# Patient Record
Sex: MALE | Race: WHITE | NOT HISPANIC OR LATINO | Employment: FULL TIME | URBAN - METROPOLITAN AREA
[De-identification: names, ages, dates, MRNs, and addresses within clinical notes are randomized per-mention and may not be internally consistent; named-entity substitution may affect disease eponyms.]

---

## 2017-07-06 DIAGNOSIS — M75.41 IMPINGEMENT SYNDROME OF RIGHT SHOULDER: Primary | ICD-10-CM

## 2017-07-20 ENCOUNTER — HOSPITAL ENCOUNTER (OUTPATIENT)
Dept: RADIOLOGY | Facility: HOSPITAL | Age: 37
Discharge: HOME | End: 2017-07-20
Attending: ORTHOPAEDIC SURGERY
Payer: COMMERCIAL

## 2017-07-20 DIAGNOSIS — M75.41 IMPINGEMENT SYNDROME OF RIGHT SHOULDER: ICD-10-CM

## 2017-07-20 PROCEDURE — 73221 MRI JOINT UPR EXTREM W/O DYE: CPT | Mod: 26,RT,, | Performed by: RADIOLOGY

## 2017-07-20 PROCEDURE — 73221 MRI JOINT UPR EXTREM W/O DYE: CPT | Mod: TC,RT

## 2017-08-31 ENCOUNTER — OFFICE VISIT (OUTPATIENT)
Dept: OPTOMETRY | Facility: CLINIC | Age: 37
End: 2017-08-31
Payer: COMMERCIAL

## 2017-08-31 DIAGNOSIS — Z46.0 ENCOUNTER FOR FITTING OR ADJUSTMENT OF SPECTACLES OR CONTACT LENSES: Primary | ICD-10-CM

## 2017-08-31 DIAGNOSIS — Z01.00 EXAMINATION OF EYES AND VISION: Primary | ICD-10-CM

## 2017-08-31 DIAGNOSIS — H52.13 MYOPIA, BILATERAL: ICD-10-CM

## 2017-08-31 PROCEDURE — 99999 PR PBB SHADOW E&M-EST. PATIENT-LVL I: CPT | Mod: PBBFAC,,, | Performed by: OPTOMETRIST

## 2017-08-31 PROCEDURE — 92015 DETERMINE REFRACTIVE STATE: CPT | Mod: S$GLB,,, | Performed by: OPTOMETRIST

## 2017-08-31 PROCEDURE — 92004 COMPRE OPH EXAM NEW PT 1/>: CPT | Mod: S$GLB,,, | Performed by: OPTOMETRIST

## 2017-08-31 PROCEDURE — 99499 UNLISTED E&M SERVICE: CPT | Mod: S$GLB,,, | Performed by: OPTOMETRIST

## 2017-08-31 PROCEDURE — 92310 CONTACT LENS FITTING OU: CPT | Mod: ,,, | Performed by: OPTOMETRIST

## 2017-08-31 NOTE — PROGRESS NOTES
Subjective:       Patient ID: Ed Osei is a 36 y.o. male      Chief Complaint   Patient presents with    Concerns About Ocular Health    Contact Lens Follow Up     History of Present Illness  Dls: 5/20/13 Dr. Talbot 2 yrs [facility]     Pt states no changes in vision. Pt wears scls and single vision glasses.   Pt wants a new rx for cls only. Pt states no tearing no itching no burning   no pain no ha's no floaters. Pt does not want to be dilated today.     Eye meds:   None    Acuvue Oasys. Replaces monthly. Doesn't sleep in lenses.      Assessment/Plan:     1. Examination of eyes and vision  Pt declined dilation, states he can't drive after he is dilated. Discussed with pt that dilation affects near vision but pt still declined dilation today. Educated pt about importance of dilation, especially since he has never had one with our clinic. Advised pt that at his next annual eye exam we will need to dilate to check ocular health and if needed, pt should bring someone with him to provide transportation. Pt verbalized understanding.     2. Myopia, bilateral  Educated patient on refractive error and discussed lens options. Dispensed updated spectacle Rx. Educated about adaptation period to new specs.    Eyeglass Final Rx     Eyeglass Final Rx       Sphere Cylinder    Right -5.50 Sphere    Left -5.50 Sphere    Type:  SVL    Expiration Date:  9/1/2018              Pt wearing oasys and replacing monthly. Discussed with pt that oasys is a 2 week replacement lens. Will trial Ingris, pt reports good comfort and vision in office. Pt to try lenses for 1 week, if happy can proceed with ordering. If pt unhappy with lenses, can switch back to oasys if needed.     Contact lens Rx released to pt. Daily wear only advised, replacement monthly with education to risks of extended wear.  Discussed lens care, compliance and solutions. RTC yearly contact lens follow up.     Contact Lens Final Rx     Final Contact Lens  Rx       Brand Base Curve Diameter Sphere Cylinder    Right Acuvue Ingris 8.4 14.0 -5.25 Sphere    Left Acuvue Ingris 8.4 14.0 -5.25 Sphere    Expiration Date:  9/1/2018    Replacement:  Monthly    Wearing Schedule:  Daily wear                Return in about 1 year (around 8/31/2018) for Annual eye exam, Contact Lens Follow Up.

## 2017-10-30 ENCOUNTER — HOSPITAL ENCOUNTER (OUTPATIENT)
Dept: PREADMISSION TESTING | Facility: HOSPITAL | Age: 37
Discharge: HOME | End: 2017-10-30
Attending: ORTHOPAEDIC SURGERY
Payer: COMMERCIAL

## 2017-10-30 VITALS
WEIGHT: 158.44 LBS | HEIGHT: 66 IN | RESPIRATION RATE: 16 BRPM | SYSTOLIC BLOOD PRESSURE: 115 MMHG | OXYGEN SATURATION: 99 % | BODY MASS INDEX: 25.46 KG/M2 | DIASTOLIC BLOOD PRESSURE: 78 MMHG | HEART RATE: 70 BPM | TEMPERATURE: 97 F

## 2017-10-30 DIAGNOSIS — Z01.818 PREOP TESTING: Primary | ICD-10-CM

## 2017-10-30 LAB
BASOPHILS # BLD AUTO: 0.02 K/UL
BASOPHILS NFR BLD: 0.3 %
DIFFERENTIAL METHOD: ABNORMAL
EOSINOPHIL # BLD AUTO: 0.3 K/UL
EOSINOPHIL NFR BLD: 3.3 %
ERYTHROCYTE [DISTWIDTH] IN BLOOD BY AUTOMATED COUNT: 12.8 %
HCT VFR BLD AUTO: 43.4 %
HGB BLD-MCNC: 15.3 G/DL
LYMPHOCYTES # BLD AUTO: 2.5 K/UL
LYMPHOCYTES NFR BLD: 32.3 %
MCH RBC QN AUTO: 31.5 PG
MCHC RBC AUTO-ENTMCNC: 35.3 G/DL
MCV RBC AUTO: 89 FL
MONOCYTES # BLD AUTO: 0.7 K/UL
MONOCYTES NFR BLD: 9 %
NEUTROPHILS # BLD AUTO: 4.3 K/UL
NEUTROPHILS NFR BLD: 55.1 %
PLATELET # BLD AUTO: 201 K/UL
PMV BLD AUTO: 10.1 FL
RBC # BLD AUTO: 4.86 M/UL
WBC # BLD AUTO: 7.77 K/UL

## 2017-10-30 PROCEDURE — 85025 COMPLETE CBC W/AUTO DIFF WBC: CPT

## 2017-10-30 NOTE — DISCHARGE INSTRUCTIONS
Your surgery is scheduled for___MONDAY 11/13______________.    Call [phone] between 2 pm and 5 pm ___FRIDAY11/10_________ to find out your arrival time for the day of surgery.    Report to SAME DAY SURGERY UNIT at _______am on the 2nd floor of the hospital.  Use the front entrance of the hospital before 530AM      Important instructions:   Do not eat or drink after 12 midnight, including water.  It is okay to brush your teeth.  Do not have gum, candy or mints.                    Prep instructions:     SHOWER   ___________     Please shower the night before and the morning of your surgery.       Use Hibiclens soap to your surgery site if instructed by your pre op nurse.     Be sure to rinse off Hibiclens after it is on your skin for several minutes.  Do not use Hibiclens on your face or genitals.           You may wear deodorant only.      Do not wear powder, body lotion or cologne.     Do not wear any jewelry or have any metal on your body.          If you are going home on the same day of surgery, you must have arrangements for a ride home.  You will not be able to drive home if you were given anesthesia or sedation.               Stop taking Aspirin, Ibuprofen, Motrin and Aleve at least 7 days before your surgery. You may use Tylenol.     Stop taking fish oil and vitamin E for least 7 days before surgery.     Wear loose fitting clothes allowing for bandages.     Please leave money and valuables home.       You may bring your cell phone.     Call the doctor if fever or illness should occur before your surgery.    Call [phone] to contact us here at [facility] if needed.

## 2017-10-30 NOTE — PLAN OF CARE
Pre-operative instructions, medication directives and pain scales reviewed with Mr Osei   All questions the patient had  were answered. Re-assurance about surgical procedure and day of surgery routine given as needed. Mr Osei verbalized understanding of the pre-op instructions.

## 2017-11-12 ENCOUNTER — ANESTHESIA EVENT (OUTPATIENT)
Dept: SURGERY | Facility: HOSPITAL | Age: 37
End: 2017-11-12
Payer: COMMERCIAL

## 2017-11-13 ENCOUNTER — HOSPITAL ENCOUNTER (OUTPATIENT)
Facility: HOSPITAL | Age: 37
Discharge: HOME | End: 2017-11-13
Attending: ORTHOPAEDIC SURGERY | Admitting: ORTHOPAEDIC SURGERY
Payer: COMMERCIAL

## 2017-11-13 ENCOUNTER — ANESTHESIA (OUTPATIENT)
Dept: SURGERY | Facility: HOSPITAL | Age: 37
End: 2017-11-13
Payer: COMMERCIAL

## 2017-11-13 ENCOUNTER — SURGERY (OUTPATIENT)
Age: 37
End: 2017-11-13

## 2017-11-13 VITALS
HEIGHT: 66 IN | BODY MASS INDEX: 25.39 KG/M2 | HEART RATE: 69 BPM | TEMPERATURE: 98 F | SYSTOLIC BLOOD PRESSURE: 118 MMHG | OXYGEN SATURATION: 100 % | DIASTOLIC BLOOD PRESSURE: 70 MMHG | WEIGHT: 158 LBS | RESPIRATION RATE: 17 BRPM

## 2017-11-13 DIAGNOSIS — M24.111 LABRAL TEAR OF SHOULDER, DEGENERATIVE, RIGHT: Primary | ICD-10-CM

## 2017-11-13 PROCEDURE — 25000003 PHARM REV CODE 250: Performed by: ORTHOPAEDIC SURGERY

## 2017-11-13 PROCEDURE — D9220A PRA ANESTHESIA: Mod: CRNA,,, | Performed by: NURSE ANESTHETIST, CERTIFIED REGISTERED

## 2017-11-13 PROCEDURE — 25000003 PHARM REV CODE 250: Performed by: ANESTHESIOLOGY

## 2017-11-13 PROCEDURE — 76942 ECHO GUIDE FOR BIOPSY: CPT | Mod: 26,,, | Performed by: ANESTHESIOLOGY

## 2017-11-13 PROCEDURE — 36000710: Performed by: ORTHOPAEDIC SURGERY

## 2017-11-13 PROCEDURE — 63600175 PHARM REV CODE 636 W HCPCS: Performed by: ORTHOPAEDIC SURGERY

## 2017-11-13 PROCEDURE — 63600175 PHARM REV CODE 636 W HCPCS: Performed by: NURSE ANESTHETIST, CERTIFIED REGISTERED

## 2017-11-13 PROCEDURE — 63600175 PHARM REV CODE 636 W HCPCS: Performed by: ANESTHESIOLOGY

## 2017-11-13 PROCEDURE — C1713 ANCHOR/SCREW BN/BN,TIS/BN: HCPCS | Performed by: ORTHOPAEDIC SURGERY

## 2017-11-13 PROCEDURE — 71000033 HC RECOVERY, INTIAL HOUR: Performed by: ORTHOPAEDIC SURGERY

## 2017-11-13 PROCEDURE — 37000009 HC ANESTHESIA EA ADD 15 MINS: Performed by: ORTHOPAEDIC SURGERY

## 2017-11-13 PROCEDURE — 25000003 PHARM REV CODE 250: Performed by: NURSE ANESTHETIST, CERTIFIED REGISTERED

## 2017-11-13 PROCEDURE — 76942 ECHO GUIDE FOR BIOPSY: CPT | Performed by: ANESTHESIOLOGY

## 2017-11-13 PROCEDURE — D9220A PRA ANESTHESIA: Mod: ANES,,, | Performed by: ANESTHESIOLOGY

## 2017-11-13 PROCEDURE — 36000711: Performed by: ORTHOPAEDIC SURGERY

## 2017-11-13 PROCEDURE — 64415 NJX AA&/STRD BRCH PLXS IMG: CPT | Mod: 59,RT,, | Performed by: ANESTHESIOLOGY

## 2017-11-13 PROCEDURE — 71000015 HC POSTOP RECOV 1ST HR: Performed by: ORTHOPAEDIC SURGERY

## 2017-11-13 PROCEDURE — 27201423 OPTIME MED/SURG SUP & DEVICES STERILE SUPPLY: Performed by: ORTHOPAEDIC SURGERY

## 2017-11-13 PROCEDURE — 37000008 HC ANESTHESIA 1ST 15 MINUTES: Performed by: ORTHOPAEDIC SURGERY

## 2017-11-13 PROCEDURE — 27200750 HC INSULATED NEEDLE/ STIMUPLEX: Performed by: ANESTHESIOLOGY

## 2017-11-13 RX ORDER — MIDAZOLAM HYDROCHLORIDE 1 MG/ML
INJECTION, SOLUTION INTRAMUSCULAR; INTRAVENOUS
Status: DISCONTINUED | OUTPATIENT
Start: 2017-11-13 | End: 2017-11-13

## 2017-11-13 RX ORDER — OXYCODONE AND ACETAMINOPHEN 5; 325 MG/1; MG/1
1 TABLET ORAL
Status: DISCONTINUED | OUTPATIENT
Start: 2017-11-13 | End: 2017-11-13 | Stop reason: HOSPADM

## 2017-11-13 RX ORDER — SODIUM CHLORIDE 0.9 % (FLUSH) 0.9 %
3 SYRINGE (ML) INJECTION
Status: DISCONTINUED | OUTPATIENT
Start: 2017-11-13 | End: 2017-11-13 | Stop reason: HOSPADM

## 2017-11-13 RX ORDER — HYDROCODONE BITARTRATE AND ACETAMINOPHEN 7.5; 325 MG/1; MG/1
1 TABLET ORAL EVERY 4 HOURS PRN
Qty: 50 TABLET | Refills: 0 | Status: SHIPPED | OUTPATIENT
Start: 2017-11-13 | End: 2017-11-23

## 2017-11-13 RX ORDER — CEFAZOLIN SODIUM 2 G/50ML
2 SOLUTION INTRAVENOUS
Status: DISCONTINUED | OUTPATIENT
Start: 2017-11-13 | End: 2017-11-13 | Stop reason: HOSPADM

## 2017-11-13 RX ORDER — METOCLOPRAMIDE HYDROCHLORIDE 5 MG/ML
10 INJECTION INTRAMUSCULAR; INTRAVENOUS EVERY 10 MIN PRN
Status: DISCONTINUED | OUTPATIENT
Start: 2017-11-13 | End: 2017-11-13 | Stop reason: HOSPADM

## 2017-11-13 RX ORDER — SODIUM CHLORIDE, SODIUM LACTATE, POTASSIUM CHLORIDE, CALCIUM CHLORIDE 600; 310; 30; 20 MG/100ML; MG/100ML; MG/100ML; MG/100ML
INJECTION, SOLUTION INTRAVENOUS CONTINUOUS
Status: DISCONTINUED | OUTPATIENT
Start: 2017-11-13 | End: 2017-11-13 | Stop reason: HOSPADM

## 2017-11-13 RX ORDER — LIDOCAINE HCL/EPINEPHRINE/PF 2%-1:200K
VIAL (ML) INJECTION
Status: DISCONTINUED | OUTPATIENT
Start: 2017-11-13 | End: 2017-11-13 | Stop reason: HOSPADM

## 2017-11-13 RX ORDER — LIDOCAINE HCL/PF 100 MG/5ML
SYRINGE (ML) INTRAVENOUS
Status: DISCONTINUED | OUTPATIENT
Start: 2017-11-13 | End: 2017-11-13

## 2017-11-13 RX ORDER — PROPOFOL 10 MG/ML
VIAL (ML) INTRAVENOUS CONTINUOUS PRN
Status: DISCONTINUED | OUTPATIENT
Start: 2017-11-13 | End: 2017-11-13

## 2017-11-13 RX ORDER — MEPERIDINE HYDROCHLORIDE 50 MG/ML
12.5 INJECTION INTRAMUSCULAR; INTRAVENOUS; SUBCUTANEOUS ONCE AS NEEDED
Status: COMPLETED | OUTPATIENT
Start: 2017-11-13 | End: 2017-11-13

## 2017-11-13 RX ORDER — LIDOCAINE HYDROCHLORIDE 10 MG/ML
1 INJECTION, SOLUTION EPIDURAL; INFILTRATION; INTRACAUDAL; PERINEURAL ONCE
Status: DISCONTINUED | OUTPATIENT
Start: 2017-11-13 | End: 2017-11-13 | Stop reason: HOSPADM

## 2017-11-13 RX ORDER — GLYCOPYRROLATE 0.2 MG/ML
INJECTION INTRAMUSCULAR; INTRAVENOUS
Status: DISCONTINUED | OUTPATIENT
Start: 2017-11-13 | End: 2017-11-13

## 2017-11-13 RX ORDER — FENTANYL CITRATE 50 UG/ML
INJECTION, SOLUTION INTRAMUSCULAR; INTRAVENOUS
Status: DISCONTINUED | OUTPATIENT
Start: 2017-11-13 | End: 2017-11-13

## 2017-11-13 RX ORDER — ONDANSETRON 2 MG/ML
INJECTION INTRAMUSCULAR; INTRAVENOUS
Status: DISCONTINUED | OUTPATIENT
Start: 2017-11-13 | End: 2017-11-13

## 2017-11-13 RX ORDER — DEXAMETHASONE SODIUM PHOSPHATE 4 MG/ML
INJECTION, SOLUTION INTRA-ARTICULAR; INTRALESIONAL; INTRAMUSCULAR; INTRAVENOUS; SOFT TISSUE
Status: DISCONTINUED | OUTPATIENT
Start: 2017-11-13 | End: 2017-11-13

## 2017-11-13 RX ORDER — HYDROMORPHONE HYDROCHLORIDE 2 MG/ML
0.2 INJECTION, SOLUTION INTRAMUSCULAR; INTRAVENOUS; SUBCUTANEOUS EVERY 5 MIN PRN
Status: DISCONTINUED | OUTPATIENT
Start: 2017-11-13 | End: 2017-11-13 | Stop reason: HOSPADM

## 2017-11-13 RX ORDER — EPINEPHRINE 1 MG/ML
INJECTION, SOLUTION INTRACARDIAC; INTRAMUSCULAR; INTRAVENOUS; SUBCUTANEOUS
Status: DISCONTINUED | OUTPATIENT
Start: 2017-11-13 | End: 2017-11-13 | Stop reason: HOSPADM

## 2017-11-13 RX ORDER — PROPOFOL 10 MG/ML
VIAL (ML) INTRAVENOUS
Status: DISCONTINUED | OUTPATIENT
Start: 2017-11-13 | End: 2017-11-13

## 2017-11-13 RX ADMIN — ONDANSETRON 4 MG: 2 INJECTION, SOLUTION INTRAMUSCULAR; INTRAVENOUS at 02:11

## 2017-11-13 RX ADMIN — MEPERIDINE HYDROCHLORIDE 12.5 MG: 50 INJECTION INTRAMUSCULAR; INTRAVENOUS; SUBCUTANEOUS at 03:11

## 2017-11-13 RX ADMIN — FENTANYL CITRATE 50 MCG: 50 INJECTION INTRAMUSCULAR; INTRAVENOUS at 12:11

## 2017-11-13 RX ADMIN — LIDOCAINE HYDROCHLORIDE 75 MG: 20 INJECTION, SOLUTION INTRAVENOUS at 12:11

## 2017-11-13 RX ADMIN — EPINEPHRINE 1 ML: 1 INJECTION, SOLUTION INTRAMUSCULAR; SUBCUTANEOUS at 01:11

## 2017-11-13 RX ADMIN — MIDAZOLAM HYDROCHLORIDE 4 MG: 1 INJECTION, SOLUTION INTRAMUSCULAR; INTRAVENOUS at 11:11

## 2017-11-13 RX ADMIN — FENTANYL CITRATE 50 MCG: 50 INJECTION INTRAMUSCULAR; INTRAVENOUS at 11:11

## 2017-11-13 RX ADMIN — DEXAMETHASONE SODIUM PHOSPHATE 4 MG: 4 INJECTION, SOLUTION INTRAMUSCULAR; INTRAVENOUS at 02:11

## 2017-11-13 RX ADMIN — PROPOFOL 75 MCG/KG/MIN: 10 INJECTION, EMULSION INTRAVENOUS at 12:11

## 2017-11-13 RX ADMIN — LIDOCAINE HYDROCHLORIDE,EPINEPHRINE BITARTRATE 20 ML: 20; .005 INJECTION, SOLUTION EPIDURAL; INFILTRATION; INTRACAUDAL; PERINEURAL at 01:11

## 2017-11-13 RX ADMIN — GLYCOPYRROLATE 0.2 MG: 0.2 INJECTION, SOLUTION INTRAMUSCULAR; INTRAVENOUS at 12:11

## 2017-11-13 RX ADMIN — SODIUM CHLORIDE, SODIUM LACTATE, POTASSIUM CHLORIDE, AND CALCIUM CHLORIDE: .6; .31; .03; .02 INJECTION, SOLUTION INTRAVENOUS at 11:11

## 2017-11-13 RX ADMIN — SODIUM CHLORIDE, SODIUM LACTATE, POTASSIUM CHLORIDE, AND CALCIUM CHLORIDE: .6; .31; .03; .02 INJECTION, SOLUTION INTRAVENOUS at 10:11

## 2017-11-13 RX ADMIN — PROPOFOL 40 MG: 10 INJECTION, EMULSION INTRAVENOUS at 12:11

## 2017-11-13 RX ADMIN — CEFAZOLIN SODIUM 2 G: 2 SOLUTION INTRAVENOUS at 12:11

## 2017-11-13 NOTE — PLAN OF CARE
Problem: Patient Care Overview  Goal: Plan of Care Review  Outcome: Ongoing (interventions implemented as appropriate)   11/13/17 1551   Coping/Psychosocial   Plan Of Care Reviewed With patient     Recovery care complete. No acute events during recovery phase. AA/O. Mesha score 10/10. No N/V. Afebrile. Bear hugger applied for post-op hypothermia with full relief obtained; final temp= 96.8 axillary. Adequate spo2s maintained via RA; no SOB noted. All other VSS. NSR per monitor. Acceptable level of pain maintained (4/10); pt denies any c/o incisional pain; block noted. IVF infusing. DSG to R shoulder c/d/i with no redness nor swelling noted. Polar ice machine noted to R shoulder. Report given to AUGUSTINA Blair RN in [facility]. Pt transported to [facility] via stretcher. Family notified.      Problem: Surgery Nonspecified (Adult)  Goal: Signs and Symptoms of Listed Potential Problems Will be Absent, Minimized or Managed (Surgery Nonspecified)  Signs and symptoms of listed potential problems will be absent, minimized or managed by discharge/transition of care (reference Surgery Nonspecified (Adult) CPG).   Outcome: Ongoing (interventions implemented as appropriate)   11/13/17 1551   Surgery Nonspecified   Problems Assessed (Surgery) all   Problems Present (Surgery) none     Goal: Anesthesia/Sedation Recovery  Outcome: Outcome(s) achieved Date Met: 11/13/17 11/13/17 1551   Goal/Outcome Evaluation   Anesthesia/Sedation Recovery criteria met for transfer

## 2017-11-13 NOTE — ANESTHESIA PROCEDURE NOTES
Right supraclavicular block    Patient location during procedure: pre-op   Block not for primary anesthetic.  Reason for block: at surgeon's request and post-op pain management   Post-op Pain Location: Right shoulder pain  Start time: 11/13/2017 11:57 AM  Timeout: 11/13/2017 11:55 AM   End time: 11/13/2017 11:59 AM  Surgery related to: Right arthroscopy, labral repair  Staffing  Anesthesiologist: NILAM KEATING  Performed: anesthesiologist   Preanesthetic Checklist  Completed: patient identified, site marked, surgical consent, pre-op evaluation, timeout performed, IV checked, risks and benefits discussed and monitors and equipment checked  Peripheral Block  Patient position: sitting  Prep: ChloraPrep  Patient monitoring: heart rate, cardiac monitor, continuous pulse ox and frequent blood pressure checks  Block type: supraclavicular  Laterality: right  Injection technique: single shot  Needle  Needle type: Stimuplex   Needle gauge: 22 G  Needle length: 2 in  Needle localization: anatomical landmarks and ultrasound guidance   -ultrasound image captured on disc.  Assessment  Injection assessment: negative aspiration, negative parasthesia and local visualized surrounding nerve  Heart rate change: no  Slow fractionated injection: yes  Medications:  Bolus administered: 30 mL of 0.5 (mepi 1.5%/ropi 0.5%) bupivacaine  Additional Notes  Bupivacaine 0.5% + PF Decadron 4mg/30ml, 30ml

## 2017-11-13 NOTE — DISCHARGE INSTRUCTIONS
***  ACTIVITY LEVEL: If you have received sedation or an anesthetic, you may feel sleepy for several hours. Rest until you are more awake. Gradually resume your normal activities.     Lifting restrictions    Minimal use of right arm. No lifting above shoulder level                 DIET: You may resume your home diet. If nausea is present, increase your diet gradually with fluids and bland foods.    Medications: Pain medication should be taken only if needed and as directed. If antibiotics are prescribed, the medication should be taken until completed. You will be given an updated list of you medications.    ? No driving, alcoholic beverages or signing legal documents for next 24 hours or while taking pain medication.      CALL THE DOCTOR:    For any obvious bleeding (some dried blood over the incision is normal).      Redness, swelling, foul smell around incision or fever over 101.   Shortness of breath, Coughing up Bloody Sputum or Pains or Swelling in your Calves.   Persistent pain or nausea not relieved by medication.   Impaired circulation such as tingling, change in color, numbness or tingling, coldness.    If any unusual problems or difficulties occur contact your doctor. If you cannot contact your doctor but feel your signs and symptoms warrant a physicians attention return to the emergency room.      Remove dressing in 48 hours. Shower with soap and water and rinse then pat dry. Keep clean and dry. Do not submerge under water, no hot tubs, no baths, no swimming  Until released by your doctor,.

## 2017-11-13 NOTE — ANESTHESIA PREPROCEDURE EVALUATION
11/13/2017  Ed Osei is a 37 y.o., male.    Anesthesia Evaluation    I have reviewed the Patient Summary Reports.     I have reviewed the Medications.     Review of Systems  Anesthesia Hx:  Hx of Anesthetic complications +PONV   Social:  Non-Smoker, Alcohol Use    Cardiovascular:   Exercise tolerance: good        Physical Exam  General:  Well nourished    Airway/Jaw/Neck:  Airway Findings: Mouth Opening: Normal Tongue: Normal  General Airway Assessment: Adult  Mallampati: II  TM Distance: Normal, at least 6 cm  Jaw/Neck Findings:  Neck ROM: Normal ROM      Dental:  Dental Findings: In tact   Chest/Lungs:  Chest/Lungs Findings: Clear to auscultation, Normal Respiratory Rate     Heart/Vascular:  Heart Findings: Rate: Normal        Mental Status:  Mental Status Findings:  Cooperative, Alert and Oriented         Anesthesia Plan  Type of Anesthesia, risks & benefits discussed:  Anesthesia Type:  general  Patient's Preference:   Intra-op Monitoring Plan: standard ASA monitors  Intra-op Monitoring Plan Comments:   Post Op Pain Control Plan: multimodal analgesia, IV/PO Opioids PRN and per primary service following discharge from PACU  Post Op Pain Control Plan Comments:   Induction:   IV  Beta Blocker:  Patient is not currently on a Beta-Blocker (No further documentation required).       Informed Consent: Patient understands risks and agrees with Anesthesia plan.  Questions answered. Anesthesia consent signed with patient.  ASA Score: 2     Day of Surgery Review of History & Physical:    H&P update referred to the surgeon.         Ready For Surgery From Anesthesia Perspective.

## 2017-11-13 NOTE — TRANSFER OF CARE
"Anesthesia Transfer of Care Note    Patient: Ed Osei    Procedure(s) Performed: Procedure(s) (LRB):  ARTHROSCOPY-SHOULDER (Right)  REPAIR-LABRAL (Right)    Patient location: PACU    Anesthesia Type: general and regional    Transport from OR: Transported from OR on room air with adequate spontaneous ventilation    Post pain: adequate analgesia    Post assessment: no apparent anesthetic complications and tolerated procedure well    Post vital signs: stable    Level of consciousness: awake, alert and oriented    Nausea/Vomiting: no nausea/vomiting    Complications: none    Transfer of care protocol was followed      Last vitals:   Visit Vitals  /70 (BP Location: Left arm, Patient Position: Lying)   Pulse 88   Temp (!) 35 °C (95 °F) (Oral)   Resp 10   Ht 5' 6" (1.676 m)   Wt 71.7 kg (158 lb)   SpO2 95%   BMI 25.50 kg/m²     "

## 2017-11-14 NOTE — ANESTHESIA POSTPROCEDURE EVALUATION
"Anesthesia Post Evaluation    Patient: Ed Osei    Procedure(s) Performed: Procedure(s) (LRB):  ARTHROSCOPY-SHOULDER (Right)  REPAIR-LABRAL (Right)    Final Anesthesia Type: general  Patient location during evaluation: PACU  Patient participation: Yes- Able to Participate  Level of consciousness: awake and alert and oriented  Post-procedure vital signs: reviewed and stable  Pain management: adequate  Airway patency: patent  PONV status at discharge: No PONV  Anesthetic complications: no      Cardiovascular status: blood pressure returned to baseline and hemodynamically stable  Respiratory status: unassisted, spontaneous ventilation and room air  Hydration status: euvolemic  Follow-up not needed.        Visit Vitals  /70   Pulse 69   Temp 36.9 °C (98.4 °F) (Oral)   Resp 17   Ht 5' 6" (1.676 m)   Wt 71.7 kg (158 lb)   SpO2 100%   BMI 25.50 kg/m²       Pain/Mesha Score: Pain Assessment Performed: Yes (11/13/2017  4:55 PM)  Presence of Pain: denies (11/13/2017  4:55 PM)  Pain Rating Prior to Med Admin: 0 (11/13/2017  3:30 PM)  Mesha Score: 9 (11/13/2017  4:55 PM)  Modified Mesha Score: 19 (11/13/2017  4:55 PM)      "

## 2017-11-14 NOTE — BRIEF OP NOTE
[facility]  Brief Operative Note     SUMMARY     Surgery Date: 11/13/2017     Surgeon(s) and Role:     * Rafy Whitfield MD - Primary    Assisting Surgeon: Dot    Pre-op Diagnosis:  Labral tear of shoulder, degenerative, right [M24.111]    Post-op Diagnosis:  Post-Op Diagnosis Codes:     * Labral tear of shoulder, degenerative, right [M24.111]    Procedure(s) (LRB):  ARTHROSCOPY-SHOULDER (Right)  REPAIR-LABRAL (Right)  SLAP repair, anterior labral repair, posterior labral repair    Anesthesia: General    Description of the findings of the procedure: circumferential labral tear, cartilage injury on the humerus and glenoid    Findings/Key Components: same    Estimated Blood Loss: 5ml         Specimens:   Specimen (12h ago through future)    None      Linvatec anchor x 8    Discharge Note    SUMMARY     Admit Date: 11/13/2017    Discharge Date and Time: 11/13/17    Hospital Course (synopsis of major diagnoses, care, treatment, and services provided during the course of the hospital stay): Aamir benedict    Final Diagnosis: Post-Op Diagnosis Codes:     * Labral tear of shoulder, degenerative, right [M24.111]    Disposition: Home or Self Care    Follow Up/Patient Instructions:     Medications:  Reconciled Home Medications:   Discharge Medication List as of 11/13/2017  4:31 PM      START taking these medications    Details   hydrocodone-acetaminophen 7.5-325mg (NORCO) 7.5-325 mg per tablet Take 1 tablet by mouth every 4 (four) hours as needed for Pain., Starting Mon 11/13/2017, Until Thu 11/23/2017, Print             Discharge Procedure Orders  Diet general     Shower on day dressing removed (No bath)     Ice to affected area     Lifting restrictions   Order Comments: Minimal use of right arm. No lifting above shoulder level     Call MD for:  temperature >100.4     Call MD for:  persistent nausea and vomiting or diarrhea     Call MD for:  severe uncontrolled pain     Call MD for:  redness,  tenderness, or signs of infection (pain, swelling, redness, odor or green/yellow discharge around incision site)     Remove dressing in 48 hours       Follow-up Information     Rafy Whitfield MD In 2 weeks.    Specialty:  Orthopedic Surgery  Contact information:  [address]  [address]  [place] [address]  [phone]

## 2017-11-14 NOTE — OP NOTE
DATE OF PROCEDURE:  11/13/2017    PREOPERATIVE DIAGNOSIS:  Right shoulder posterior superior labral tear.    POSTOPERATIVE DIAGNOSIS:  Circumferential labral tear with ostechondral defect   of the humerus and paralabral cyst of the glenoid.    PROCEDURE:  Anterior labral repair, posterior labral repair and SLAP repair,   right shoulder.    BLOOD LOSS:  Minimal.    IMPLANTS:  Linvatec anchors x8.    COMPLICATIONS:  None.    PROCEDURE IN DETAIL:  After proper consents were obtained, the patient was taken   to the Operating Room and administered general anesthesia, he was placed in the   left lateral decubitus position and the right upper extremity was prepped and   draped in normal sterile fashion.  Bony landmarks were marked, and 20 mL of   lidocaine with epinephrine were infiltrated about the expected portals.    Incision was made and diagnostic arthroscopy was performed through the posterior   portal.  There was some mild erythema about the biceps labrum was noted to be   frayed and partially detached circumferentially with some redundancy of the   labral tissue osteochondral damage was noted on the humerus near the anterior   part of the articulation, small cartilage defect was noted anteriorly on the   glenoid as well.  Anterior portal was established and the friable labral tissue   was debrided.  It was noted to be unstable circumferentially loose flaps of the   osteochondral damage on the humerus were removed.  Probe was introduced and it   was sized at about 1 x 1 cm surrounding cartilage was stable with no other   defects noted.  Posterior labral repair was then performed first accessory   posterior portal was established and the scope was transferred to the anterior   superior portal.  Glenoid was prepared with the elevator and then with a rasp.    Soft tissues were removed with the shaver and repair was then performed.  Drill   guide was inserted.  Drill bit was passed and anchor was deployed.  Suture was    shuttled with the Spectrum suture passer and arthroscopic knot was tied.  This   was repeated for 3 more anchors extending from about the 6:30 position to 8:30,   9:30 and 10:30 position.  The labral tissue was noted to be reapproximated very   well to its insertion on the glenoid.  The scope was then transferred back to   the posterior portal and anterior repair was performed.  Accessory portal was   established and anterior face of the glenoid was prepared with the shaver   elevator rasp and then again with the shaver.  Another portal was established   drill guide was inserted at about the 5:30 position.  Drill bit was passed and   anchor was deployed.  Sutures were shuttled with the Spectrum suture passer and   carried out through a separate portal.  Arthroscopic knot was then tied.  This   was repeated for 2 more anchors anteriorly at about the 3:30 and 1:30 position.    8th anchor was placed at about straight at 12 o'clock through the anterior   superior portal, guide was inserted and drill bit was passed.  Sutures were   shuttled and an arthroscopic knot was tied.  The probe was introduced and all   labral tissue was noted to be very stable.  All arthroscopic instrumentation was   then removed from the joint.  Portals were closed with 4-0 nylon.  Sterile   dressings were applied.  The patient was aroused in Operating Room, transferred   to Recovery in stable condition.      NEHA/IN  dd: 11/13/2017 15:06:55 (CST)  td: 11/13/2017 18:47:24 (CST)  Doc ID   #[id number]  Job ID #[id number]    CC:

## 2018-01-10 ENCOUNTER — TELEPHONE (OUTPATIENT)
Dept: OPTOMETRY | Facility: CLINIC | Age: 38
End: 2018-01-10

## 2018-01-10 NOTE — TELEPHONE ENCOUNTER
Pt requested to switch back to acuvue oasys from acuvue quan. Rx updated in chart. Advised pt that oasys are 2 week replacement.    Contact Lens Final Rx     Final Contact Lens Rx       Brand Base Curve Diameter Sphere Cylinder    Right Acuvue Oasys 8.4 14.0 -5.25 Sphere    Left Acuvue Oasys 8.4 14.0 -5.25 Sphere    Expiration Date:  09/01/2018    Replacement:  Every 2 weeks    Wearing Schedule:  Daily wear

## 2019-03-23 ENCOUNTER — OFFICE VISIT (OUTPATIENT)
Dept: URGENT CARE | Facility: CLINIC | Age: 39
End: 2019-03-23
Payer: COMMERCIAL

## 2019-03-23 VITALS
OXYGEN SATURATION: 97 % | TEMPERATURE: 100 F | BODY MASS INDEX: 25.39 KG/M2 | HEIGHT: 66 IN | DIASTOLIC BLOOD PRESSURE: 77 MMHG | RESPIRATION RATE: 18 BRPM | WEIGHT: 158 LBS | SYSTOLIC BLOOD PRESSURE: 125 MMHG | HEART RATE: 75 BPM

## 2019-03-23 DIAGNOSIS — J06.9 VIRAL URI: Primary | ICD-10-CM

## 2019-03-23 PROCEDURE — 3008F PR BODY MASS INDEX (BMI) DOCUMENTED: ICD-10-PCS | Mod: CPTII,S$GLB,, | Performed by: NURSE PRACTITIONER

## 2019-03-23 PROCEDURE — 99203 PR OFFICE/OUTPT VISIT, NEW, LEVL III, 30-44 MIN: ICD-10-PCS | Mod: S$GLB,,, | Performed by: NURSE PRACTITIONER

## 2019-03-23 PROCEDURE — 99203 OFFICE O/P NEW LOW 30 MIN: CPT | Mod: S$GLB,,, | Performed by: NURSE PRACTITIONER

## 2019-03-23 PROCEDURE — 3008F BODY MASS INDEX DOCD: CPT | Mod: CPTII,S$GLB,, | Performed by: NURSE PRACTITIONER

## 2019-03-23 NOTE — PROGRESS NOTES
"Subjective:       Patient ID: Ed Osei is a 38 y.o. male.    Vitals:  height is 5' 6" (1.676 m) and weight is 71.7 kg (158 lb). His temperature is 99.9 °F (37.7 °C). His blood pressure is 125/77 and his pulse is 75. His respiration is 18 and oxygen saturation is 97%.     Chief Complaint: Generalized Body Aches    Sore Throat    This is a new problem. The current episode started yesterday. Associated symptoms include coughing. Pertinent negatives include no congestion, ear pain, shortness of breath, stridor or vomiting. He has tried NSAIDs for the symptoms. The treatment provided no relief.       Constitution: Positive for chills and fever. Negative for sweating and fatigue.   HENT: Positive for sore throat. Negative for ear pain, congestion, sinus pain, sinus pressure and voice change.    Neck: Negative for painful lymph nodes.   Eyes: Negative for eye redness.   Respiratory: Positive for cough. Negative for chest tightness, sputum production, bloody sputum, COPD, shortness of breath, stridor, wheezing and asthma.    Gastrointestinal: Negative for nausea and vomiting.   Musculoskeletal: Positive for muscle ache.   Skin: Negative for rash.   Allergic/Immunologic: Negative for seasonal allergies and asthma.   Hematologic/Lymphatic: Negative for swollen lymph nodes.       Objective:      Physical Exam   Constitutional: He is oriented to person, place, and time. Vital signs are normal. He appears well-developed and well-nourished. He is cooperative.  Non-toxic appearance. He does not have a sickly appearance. He does not appear ill. No distress.   HENT:   Head: Normocephalic and atraumatic.   Right Ear: Hearing, tympanic membrane, external ear and ear canal normal.   Left Ear: Hearing, tympanic membrane, external ear and ear canal normal.   Nose: Mucosal edema and rhinorrhea present. Right sinus exhibits no maxillary sinus tenderness. Left sinus exhibits no maxillary sinus tenderness.   Mouth/Throat: Uvula is " midline and mucous membranes are normal. No oropharyngeal exudate, posterior oropharyngeal edema, posterior oropharyngeal erythema or tonsillar abscesses. No tonsillar exudate.   Eyes: Conjunctivae, EOM and lids are normal. Pupils are equal, round, and reactive to light.   Neck: Trachea normal, normal range of motion and full passive range of motion without pain. Neck supple. No JVD present.   Cardiovascular: Normal rate, regular rhythm, normal heart sounds and intact distal pulses. Exam reveals no decreased pulses.   Pulses:       Carotid pulses are 2+ on the right side, and 2+ on the left side.       Radial pulses are 2+ on the right side, and 2+ on the left side.   Pulmonary/Chest: Effort normal and breath sounds normal.   Musculoskeletal: Normal range of motion.   Lymphadenopathy:        Head (right side): No submental, no submandibular, no tonsillar, no preauricular and no posterior auricular adenopathy present.        Head (left side): No submental, no submandibular, no tonsillar, no preauricular and no posterior auricular adenopathy present.     He has no cervical adenopathy.   Neurological: He is alert and oriented to person, place, and time. He has normal strength. He displays a negative Romberg sign. Gait normal.   Skin: Skin is warm. Capillary refill takes less than 2 seconds. No rash noted.   Psychiatric: He has a normal mood and affect. His speech is normal and behavior is normal. Judgment and thought content normal. Cognition and memory are normal.   Nursing note and vitals reviewed.      Assessment:       1. Viral URI      Flu Negative  Strep Negative  Plan:         Viral URI  -     POCT Influenza A/B  -     POCT rapid strep A      Patient Instructions   You have been diagnosed with a viral syndrome. Viral syndromes are self-limited and usually resolve within 10 days from onset of symptoms. Antibiotics are not effective against viral infections. It is important that you get lots of rest and drink  plenty of fluids. Treatment is through symptomatic relief.    Below are suggestions for symptomatic relief:   -Tylenol every 4 hours OR ibuprofen every 6 hours as needed for pain/fever.   -Salt water gargles to soothe throat pain.   -Chloroseptic spray also helps to numb throat pain.   -Nasal saline spray reduces inflammation and dryness.   -Warm face compresses to help with facial sinus pain/pressure.   -Vicks vapor rub at night.   -Flonase OTC or Nasacort OTC for nasal congestion.   -Simple foods like chicken noodle soup.   -Delsym helps with coughing at night   -Zyrtec/Claritin during the day & Benadryl at night may help with allergies.     If you DO NOT have Hypertension or any history of palpitations, it is ok to take over the counter Sudafed or Mucinex D or Allegra-D or Claritin-D or Zyrtec-D.  If you do take one of the above, it is ok to combine that with plain over the counter Mucinex or Allegra or Claritin or Zyrtec. If, for example, you are taking Zyrtec -D, you can combine that with Mucinex, but not Mucinex-D.  If you are taking Mucinex-D, you can combine that with plain Allegra or Claritin or Zyrtec.   If you DO have Hypertension or palpitations, it is safe to take Coricidin HBP for relief of sinus symptoms.    Please return to clinic if symptoms have not subsided 10-14 days from onset, as your viral infection may have developed into a bacterial infection. It is at that time antibiotics would be indicated.       If you were prescribed a narcotic or controlled medication, do not drive or operate heavy equipment or machinery while taking these medications.  You must understand that you've received an Urgent Care treatment only and that you may be released before all your medical problems are known or treated. You, the patient, will arrange for follow up care as instructed.  Follow up with your PCP or specialty clinic as directed within 2-5 days if not improved or as needed.  You can call [phone] to  schedule an appointment with the appropriate provider.  If your condition worsens we recommend that you receive another evaluation at the emergency room immediately or contact your primary medical clinics after hours call service to discuss your concerns.  Please return here or go to the Emergency Department for any concerns or worsening of condition.    Viral Upper Respiratory Illness (Adult)  You have a viral upper respiratory illness (URI), which is another term for the common cold. This illness is contagious during the first few days. It is spread through the air by coughing and sneezing. It may also be spread by direct contact (touching the sick person and then touching your own eyes, nose, or mouth). Frequent handwashing will decrease risk of spread. Most viral illnesses go away within 7 to 10 days with rest and simple home remedies. Sometimes the illness may last for several weeks. Antibiotics will not kill a virus, and they are generally not prescribed for this condition.    Home care  · If symptoms are severe, rest at home for the first 2 to 3 days. When you resume activity, don't let yourself get too tired.  · Avoid being exposed to cigarette smoke (yours or others).  · You may use acetaminophen or ibuprofen to control pain and fever, unless another medicine was prescribed. (Note: If you have chronic liver or kidney disease, have ever had a stomach ulcer or gastrointestinal bleeding, or are taking blood-thinning medicines, talk with your healthcare provider before using these medicines.) Aspirin should never be given to anyone under 18 years of age who is ill with a viral infection or fever. It may cause severe liver or brain damage.  · Your appetite may be poor, so a light diet is fine. Avoid dehydration by drinking 6 to 8 glasses of fluids per day (water, soft drinks, juices, tea, or soup). Extra fluids will help loosen secretions in the nose and lungs.  · Over-the-counter cold medicines will not  shorten the length of time youre sick, but they may be helpful for the following symptoms: cough, sore throat, and nasal and sinus congestion. (Note: Do not use decongestants if you have high blood pressure.)  Follow-up care  Follow up with your healthcare provider, or as advised.  When to seek medical advice  Call your healthcare provider right away if any of these occur:  · Cough with lots of colored sputum (mucus)  · Severe headache; face, neck, or ear pain  · Difficulty swallowing due to throat pain  · Fever of 100.4°F (38°C)  Call 911, or get immediate medical care  Call emergency services right away if any of these occur:  · Chest pain, shortness of breath, wheezing, or difficulty breathing  · Coughing up blood  · Inability to swallow due to throat pain  Date Last Reviewed: 9/13/2015  © 9119-6845 [employer]. [address] [place] [address]. All rights reserved. This information is not intended as a substitute for professional medical care. Always follow your healthcare professional's instructions.

## 2019-03-23 NOTE — PATIENT INSTRUCTIONS
You have been diagnosed with a viral syndrome. Viral syndromes are self-limited and usually resolve within 10 days from onset of symptoms. Antibiotics are not effective against viral infections. It is important that you get lots of rest and drink plenty of fluids. Treatment is through symptomatic relief.    Below are suggestions for symptomatic relief:   -Tylenol every 4 hours OR ibuprofen every 6 hours as needed for pain/fever.   -Salt water gargles to soothe throat pain.   -Chloroseptic spray also helps to numb throat pain.   -Nasal saline spray reduces inflammation and dryness.   -Warm face compresses to help with facial sinus pain/pressure.   -Vicks vapor rub at night.   -Flonase OTC or Nasacort OTC for nasal congestion.   -Simple foods like chicken noodle soup.   -Delsym helps with coughing at night   -Zyrtec/Claritin during the day & Benadryl at night may help with allergies.     If you DO NOT have Hypertension or any history of palpitations, it is ok to take over the counter Sudafed or Mucinex D or Allegra-D or Claritin-D or Zyrtec-D.  If you do take one of the above, it is ok to combine that with plain over the counter Mucinex or Allegra or Claritin or Zyrtec. If, for example, you are taking Zyrtec -D, you can combine that with Mucinex, but not Mucinex-D.  If you are taking Mucinex-D, you can combine that with plain Allegra or Claritin or Zyrtec.   If you DO have Hypertension or palpitations, it is safe to take Coricidin HBP for relief of sinus symptoms.    Please return to clinic if symptoms have not subsided 10-14 days from onset, as your viral infection may have developed into a bacterial infection. It is at that time antibiotics would be indicated.       If you were prescribed a narcotic or controlled medication, do not drive or operate heavy equipment or machinery while taking these medications.  You must understand that you've received an Urgent Care treatment only and that you may be released before  all your medical problems are known or treated. You, the patient, will arrange for follow up care as instructed.  Follow up with your PCP or specialty clinic as directed within 2-5 days if not improved or as needed.  You can call [phone] to schedule an appointment with the appropriate provider.  If your condition worsens we recommend that you receive another evaluation at the emergency room immediately or contact your primary medical clinics after hours call service to discuss your concerns.  Please return here or go to the Emergency Department for any concerns or worsening of condition.    Viral Upper Respiratory Illness (Adult)  You have a viral upper respiratory illness (URI), which is another term for the common cold. This illness is contagious during the first few days. It is spread through the air by coughing and sneezing. It may also be spread by direct contact (touching the sick person and then touching your own eyes, nose, or mouth). Frequent handwashing will decrease risk of spread. Most viral illnesses go away within 7 to 10 days with rest and simple home remedies. Sometimes the illness may last for several weeks. Antibiotics will not kill a virus, and they are generally not prescribed for this condition.    Home care  · If symptoms are severe, rest at home for the first 2 to 3 days. When you resume activity, don't let yourself get too tired.  · Avoid being exposed to cigarette smoke (yours or others).  · You may use acetaminophen or ibuprofen to control pain and fever, unless another medicine was prescribed. (Note: If you have chronic liver or kidney disease, have ever had a stomach ulcer or gastrointestinal bleeding, or are taking blood-thinning medicines, talk with your healthcare provider before using these medicines.) Aspirin should never be given to anyone under 18 years of age who is ill with a viral infection or fever. It may cause severe liver or brain damage.  · Your appetite may be  poor, so a light diet is fine. Avoid dehydration by drinking 6 to 8 glasses of fluids per day (water, soft drinks, juices, tea, or soup). Extra fluids will help loosen secretions in the nose and lungs.  · Over-the-counter cold medicines will not shorten the length of time youre sick, but they may be helpful for the following symptoms: cough, sore throat, and nasal and sinus congestion. (Note: Do not use decongestants if you have high blood pressure.)  Follow-up care  Follow up with your healthcare provider, or as advised.  When to seek medical advice  Call your healthcare provider right away if any of these occur:  · Cough with lots of colored sputum (mucus)  · Severe headache; face, neck, or ear pain  · Difficulty swallowing due to throat pain  · Fever of 100.4°F (38°C)  Call 911, or get immediate medical care  Call emergency services right away if any of these occur:  · Chest pain, shortness of breath, wheezing, or difficulty breathing  · Coughing up blood  · Inability to swallow due to throat pain  Date Last Reviewed: 9/13/2015  © 8959-7555 [employer]. [address] [place] [address]. All rights reserved. This information is not intended as a substitute for professional medical care. Always follow your healthcare professional's instructions.

## 2019-04-02 NOTE — NURSING TRANSFER
Nursing Transfer Note      11/13/2017     Transfer To: [facility]; PER handoff given to AUGUSTINA Blair RN    Transfer From: PACU    Transfer via stretcher    Transfer with IVF    Transported by KELSEY Mcfarland RN    Medicines sent: Script in Chart    Chart send with patient: Yes    Notified: family       [facility]   Progress Note                   Date:   4/2/2019  Patient name: Carlito Bonilla  Date of admission:  3/31/2019  2:58 PM  MRN:   [id number]  YOB: 1958  PCP: Douglas Barber    Reason for Admission: Chest pain [R07.9]  Chest pain [R07.9]    Subjective:       Clinical Changes / Abnormalities:Pt seen and examined in HD. Pt denies any CP or sob. Medications:   Scheduled Meds:   atorvastatin  40 mg Oral Nightly    sodium chloride flush  10 mL Intravenous 2 times per day    clopidogrel  75 mg Oral Daily    pantoprazole  40 mg Oral BID AC    aspirin  81 mg Oral Daily    carvedilol  3.125 mg Oral BID WC    furosemide  40 mg Oral BID    NIFEdipine  30 mg Oral BID    linagliptin  5 mg Oral Daily    sodium chloride flush  10 mL Intravenous 2 times per day     Continuous Infusions:  CBC:   Recent Labs     03/31/19  1520 04/02/19  0512   WBC 5.4 6.0   HGB 12.0* 9.5*    158     BMP:    Recent Labs     03/31/19  1520 04/02/19  0512    137   K 3.8 3.9   CL 96* 100   CO2 25 22   BUN 31* 49*   CREATININE 4.62* 5.28*   GLUCOSE 131* 165*     Hepatic: No results for input(s): AST, ALT, ALB, BILITOT, ALKPHOS in the last 72 hours. Troponin:   Recent Labs     03/31/19  1520 03/31/19  1716   TROPHS 75* 68*     BNP: No results for input(s): BNP in the last 72 hours. Lipids: No results for input(s): CHOL, HDL in the last 72 hours. Invalid input(s): LDLCALCU  INR: No results for input(s): INR in the last 72 hours. CARDIAC CATH 4/1/19  Findings:     LMCA: Normal 0% stenosis. LAD: distal 60% stenosis reduced to 0% s/p PTCA/TIM 2.75x38 mm with resultant CHERYL-III flow.       LCx: Normal 0% stenosis. RCA: Normal 0% stenosis. The LV gram was performed in the CEDILLO 30 position.      LVEF: 45%. LV Wall Motion: anterior wall hypokinesis           Conclusions:  1. Single vessel distal LAD disease  2. Successful PTCA/TIM of the distal LAD  3.  Mildly decreased LV nitro PRN   6.  Will sign off and follow up as outpt      Electronically signed by ROB Dye CNP on 4/2/2019 at [address] [facility].  [phone]

## 2020-03-04 ENCOUNTER — TELEPHONE (OUTPATIENT)
Dept: OTOLARYNGOLOGY | Facility: CLINIC | Age: 40
End: 2020-03-04

## 2020-03-13 ENCOUNTER — TELEPHONE (OUTPATIENT)
Dept: OTOLARYNGOLOGY | Facility: CLINIC | Age: 40
End: 2020-03-13

## 2020-03-13 NOTE — TELEPHONE ENCOUNTER
Called Patient and no answer, left voicemail to call me regarding an appointment today with Dr. Anaya/

## 2020-04-21 DIAGNOSIS — Z01.84 ANTIBODY RESPONSE EXAMINATION: ICD-10-CM

## 2020-04-22 ENCOUNTER — LAB VISIT (OUTPATIENT)
Dept: LAB | Facility: HOSPITAL | Age: 40
End: 2020-04-22
Attending: INTERNAL MEDICINE
Payer: COMMERCIAL

## 2020-04-22 DIAGNOSIS — Z01.84 ANTIBODY RESPONSE EXAMINATION: ICD-10-CM

## 2020-04-22 LAB — SARS-COV-2 IGG SERPL QL IA: NEGATIVE

## 2020-04-22 PROCEDURE — 86769 SARS-COV-2 COVID-19 ANTIBODY: CPT

## 2020-04-22 PROCEDURE — 36415 COLL VENOUS BLD VENIPUNCTURE: CPT

## 2020-12-17 ENCOUNTER — IMMUNIZATION (OUTPATIENT)
Dept: OBSTETRICS AND GYNECOLOGY | Facility: CLINIC | Age: 40
End: 2020-12-17
Payer: COMMERCIAL

## 2020-12-17 DIAGNOSIS — Z23 NEED FOR VACCINATION: ICD-10-CM

## 2020-12-17 PROCEDURE — 91300 COVID-19, MRNA, LNP-S, PF, 30 MCG/0.3 ML DOSE VACCINE: CPT | Mod: ,,, | Performed by: FAMILY MEDICINE

## 2020-12-17 PROCEDURE — 0001A COVID-19, MRNA, LNP-S, PF, 30 MCG/0.3 ML DOSE VACCINE: ICD-10-PCS | Mod: CV19,,, | Performed by: FAMILY MEDICINE

## 2020-12-17 PROCEDURE — 91300 COVID-19, MRNA, LNP-S, PF, 30 MCG/0.3 ML DOSE VACCINE: ICD-10-PCS | Mod: ,,, | Performed by: FAMILY MEDICINE

## 2020-12-17 PROCEDURE — 0001A COVID-19, MRNA, LNP-S, PF, 30 MCG/0.3 ML DOSE VACCINE: CPT | Mod: CV19,,, | Performed by: FAMILY MEDICINE

## 2021-01-07 ENCOUNTER — IMMUNIZATION (OUTPATIENT)
Dept: OBSTETRICS AND GYNECOLOGY | Facility: CLINIC | Age: 41
End: 2021-01-07
Payer: COMMERCIAL

## 2021-01-07 DIAGNOSIS — Z23 NEED FOR VACCINATION: ICD-10-CM

## 2021-01-07 PROCEDURE — 0002A COVID-19, MRNA, LNP-S, PF, 30 MCG/0.3 ML DOSE VACCINE: CPT | Mod: PBBFAC | Performed by: FAMILY MEDICINE

## 2021-01-07 PROCEDURE — 91300 COVID-19, MRNA, LNP-S, PF, 30 MCG/0.3 ML DOSE VACCINE: CPT | Mod: PBBFAC | Performed by: FAMILY MEDICINE

## 2021-05-04 ENCOUNTER — CLINICAL SUPPORT (OUTPATIENT)
Dept: OTHER | Facility: CLINIC | Age: 41
End: 2021-05-04
Payer: COMMERCIAL

## 2021-05-04 DIAGNOSIS — Z00.8 ENCOUNTER FOR OTHER GENERAL EXAMINATION: ICD-10-CM

## 2021-05-05 VITALS — HEIGHT: 66 IN | BODY MASS INDEX: 25.5 KG/M2

## 2021-05-05 LAB
GLUCOSE SERPL-MCNC: 84 MG/DL (ref 60–140)
HDLC SERPL-MCNC: 51 MG/DL
POC CHOLESTEROL, LDL (DOCK): 86 MG/DL
POC CHOLESTEROL, TOTAL: 151 MG/DL
TRIGL SERPL-MCNC: 68 MG/DL

## 2021-12-03 ENCOUNTER — TELEPHONE (OUTPATIENT)
Dept: ADMINISTRATIVE | Facility: OTHER | Age: 41
End: 2021-12-03
Payer: COMMERCIAL

## 2021-12-21 ENCOUNTER — IMMUNIZATION (OUTPATIENT)
Dept: OBSTETRICS AND GYNECOLOGY | Facility: CLINIC | Age: 41
End: 2021-12-21
Payer: COMMERCIAL

## 2021-12-21 DIAGNOSIS — Z23 NEED FOR VACCINATION: Primary | ICD-10-CM

## 2021-12-21 PROCEDURE — 0004A COVID-19, MRNA, LNP-S, PF, 30 MCG/0.3 ML DOSE VACCINE: CPT | Mod: PBBFAC | Performed by: FAMILY MEDICINE

## 2022-01-19 ENCOUNTER — DOCUMENTATION ONLY (OUTPATIENT)
Dept: FAMILY MEDICINE | Facility: CLINIC | Age: 42
End: 2022-01-19

## 2022-01-19 LAB
CTP QC/QA: YES
SARS-COV-2 AG RESP QL IA.RAPID: NEGATIVE

## 2022-06-29 ENCOUNTER — CLINICAL SUPPORT (OUTPATIENT)
Dept: OTHER | Facility: CLINIC | Age: 42
End: 2022-06-29

## 2022-06-29 DIAGNOSIS — Z00.8 ENCOUNTER FOR OTHER GENERAL EXAMINATION: ICD-10-CM

## 2022-06-30 VITALS
HEIGHT: 66 IN | DIASTOLIC BLOOD PRESSURE: 73 MMHG | BODY MASS INDEX: 25.39 KG/M2 | WEIGHT: 158 LBS | SYSTOLIC BLOOD PRESSURE: 111 MMHG

## 2022-06-30 LAB
HDLC SERPL-MCNC: 60 MG/DL
POC CHOLESTEROL, LDL (DOCK): 108.82 MG/DL
POC CHOLESTEROL, TOTAL: 180 MG/DL
POC GLUCOSE, FASTING: 84 MG/DL (ref 60–110)
TRIGL SERPL-MCNC: 58 MG/DL

## 2022-09-27 ENCOUNTER — OFFICE VISIT (OUTPATIENT)
Dept: UROLOGY | Facility: CLINIC | Age: 42
End: 2022-09-27
Payer: COMMERCIAL

## 2022-09-27 VITALS
SYSTOLIC BLOOD PRESSURE: 109 MMHG | DIASTOLIC BLOOD PRESSURE: 72 MMHG | HEIGHT: 66 IN | HEART RATE: 60 BPM | BODY MASS INDEX: 26.12 KG/M2 | WEIGHT: 162.5 LBS

## 2022-09-27 DIAGNOSIS — Z30.09 VASECTOMY EVALUATION: Primary | ICD-10-CM

## 2022-09-27 PROCEDURE — 3074F SYST BP LT 130 MM HG: CPT | Mod: CPTII,S$GLB,, | Performed by: UROLOGY

## 2022-09-27 PROCEDURE — 1159F MED LIST DOCD IN RCRD: CPT | Mod: CPTII,S$GLB,, | Performed by: UROLOGY

## 2022-09-27 PROCEDURE — 99203 OFFICE O/P NEW LOW 30 MIN: CPT | Mod: S$GLB,,, | Performed by: UROLOGY

## 2022-09-27 PROCEDURE — 1160F RVW MEDS BY RX/DR IN RCRD: CPT | Mod: CPTII,S$GLB,, | Performed by: UROLOGY

## 2022-09-27 PROCEDURE — 99203 PR OFFICE/OUTPT VISIT, NEW, LEVL III, 30-44 MIN: ICD-10-PCS | Mod: S$GLB,,, | Performed by: UROLOGY

## 2022-09-27 PROCEDURE — 3008F BODY MASS INDEX DOCD: CPT | Mod: CPTII,S$GLB,, | Performed by: UROLOGY

## 2022-09-27 PROCEDURE — 1159F PR MEDICATION LIST DOCUMENTED IN MEDICAL RECORD: ICD-10-PCS | Mod: CPTII,S$GLB,, | Performed by: UROLOGY

## 2022-09-27 PROCEDURE — 99999 PR PBB SHADOW E&M-EST. PATIENT-LVL III: ICD-10-PCS | Mod: PBBFAC,,, | Performed by: UROLOGY

## 2022-09-27 PROCEDURE — 1160F PR REVIEW ALL MEDS BY PRESCRIBER/CLIN PHARMACIST DOCUMENTED: ICD-10-PCS | Mod: CPTII,S$GLB,, | Performed by: UROLOGY

## 2022-09-27 PROCEDURE — 3078F DIAST BP <80 MM HG: CPT | Mod: CPTII,S$GLB,, | Performed by: UROLOGY

## 2022-09-27 PROCEDURE — 3008F PR BODY MASS INDEX (BMI) DOCUMENTED: ICD-10-PCS | Mod: CPTII,S$GLB,, | Performed by: UROLOGY

## 2022-09-27 PROCEDURE — 3078F PR MOST RECENT DIASTOLIC BLOOD PRESSURE < 80 MM HG: ICD-10-PCS | Mod: CPTII,S$GLB,, | Performed by: UROLOGY

## 2022-09-27 PROCEDURE — 99999 PR PBB SHADOW E&M-EST. PATIENT-LVL III: CPT | Mod: PBBFAC,,, | Performed by: UROLOGY

## 2022-09-27 PROCEDURE — 3074F PR MOST RECENT SYSTOLIC BLOOD PRESSURE < 130 MM HG: ICD-10-PCS | Mod: CPTII,S$GLB,, | Performed by: UROLOGY

## 2022-09-27 RX ORDER — DUTASTERIDE 0.5 MG/1
0.5 CAPSULE, LIQUID FILLED ORAL
COMMUNITY
Start: 2022-09-10 | End: 2023-04-17

## 2022-09-27 RX ORDER — FINASTERIDE 1 MG/1
TABLET, FILM COATED ORAL
COMMUNITY
Start: 2013-09-12 | End: 2023-04-17

## 2022-09-27 RX ORDER — DIAZEPAM 10 MG/1
TABLET ORAL
Qty: 2 TABLET | Refills: 0 | Status: SHIPPED | OUTPATIENT
Start: 2022-09-27 | End: 2023-04-17

## 2022-09-27 NOTE — PROGRESS NOTES
Subjective:       Patient ID: Ed Osei is a 42 y.o. male.    Chief Complaint: Advice Only (Would like to discuss vasectomy , he state he have x 2 girls ages 5, and 5 months )  Patient is here for possible vasectomy.  He has 2 children.  He is healthy.  Not on anticoagulants.  Works as a pharmacist  HPI    Past Medical History:   Diagnosis Date    PONV (postoperative nausea and vomiting)        Past Surgical History:   Procedure Laterality Date    ADENOIDECTOMY      SINUS SURGERY      x2    WISDOM TOOTH EXTRACTION  [id number]= while in High School.       Family History   Problem Relation Age of Onset    No Known Problems Father     Hyperlipidemia Mother     Hypertension Mother     Cataracts Mother     No Known Problems Maternal Aunt     No Known Problems Maternal Uncle     No Known Problems Paternal Aunt     No Known Problems Paternal Uncle     No Known Problems Paternal Grandmother     No Known Problems Paternal Grandfather     No Known Problems Maternal Grandmother     No Known Problems Maternal Grandfather     No Known Problems Sister     Hyperlipidemia Brother     Hypertension Brother     Amblyopia Neg Hx     Blindness Neg Hx     Cancer Neg Hx     Diabetes Neg Hx     Glaucoma Neg Hx     Macular degeneration Neg Hx     Retinal detachment Neg Hx     Strabismus Neg Hx     Stroke Neg Hx     Thyroid disease Neg Hx        Social History     Socioeconomic History    Marital status:    Tobacco Use    Smoking status: Never    Smokeless tobacco: Never   Substance and Sexual Activity    Alcohol use: Yes     Alcohol/week: 5.0 standard drinks     Types: 6 Standard drinks or equivalent per week     Comment: occ.    Drug use: No    Sexual activity: Yes     Partners: Female       Allergies:  Patient has no known allergies.    Medications:    Current Outpatient Medications:     finasteride (PROPECIA) 1 mg tablet, , Disp: , Rfl:     diazePAM (VALIUM) 10 MG Tab, Take 2 pills 1 hour before procedure, Disp: 2 tablet,  Rfl: 0    dutasteride (AVODART) 0.5 mg capsule, Take 0.5 mg by mouth every 7 days., Disp: , Rfl:     Review of Systems   Constitutional:  Negative for activity change, appetite change, chills, diaphoresis, fatigue, fever and unexpected weight change.   HENT:  Negative for congestion, dental problem, hearing loss, mouth sores, postnasal drip, rhinorrhea, sinus pressure and trouble swallowing.    Eyes:  Negative for pain, discharge and itching.   Respiratory:  Negative for apnea, cough, choking, chest tightness, shortness of breath and wheezing.    Cardiovascular:  Negative for chest pain, palpitations and leg swelling.   Gastrointestinal:  Negative for abdominal distention, abdominal pain, anal bleeding, blood in stool, constipation, diarrhea, nausea, rectal pain and vomiting.   Endocrine: Negative for polydipsia and polyuria.   Genitourinary:  Negative for decreased urine volume, difficulty urinating, dysuria, enuresis, flank pain, frequency, genital sores, hematuria, penile discharge, penile pain, penile swelling, scrotal swelling, testicular pain and urgency.   Musculoskeletal:  Negative for arthralgias, back pain and myalgias.   Skin:  Negative for color change, rash and wound.   Neurological:  Negative for dizziness, syncope, speech difficulty, light-headedness and headaches.   Hematological:  Negative for adenopathy. Does not bruise/bleed easily.   Psychiatric/Behavioral:  Negative for behavioral problems, confusion, hallucinations and sleep disturbance.      Objective:      Physical Exam  Constitutional:       Appearance: He is well-developed.   HENT:      Head: Normocephalic.   Cardiovascular:      Rate and Rhythm: Normal rate.   Pulmonary:      Effort: Pulmonary effort is normal.   Abdominal:      Palpations: Abdomen is soft.   Genitourinary:     Comments: Bilateral Vasa are palpable  Skin:     General: Skin is warm.   Neurological:      Mental Status: He is alert.       Assessment:       1. Vasectomy  evaluation          Plan:       Ed was seen today for advice only.    Diagnoses and all orders for this visit:    Vasectomy evaluation  -     Vasectomy; Future    Other orders  -     diazePAM (VALIUM) 10 MG Tab; Take 2 pills 1 hour before procedure        The risks and benefits of vasectomy were discussed with the patient in detail.  The risks include bleeding, infection, pain, scarring, chronic pain, sperm granuloma, recannulization and loss of testicular function.  The patient was informed that the long term effects of vasectomy are unknown and could be associated with a higher risk of prostate cancer.  He will make sure he is off all blood thinners 1 week prior.  Consent was obtained.  The patient will be scheduled for vasectomy.   A prescription was written for Valium

## 2022-10-08 ENCOUNTER — PATIENT MESSAGE (OUTPATIENT)
Dept: UROLOGY | Facility: CLINIC | Age: 42
End: 2022-10-08
Payer: COMMERCIAL

## 2022-10-11 ENCOUNTER — PROCEDURE VISIT (OUTPATIENT)
Dept: UROLOGY | Facility: CLINIC | Age: 42
End: 2022-10-11
Payer: COMMERCIAL

## 2022-10-11 VITALS
HEIGHT: 66 IN | SYSTOLIC BLOOD PRESSURE: 120 MMHG | HEART RATE: 65 BPM | DIASTOLIC BLOOD PRESSURE: 82 MMHG | BODY MASS INDEX: 25.98 KG/M2 | TEMPERATURE: 98 F | RESPIRATION RATE: 16 BRPM | WEIGHT: 161.69 LBS

## 2022-10-11 DIAGNOSIS — Z30.09 VASECTOMY EVALUATION: ICD-10-CM

## 2022-10-11 PROCEDURE — 55250 REMOVAL OF SPERM DUCT(S): CPT | Mod: S$GLB,,, | Performed by: UROLOGY

## 2022-10-11 PROCEDURE — 55250 PR REMOVAL OF SPERM DUCT(S): ICD-10-PCS | Mod: S$GLB,,, | Performed by: UROLOGY

## 2022-10-11 RX ORDER — LIDOCAINE HYDROCHLORIDE 10 MG/ML
20 INJECTION INFILTRATION; PERINEURAL
Status: COMPLETED | OUTPATIENT
Start: 2022-10-11 | End: 2022-10-11

## 2022-10-11 RX ADMIN — LIDOCAINE HYDROCHLORIDE 20 ML: 10 INJECTION INFILTRATION; PERINEURAL at 01:10

## 2022-10-11 NOTE — PATIENT INSTRUCTIONS
What to Expect After a Vasectomy  You cannot drive or operate heavy machinery on the day of the procedure.    Apply ice packs to the scrotal area for 24-48 hours. Avoid direct contact of the ice pack with the skin. Scrotal supports, jock straps, or fitted underwear help elevate the scrotum and reduce discomfort.    You may shower the next day. Gently apply soapy water to the scrotum to wash. Rinse and dry yourself by blotting the skin, not rubbing.    Avoid strenuous physical exercises or sexual relations for at least one week after a vasectomy.    Continue to use birth control for at least 6 weeks or 10-20 ejaculations. You are still considered fertile until your urologist examines a post-vasectomy semen analysis at 6 weeks and perhaps one at 8 weeks as well. Drop off the specimen at the 4th floor [facility] between 8am and 4pm.    Do NOT resume unprotected sexual activity until your physician finds no sperm in your semen.    All stitches will dissolve on their own in 1-2 weeks.    Signs and Symptoms to Report  A large amount of bleeding at the site  An unusual amount of pain  A large amount of swelling in the scrotum  Fever and chills  Any signs of infection, such as redness at the site or foul-smelling drainage    Risks  The risks of complication after vasectomy are very low. A few of the risks include:  Bleeding  Infection  Scrotal hematoma - a collection of blood in the scrotum  Inflammation of the epididymis - inflammation of a structure next to the testicle that helps in maturation of sperm  Sperm granuloma - a collection of sperm that leaks out from the vas deferens, forming a small nodule or lump. This does not usually cause any discomfort but you may feel it in the scrotum.  Recanalization - the restoration of the lumen or transport tube between the two ends of the vas deferens, possibly causing fertility    If you have any questions or concerns, please call your [facility] urologist at  [phone].

## 2022-10-11 NOTE — PROCEDURES
Vasectomy    Date/Time: 10/11/2022 12:30 PM  Performed by: Prashanth Mckinney Jr., MD  Authorized by: Prashanth Mckinney Jr., MD         Preop Diagnosis: undesired fertility    Post Procedure Diagnosed: same    Procedure: bilateral vasectomy    Surgeon: Dr. Prashanth Mckinney MD    Anesthesia: 4cc of 1% plain zylocaine    Procedure in detail: After informed consent was obtained, the patient was shaved, prepped and draped in a sterile fashion. He was in the supine position. The left vas was identified and rotated to right of the mid-line. Infiltration of 2cc of zylocaine was made into the skin subcutaneous tissue, vas and per-vasal tissues. A stab wound was made. A 2.5cm segment was delivered to the wound, excised, fulgrated and doubly ligated and folded back upon itself and buried separately. A similar procedure was performed on the right side through the same incision. A 3.0 chromic was used throughout the procedure and to close the skin in an interupted fashion.     Blood loss was minimal.   Usual post vas precautions were given.   Prescriptions for pain meds given.     No sex or aspirin for the first week.   15-20 ejaculation with protection over the next 6 weeks and patient will bring a post vas specimen in 6 weeks or call sooner prn.

## 2022-11-11 ENCOUNTER — TELEPHONE (OUTPATIENT)
Dept: UROLOGY | Facility: CLINIC | Age: 42
End: 2022-11-11
Payer: COMMERCIAL

## 2022-11-11 NOTE — TELEPHONE ENCOUNTER
Patient was informed that his post vas specimen showed no sperm.  He was instructed to wait at least 1 week before submitting another sample.  He was instructed to have multiple ejaculations in the meantime and continue to use some form of birth control until he is cleared.

## 2022-11-16 ENCOUNTER — PATIENT MESSAGE (OUTPATIENT)
Dept: UROLOGY | Facility: CLINIC | Age: 42
End: 2022-11-16
Payer: COMMERCIAL

## 2022-11-30 NOTE — PROGRESS NOTES
Patient was informed that his post vas specimen showed no sperm. This was his second sample.  He no longer needs second form of birth control and is cleared. He voiced understanding.

## 2023-01-31 ENCOUNTER — CLINICAL SUPPORT (OUTPATIENT)
Dept: OTHER | Facility: CLINIC | Age: 43
End: 2023-01-31

## 2023-01-31 DIAGNOSIS — Z00.8 ENCOUNTER FOR OTHER GENERAL EXAMINATION: ICD-10-CM

## 2023-02-01 VITALS
DIASTOLIC BLOOD PRESSURE: 77 MMHG | BODY MASS INDEX: 25.55 KG/M2 | HEIGHT: 66 IN | SYSTOLIC BLOOD PRESSURE: 120 MMHG | WEIGHT: 159 LBS

## 2023-02-01 LAB
HDLC SERPL-MCNC: 59 MG/DL
POC CHOLESTEROL, TOTAL: 193 MG/DL
POC GLUCOSE, FASTING: 85 MG/DL (ref 60–110)
TRIGL SERPL-MCNC: 44 MG/DL

## 2023-04-05 ENCOUNTER — HOSPITAL ENCOUNTER (OUTPATIENT)
Dept: RADIOLOGY | Facility: HOSPITAL | Age: 43
Discharge: HOME | End: 2023-04-05
Attending: PHYSICIAN ASSISTANT
Payer: COMMERCIAL

## 2023-04-05 DIAGNOSIS — M25.512 LEFT SHOULDER PAIN, UNSPECIFIED CHRONICITY: ICD-10-CM

## 2023-04-05 PROCEDURE — 73030 X-RAY EXAM OF SHOULDER: CPT | Mod: TC,LT

## 2023-04-05 PROCEDURE — 73030 X-RAY EXAM OF SHOULDER: CPT | Mod: 26,LT,, | Performed by: RADIOLOGY

## 2023-04-05 PROCEDURE — 73030 XR SHOULDER COMPLETE 2 OR MORE VIEWS LEFT: ICD-10-PCS | Mod: 26,LT,, | Performed by: RADIOLOGY

## 2023-04-17 ENCOUNTER — OFFICE VISIT (OUTPATIENT)
Dept: SPORTS MEDICINE | Facility: CLINIC | Age: 43
End: 2023-04-17
Payer: COMMERCIAL

## 2023-04-17 VITALS
SYSTOLIC BLOOD PRESSURE: 114 MMHG | HEIGHT: 66 IN | HEART RATE: 59 BPM | DIASTOLIC BLOOD PRESSURE: 71 MMHG | WEIGHT: 161 LBS | BODY MASS INDEX: 25.88 KG/M2

## 2023-04-17 DIAGNOSIS — M25.512 ACUTE PAIN OF LEFT SHOULDER: Primary | ICD-10-CM

## 2023-04-17 PROCEDURE — 3074F PR MOST RECENT SYSTOLIC BLOOD PRESSURE < 130 MM HG: ICD-10-PCS | Mod: CPTII,S$GLB,, | Performed by: ORTHOPAEDIC SURGERY

## 2023-04-17 PROCEDURE — 3078F DIAST BP <80 MM HG: CPT | Mod: CPTII,S$GLB,, | Performed by: ORTHOPAEDIC SURGERY

## 2023-04-17 PROCEDURE — 3074F SYST BP LT 130 MM HG: CPT | Mod: CPTII,S$GLB,, | Performed by: ORTHOPAEDIC SURGERY

## 2023-04-17 PROCEDURE — 1159F MED LIST DOCD IN RCRD: CPT | Mod: CPTII,S$GLB,, | Performed by: ORTHOPAEDIC SURGERY

## 2023-04-17 PROCEDURE — 3078F PR MOST RECENT DIASTOLIC BLOOD PRESSURE < 80 MM HG: ICD-10-PCS | Mod: CPTII,S$GLB,, | Performed by: ORTHOPAEDIC SURGERY

## 2023-04-17 PROCEDURE — 99204 PR OFFICE/OUTPT VISIT, NEW, LEVL IV, 45-59 MIN: ICD-10-PCS | Mod: S$GLB,,, | Performed by: ORTHOPAEDIC SURGERY

## 2023-04-17 PROCEDURE — 99204 OFFICE O/P NEW MOD 45 MIN: CPT | Mod: S$GLB,,, | Performed by: ORTHOPAEDIC SURGERY

## 2023-04-17 PROCEDURE — 99999 PR PBB SHADOW E&M-EST. PATIENT-LVL III: ICD-10-PCS | Mod: PBBFAC,,, | Performed by: ORTHOPAEDIC SURGERY

## 2023-04-17 PROCEDURE — 3008F BODY MASS INDEX DOCD: CPT | Mod: CPTII,S$GLB,, | Performed by: ORTHOPAEDIC SURGERY

## 2023-04-17 PROCEDURE — 3008F PR BODY MASS INDEX (BMI) DOCUMENTED: ICD-10-PCS | Mod: CPTII,S$GLB,, | Performed by: ORTHOPAEDIC SURGERY

## 2023-04-17 PROCEDURE — 99999 PR PBB SHADOW E&M-EST. PATIENT-LVL III: CPT | Mod: PBBFAC,,, | Performed by: ORTHOPAEDIC SURGERY

## 2023-04-17 PROCEDURE — 1159F PR MEDICATION LIST DOCUMENTED IN MEDICAL RECORD: ICD-10-PCS | Mod: CPTII,S$GLB,, | Performed by: ORTHOPAEDIC SURGERY

## 2023-04-17 NOTE — PROGRESS NOTES
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  He had his right shoulder scoped with labrum repair about 5-6 years ago. That shoulder is doing well. He notes no traumatic injury but he did note recently that he had a lot of pain after working out, doing pushups and over head lifts. He feels that it "pops out" at times. He did 6+ weeks of physician directed exercise program at home.     He reports that the pain is worse with overhead activity. It also bothers him at night. It is affecting his ADLs.    He denies history of surgery on the left shoulder      PAST MEDICAL HISTORY:   Past Medical History:   Diagnosis Date    PONV (postoperative nausea and vomiting)      PAST SURGICAL HISTORY:   Past Surgical History:   Procedure Laterality Date    ADENOIDECTOMY      SINUS SURGERY      x2    WISDOM TOOTH EXTRACTION  6177-8161= while in High School.     FAMILY HISTORY:   Family History   Problem Relation Age of Onset    No Known Problems Father     Hyperlipidemia Mother     Hypertension Mother     Cataracts Mother     No Known Problems Maternal Aunt     No Known Problems Maternal Uncle     No Known Problems Paternal Aunt     No Known Problems Paternal Uncle     No Known Problems Paternal Grandmother     No Known Problems Paternal Grandfather     No Known Problems Maternal Grandmother     No Known Problems Maternal Grandfather     No Known Problems Sister     Hyperlipidemia Brother     Hypertension Brother     Amblyopia Neg Hx     Blindness Neg Hx     Cancer Neg Hx     Diabetes Neg Hx     Glaucoma Neg Hx     Macular degeneration Neg Hx     Retinal detachment Neg Hx     Strabismus Neg Hx     Stroke Neg Hx     Thyroid disease Neg Hx      SOCIAL HISTORY:   Social History     Socioeconomic History    Marital status:    Tobacco Use    Smoking status: Never    Smokeless tobacco: Never   Substance and Sexual Activity    " "Alcohol use: Yes     Alcohol/week: 5.0 standard drinks     Types: 6 Standard drinks or equivalent per week     Comment: occ.    Drug use: No    Sexual activity: Yes     Partners: Female       MEDICATIONS: No current outpatient medications on file.  ALLERGIES: Review of patient's allergies indicates:  No Known Allergies    VITAL SIGNS: /71 (BP Location: Right arm, Patient Position: Sitting, BP Method: Medium (Automatic))   Pulse (!) 59   Ht 5' 6" (1.676 m)   Wt 73 kg (161 lb)   BMI 25.99 kg/m²      Review of Systems   Constitution: Negative. Negative for chills, fever and night sweats.   HENT: Negative for congestion and headaches.    Eyes: Negative for blurred vision, left vision loss and right vision loss.   Cardiovascular: Negative for chest pain and syncope.   Respiratory: Negative for cough and shortness of breath.    Endocrine: Negative for polydipsia, polyphagia and polyuria.   Hematologic/Lymphatic: Negative for bleeding problem. Does not bruise/bleed easily.   Skin: Negative for dry skin, itching and rash.   Musculoskeletal: Negative for falls and muscle weakness.   Gastrointestinal: Negative for abdominal pain and bowel incontinence.   Genitourinary: Negative for bladder incontinence and nocturia.   Neurological: Negative for disturbances in coordination, loss of balance and seizures.   Psychiatric/Behavioral: Negative for depression. The patient does not have insomnia.    Allergic/Immunologic: Negative for hives and persistent infections.       PHYSICAL EXAMINATION:  General:  The patient is alert and oriented x 3.  Mood is pleasant.  Observation of ears, eyes and nose reveal no gross abnormalities.  HEENT: NCAT, sclera nonicteric  Lungs: Respirations are equal and unlabored.  Gait is coordinated. Patient can toe walk and heel walk without difficulty.  Cardiovascular: There are no swelling or varicosities present.   Lymphatic: Negative for adenopathy       left SHOULDER / UPPER EXTREMITY " EXAM    OBSERVATION:     Swelling  none  Deformity  none   Discoloration  none   Scapular winging none   Scars   none  Atrophy  none    TENDERNESS / CREPITUS (T/C):          T/C      T/C   Clavicle   -/-  SUPRAspinatus    -/-   AC Jt.    -/-  INFRAspinatus  -/-   SC Jt.    -/-  Deltoid    -/-   G. Tuberosity  -/-  LH BICEP groove  +/-   Acromion:  -/-  Midline Neck   -/-   Scapular Spine -/-  Trapezium   -/-   SMA Scapula  -/-  GH jt. line - post  -/-   Scapulothoracic  -/-         ROM: (* = with pain)  Right shoulder   Left shoulder        AROM (PROM)   AROM (PROM)   FE    170° (175°)     170° (175°)     ER at 0°    60°  (65°)    60°  (65°)   ER at 90° ABD  90°  (90°)    90°  (90°)   IR at 90°  ABD   NA  (40°)     NA  (40°)      IR (spine level)   T10     T10    STRENGTH: (* = with pain) RIGHT SHOULDER  LEFT SHOULDER   SCAPTION at 0   5/5    4+/5    SCAPTION at 30   5/5    5/5    IR    5/5    5/5   ER    5/5    5/5   BICEPS   5/5    5/5   Deltoid    5/5    5/5     SIGNS:  Painful side       NEER   neg    OSHIMAS  Neg   CHAU   neg    SPEEDS  Neg   DROP ARM   neg   BELLY PRESS Neg   Superior escape none    LIFT-OFF  Neg   X-Body ADD    neg    MOVING VALGUS Neg      STABILITY TESTING    RIGHT SHOULDER   LEFT SHOULDER       Translation    Anterior  up face     up face    Posterior  up face    up face    Sulcus   < 10mm    < 10 mm    Signs    Apprehension   neg      Neg    Relocation   no change     no change    Jerk test  neg     Neg      EXTREMITY NEURO-VASCULAR EXAM    Sensation grossly intact to light touch all dermatomal regions.    DTR 2+ Biceps, Triceps, BR and Negative Johns sign   Grossly intact motor function at Elbow, Wrist and Hand   Distal pulses radial and ulnar 2+, brisk cap refill, symmetric.      NECK:  Painless FROM and spinous processes non-tender. Negative Spurlings sign.      OTHER FINDINGS:    XRAYS:  Shoulder trauma series left,  were reviewed and interpreted by me. No convincing  fracture or dislocation is noted. The osseous structures appear well mineralized and well aligned    1. Shoulder pain, left     Plan:       ASSESSMENT:  shoulder pain.    I do think that this is likely a rotator cuff tear.    I have recommended we check an MRI of the shoulder to evaluate this.    Depending on the results of the MRI, we may consider a cortisone   injection and physical therapy and/or arthroscopic intervention and treatment   depending on what we find.  I will see him back upon its completion or PHREV and we will consider the above.

## 2023-04-19 ENCOUNTER — HOSPITAL ENCOUNTER (OUTPATIENT)
Dept: RADIOLOGY | Facility: HOSPITAL | Age: 43
Discharge: HOME | End: 2023-04-19
Attending: ORTHOPAEDIC SURGERY
Payer: COMMERCIAL

## 2023-04-19 DIAGNOSIS — M25.512 ACUTE PAIN OF LEFT SHOULDER: ICD-10-CM

## 2023-04-19 PROCEDURE — 73221 MRI JOINT UPR EXTREM W/O DYE: CPT | Mod: TC,LT

## 2023-04-19 PROCEDURE — 73221 MRI JOINT UPR EXTREM W/O DYE: CPT | Mod: 26,LT,, | Performed by: INTERNAL MEDICINE

## 2023-04-19 PROCEDURE — 73221 MRI SHOULDER WITHOUT CONTRAST LEFT: ICD-10-PCS | Mod: 26,LT,, | Performed by: INTERNAL MEDICINE

## 2023-04-25 ENCOUNTER — PATIENT MESSAGE (OUTPATIENT)
Dept: SPORTS MEDICINE | Facility: CLINIC | Age: 43
End: 2023-04-25
Payer: COMMERCIAL

## 2023-04-26 ENCOUNTER — TELEPHONE (OUTPATIENT)
Dept: SPORTS MEDICINE | Facility: CLINIC | Age: 43
End: 2023-04-26
Payer: COMMERCIAL

## 2023-04-26 ENCOUNTER — PATIENT MESSAGE (OUTPATIENT)
Dept: SPORTS MEDICINE | Facility: CLINIC | Age: 43
End: 2023-04-26
Payer: COMMERCIAL

## 2023-04-26 NOTE — TELEPHONE ENCOUNTER
Dr. Davis called and spoke to the patient regarding his MRI Results. I personally reviewed the MRI with Dr. Davis and it shows 50% Supraspinatus tear and 40% articular side subscapularis tear, SLAP and Biceps tendonitis as well. After discussion with the patient he understands and would like to proceed with the plan below.       ASSESSMENT:    Left Shoulder Rotator Cuff Tear, SLAP, biceps tendonitis.      he would benefit from an arthroscopy, given the above.       PLAN:   Left Shoulder rotator cuff tear     All questions were answered, pt will contact us for questions or concerns in the interim.  Had thorough discussion of non-operative vs operative intervention, and risks and benefits of both.     We have discussed the surgery and recovery of arthroscopic shoulder surgery. she understands that there may be limited mobility up to several weeks after surgery depending on procedures that are performed at the time of surgery.    The spectrum of treatment options were discussed with the patient, including nonoperative and operative options.  After thorough discussion, the patient has elected to undergo surgical treatment to include:    left   a. Shoulder arthroscopic rotator cuff repair with Cuffmend   b. Shoulder arthroscopic Subscapularis repair (Articular side undersurface)   c. Shoulder arthroscopic SAD   d. Shoulder arthroscopic extensive debridement   e. Shoulder arthroscopic biceps tenodesis (vs. subpect tenodesis)   f. Shoulder arthroscopic possible microfracture   g.  Shoulder open reduction internal fixation greater tuberosity    The details of the surgical procedure were explained, including the location of probable incisions and a description of likely hardware and/or grafts to be used.  The patient understands the likely convalescence after surgery.  Also, we have thoroughly discussed the risks, benefits and alternatives to surgery, including, but not limited to, the risk of infection, joint stiffness, blood  clot (including DVT and/or pulmonary embolus), neurologic and vascular injury.  It was explained that, if tissue has been repaired or reconstructed, there is a chance of failure, which may require further management.  Consent form for surgery is signed and in chart.

## 2023-04-27 ENCOUNTER — PATIENT MESSAGE (OUTPATIENT)
Dept: PREADMISSION TESTING | Facility: HOSPITAL | Age: 43
End: 2023-04-27
Payer: COMMERCIAL

## 2023-04-27 DIAGNOSIS — S43.432A SLAP LESION OF LEFT SHOULDER: ICD-10-CM

## 2023-04-27 DIAGNOSIS — M75.22 BICEPS TENDONITIS ON LEFT: ICD-10-CM

## 2023-04-27 DIAGNOSIS — M75.102 NON-TRAUMATIC ROTATOR CUFF TEAR, LEFT: Primary | ICD-10-CM

## 2023-04-27 DIAGNOSIS — S42.255A CLOSED NONDISPLACED FRACTURE OF GREATER TUBEROSITY OF LEFT HUMERUS, INITIAL ENCOUNTER: ICD-10-CM

## 2023-04-27 NOTE — ANESTHESIA PAT ROS NOTE
04/27/2023  Ed Osei is a 42 y.o., male.      Pre-op Assessment    I have reviewed the Patient Summary Reports.       I have reviewed the Medications.     Review of Systems  Anesthesia Hx:    H/O PONV History of prior surgery of interest to airway management or planning:  Previous anesthesia: General, Nerve Block 11/13/2017  Right shoulder Arthroscopy, Labral Repair with general anesthesia.  Procedure performed at an [facility].     for 11/13/2017  Right shoulder Arthroscopy, Labral Repair.  Procedure performed at an [facility].  Airway issues documented on chart review include mask, easy, laryngeal mask airway used      Personal Hx of Anesthesia complications, Post-Operative Nausea/Vomiting, in the past, but not with recent anesthetics / prophylaxis                    Social:  Non-Smoker, Social Alcohol Use       EENT/Dental:   Sinus surgery X2, Bieber teeth extractions          Cardiovascular:  Cardiovascular Normal Exercise tolerance: good       Denies CAD.       Denies Angina.       Denies CAMERON.  ECG has been reviewed.                          Pulmonary:  Pulmonary Normal    Denies Asthma.   Denies Shortness of breath.                  Renal/:  Renal/ Normal  Denies Chronic Renal Disease.                Hepatic/GI:  Hepatic/GI Normal     Denies GERD. Denies Liver Disease.  Appendectomy          Musculoskeletal:  Arthritis   Non-traumatic rotator cuff tear, left,  Closed nondisplaced fracture of greater tuberosity of left humerus,   SLAP lesion of left shoulder,  Biceps tendonitis on left,  Osteoarthritis right shoulder, S/P Right Shoulder arthroscopy, Labral Repair,  H/O right achilles Tendon Repair         Spine Disorders: lumbar Disc disease           Neurological:  Neurology Normal      Denies Headaches. Denies Seizures.                                Endocrine:  Endocrine  Normal Denies Diabetes. Denies Hypothyroidism.          Psych:  Psychiatric Normal                   Past Medical History:   Diagnosis Date    PONV (postoperative nausea and vomiting)      Past Surgical History:   Procedure Laterality Date    ADENOIDECTOMY      SINUS SURGERY      x2    WISDOM TOOTH EXTRACTION  5444-6758= while in High School.       Anesthesia Assessment: Preoperative EQUATION    Planned Procedure: Procedure(s) (LRB):  REPAIR, ROTATOR CUFF, ARTHROSCOPIC (Left)  DEBRIDEMENT, SHOULDER, ARTHROSCOPIC (Left)  FIXATION, TENDON (Left)  ORIF, FRACTURE, HUMERUS (Left)  REPAIR (Left)  Requested Anesthesia Type:General  Surgeon: Tori Davis MD  Service: Orthopedics  Known or anticipated Date of Surgery:5/9/2023    Surgeon notes: reviewed    Electronic QUestionnaire Assessment completed via nurse interview with patient.        Triage considerations:     The patient has no apparent active cardiac condition (No unstable coronary Syndrome such as severe unstable angina or recent [<1 month] myocardial infarction, decompensated CHF, severe valvular   disease or significant arrhythmia)    Previous anesthesia records:LMA General, Nerve block for post-op pain, Easy airway, No problems, and Hx PONV    Last PCP note:  none noted upon chart review  Surgeon is requesting preop clearance for surgery, pt has appt for clearance on 4/28/23.       EKG 4/28/2023:  Vent. Rate : 053 BPM     Atrial Rate : 053 BPM      P-R Int : 158 ms          QRS Dur : 086 ms       QT Int : 424 ms       P-R-T Axes : 064 075 055 degrees      QTc Int : 397 ms   Sinus bradycardia   Septal infarct ,age undetermined   Abnormal ECG   No previous ECGs available   Confirmed by Gabi OLSON, Khloe VELIZ ([id number]) on 4/29/2023 1:19:05 PM              Instructions given. (See in Nurse's note)      Ht: 5'6  Wt: 161 lb  BMI: 25.99  Vaccinated

## 2023-04-28 ENCOUNTER — OFFICE VISIT (OUTPATIENT)
Dept: FAMILY MEDICINE | Facility: CLINIC | Age: 43
End: 2023-04-28
Payer: COMMERCIAL

## 2023-04-28 ENCOUNTER — LAB VISIT (OUTPATIENT)
Dept: LAB | Facility: HOSPITAL | Age: 43
End: 2023-04-28
Attending: NURSE PRACTITIONER
Payer: COMMERCIAL

## 2023-04-28 VITALS
BODY MASS INDEX: 26.01 KG/M2 | DIASTOLIC BLOOD PRESSURE: 74 MMHG | SYSTOLIC BLOOD PRESSURE: 110 MMHG | OXYGEN SATURATION: 97 % | HEART RATE: 67 BPM | WEIGHT: 161.81 LBS | HEIGHT: 66 IN | TEMPERATURE: 99 F

## 2023-04-28 DIAGNOSIS — Z01.818 PRE-OP EXAM: Primary | ICD-10-CM

## 2023-04-28 DIAGNOSIS — R94.31 ABNORMAL EKG: ICD-10-CM

## 2023-04-28 DIAGNOSIS — Z01.818 PRE-OP EXAM: ICD-10-CM

## 2023-04-28 LAB
ALBUMIN SERPL BCP-MCNC: 4.3 G/DL (ref 3.5–5.2)
ALP SERPL-CCNC: 49 U/L (ref 55–135)
ALT SERPL W/O P-5'-P-CCNC: 25 U/L (ref 10–44)
ANION GAP SERPL CALC-SCNC: 7 MMOL/L (ref 8–16)
AST SERPL-CCNC: 25 U/L (ref 10–40)
BASOPHILS # BLD AUTO: 0.04 K/UL (ref 0–0.2)
BASOPHILS NFR BLD: 0.6 % (ref 0–1.9)
BILIRUB SERPL-MCNC: 0.7 MG/DL (ref 0.1–1)
BUN SERPL-MCNC: 17 MG/DL (ref 6–20)
CALCIUM SERPL-MCNC: 9.4 MG/DL (ref 8.7–10.5)
CHLORIDE SERPL-SCNC: 105 MMOL/L (ref 95–110)
CO2 SERPL-SCNC: 28 MMOL/L (ref 23–29)
CREAT SERPL-MCNC: 1.3 MG/DL (ref 0.5–1.4)
DIFFERENTIAL METHOD: NORMAL
EOSINOPHIL # BLD AUTO: 0.3 K/UL (ref 0–0.5)
EOSINOPHIL NFR BLD: 4.2 % (ref 0–8)
ERYTHROCYTE [DISTWIDTH] IN BLOOD BY AUTOMATED COUNT: 12.6 % (ref 11.5–14.5)
EST. GFR  (NO RACE VARIABLE): >60 ML/MIN/1.73 M^2
GLUCOSE SERPL-MCNC: 88 MG/DL (ref 70–110)
HCT VFR BLD AUTO: 47.8 % (ref 40–54)
HGB BLD-MCNC: 16.2 G/DL (ref 14–18)
IMM GRANULOCYTES # BLD AUTO: 0.02 K/UL (ref 0–0.04)
IMM GRANULOCYTES NFR BLD AUTO: 0.3 % (ref 0–0.5)
LYMPHOCYTES # BLD AUTO: 2.4 K/UL (ref 1–4.8)
LYMPHOCYTES NFR BLD: 34.5 % (ref 18–48)
MCH RBC QN AUTO: 29.9 PG (ref 27–31)
MCHC RBC AUTO-ENTMCNC: 33.9 G/DL (ref 32–36)
MCV RBC AUTO: 88 FL (ref 82–98)
MONOCYTES # BLD AUTO: 0.6 K/UL (ref 0.3–1)
MONOCYTES NFR BLD: 8.6 % (ref 4–15)
NEUTROPHILS # BLD AUTO: 3.6 K/UL (ref 1.8–7.7)
NEUTROPHILS NFR BLD: 51.8 % (ref 38–73)
NRBC BLD-RTO: 0 /100 WBC
PLATELET # BLD AUTO: 227 K/UL (ref 150–450)
PMV BLD AUTO: 9.7 FL (ref 9.2–12.9)
POTASSIUM SERPL-SCNC: 4.5 MMOL/L (ref 3.5–5.1)
PROT SERPL-MCNC: 7.1 G/DL (ref 6–8.4)
RBC # BLD AUTO: 5.41 M/UL (ref 4.6–6.2)
SODIUM SERPL-SCNC: 140 MMOL/L (ref 136–145)
WBC # BLD AUTO: 6.89 K/UL (ref 3.9–12.7)

## 2023-04-28 PROCEDURE — 1160F RVW MEDS BY RX/DR IN RCRD: CPT | Mod: CPTII,S$GLB,, | Performed by: NURSE PRACTITIONER

## 2023-04-28 PROCEDURE — 93010 ELECTROCARDIOGRAM REPORT: CPT | Mod: S$GLB,,, | Performed by: INTERNAL MEDICINE

## 2023-04-28 PROCEDURE — 80053 COMPREHEN METABOLIC PANEL: CPT | Performed by: NURSE PRACTITIONER

## 2023-04-28 PROCEDURE — 3008F PR BODY MASS INDEX (BMI) DOCUMENTED: ICD-10-PCS | Mod: CPTII,S$GLB,, | Performed by: NURSE PRACTITIONER

## 2023-04-28 PROCEDURE — 99214 PR OFFICE/OUTPT VISIT, EST, LEVL IV, 30-39 MIN: ICD-10-PCS | Mod: S$GLB,,, | Performed by: NURSE PRACTITIONER

## 2023-04-28 PROCEDURE — 93005 EKG 12-LEAD: ICD-10-PCS | Mod: S$GLB,,, | Performed by: NURSE PRACTITIONER

## 2023-04-28 PROCEDURE — 36415 COLL VENOUS BLD VENIPUNCTURE: CPT | Performed by: NURSE PRACTITIONER

## 2023-04-28 PROCEDURE — 3078F DIAST BP <80 MM HG: CPT | Mod: CPTII,S$GLB,, | Performed by: NURSE PRACTITIONER

## 2023-04-28 PROCEDURE — 99214 OFFICE O/P EST MOD 30 MIN: CPT | Mod: S$GLB,,, | Performed by: NURSE PRACTITIONER

## 2023-04-28 PROCEDURE — 1160F PR REVIEW ALL MEDS BY PRESCRIBER/CLIN PHARMACIST DOCUMENTED: ICD-10-PCS | Mod: CPTII,S$GLB,, | Performed by: NURSE PRACTITIONER

## 2023-04-28 PROCEDURE — 3044F PR MOST RECENT HEMOGLOBIN A1C LEVEL <7.0%: ICD-10-PCS | Mod: CPTII,S$GLB,, | Performed by: NURSE PRACTITIONER

## 2023-04-28 PROCEDURE — 3074F SYST BP LT 130 MM HG: CPT | Mod: CPTII,S$GLB,, | Performed by: NURSE PRACTITIONER

## 2023-04-28 PROCEDURE — 93010 EKG 12-LEAD: ICD-10-PCS | Mod: S$GLB,,, | Performed by: INTERNAL MEDICINE

## 2023-04-28 PROCEDURE — 3044F HG A1C LEVEL LT 7.0%: CPT | Mod: CPTII,S$GLB,, | Performed by: NURSE PRACTITIONER

## 2023-04-28 PROCEDURE — 1159F PR MEDICATION LIST DOCUMENTED IN MEDICAL RECORD: ICD-10-PCS | Mod: CPTII,S$GLB,, | Performed by: NURSE PRACTITIONER

## 2023-04-28 PROCEDURE — 99999 PR PBB SHADOW E&M-EST. PATIENT-LVL IV: CPT | Mod: PBBFAC,,, | Performed by: NURSE PRACTITIONER

## 2023-04-28 PROCEDURE — 1159F MED LIST DOCD IN RCRD: CPT | Mod: CPTII,S$GLB,, | Performed by: NURSE PRACTITIONER

## 2023-04-28 PROCEDURE — 85025 COMPLETE CBC W/AUTO DIFF WBC: CPT | Performed by: NURSE PRACTITIONER

## 2023-04-28 PROCEDURE — 3074F PR MOST RECENT SYSTOLIC BLOOD PRESSURE < 130 MM HG: ICD-10-PCS | Mod: CPTII,S$GLB,, | Performed by: NURSE PRACTITIONER

## 2023-04-28 PROCEDURE — 93005 ELECTROCARDIOGRAM TRACING: CPT | Mod: S$GLB,,, | Performed by: NURSE PRACTITIONER

## 2023-04-28 PROCEDURE — 99999 PR PBB SHADOW E&M-EST. PATIENT-LVL IV: ICD-10-PCS | Mod: PBBFAC,,, | Performed by: NURSE PRACTITIONER

## 2023-04-28 PROCEDURE — 3078F PR MOST RECENT DIASTOLIC BLOOD PRESSURE < 80 MM HG: ICD-10-PCS | Mod: CPTII,S$GLB,, | Performed by: NURSE PRACTITIONER

## 2023-04-28 PROCEDURE — 83036 HEMOGLOBIN GLYCOSYLATED A1C: CPT | Performed by: NURSE PRACTITIONER

## 2023-04-28 PROCEDURE — 3008F BODY MASS INDEX DOCD: CPT | Mod: CPTII,S$GLB,, | Performed by: NURSE PRACTITIONER

## 2023-04-28 NOTE — PROGRESS NOTES
Subjective:      Ed Osei is a 42 y.o. male who presents to the office today for a preoperative consultation at the request of surgeon Dr. Davis who plans on performing rotator cuff repair on May 9. This consultation is requested for the specific conditions prompting preoperative evaluation (i.e. because of potential affect on operative risk): none. Planned anesthesia: general. The patient has the following known anesthesia issues:  none . Patients bleeding risk: no recent abnormal bleeding. Patient does not have objections to receiving blood products if needed.    The following portions of the patient's history were reviewed and updated as appropriate: allergies, current medications, past family history, past medical history, past social history, past surgical history, and problem list.    Review of Systems  A comprehensive review of systems was negative.     Past Medical History:   Diagnosis Date    PONV (postoperative nausea and vomiting)        Past Surgical History:   Procedure Laterality Date    ADENOIDECTOMY      SINUS SURGERY      x2    WISDOM TOOTH EXTRACTION  9404-5397= while in High School.       Family History   Problem Relation Age of Onset    No Known Problems Father     Hyperlipidemia Mother     Hypertension Mother     Cataracts Mother     No Known Problems Maternal Aunt     No Known Problems Maternal Uncle     No Known Problems Paternal Aunt     No Known Problems Paternal Uncle     No Known Problems Paternal Grandmother     No Known Problems Paternal Grandfather     No Known Problems Maternal Grandmother     No Known Problems Maternal Grandfather     No Known Problems Sister     Hyperlipidemia Brother     Hypertension Brother     Amblyopia Neg Hx     Blindness Neg Hx     Cancer Neg Hx     Diabetes Neg Hx     Glaucoma Neg Hx     Macular degeneration Neg Hx     Retinal detachment Neg Hx     Strabismus Neg Hx     Stroke Neg Hx     Thyroid disease Neg Hx        Social History     Socioeconomic  "History    Marital status:    Tobacco Use    Smoking status: Never    Smokeless tobacco: Never   Substance and Sexual Activity    Alcohol use: Yes     Alcohol/week: 5.0 standard drinks     Types: 6 Standard drinks or equivalent per week     Comment: occ.    Drug use: No    Sexual activity: Yes     Partners: Female       No current outpatient medications on file.     No current facility-administered medications for this visit.       Review of patient's allergies indicates:  No Known Allergies      Objective:      /74 (BP Location: Right arm, Patient Position: Sitting, BP Method: Large (Manual))   Pulse 67   Temp 98.5 °F (36.9 °C) (Oral)   Ht 5' 6" (1.676 m)   Wt 73.4 kg (161 lb 13.1 oz)   SpO2 97%   BMI 26.12 kg/m²     General Appearance:    Alert, cooperative, no distress, appears stated age   Head:    Normocephalic, without obvious abnormality, atraumatic   Eyes:    PERRL, conjunctiva/corneas clear, EOM's intact, fundi     benign, both eyes        Ears:    Normal TM's and external ear canals, both ears   Nose:   Nares normal, septum midline, mucosa normal, no drainage    or sinus tenderness   Throat:   Lips, mucosa, and tongue normal; teeth and gums normal   Neck:   Supple, symmetrical, trachea midline, no adenopathy;        thyroid:  No enlargement/tenderness/nodules; no carotid    bruit or JVD   Back:     Symmetric, no curvature, ROM normal, no CVA tenderness   Lungs:     Clear to auscultation bilaterally, respirations unlabored   Chest wall:    No tenderness or deformity   Heart:    Regular rate and rhythm, S1 and S2 normal, no murmur, rub   or gallop   Abdomen:     Soft, non-tender, bowel sounds active all four quadrants,     no masses, no organomegaly   Genitalia:    Normal male without lesion, discharge or tenderness   Rectal:    Normal tone, normal prostate, no masses or tenderness;    guaiac negative stool   Extremities:   Extremities normal, atraumatic, no cyanosis or edema   Pulses:   " 2+ and symmetric all extremities   Skin:   Skin color, texture, turgor normal, no rashes or lesions   Lymph nodes:   Cervical, supraclavicular, and axillary nodes normal   Neurologic:   CNII-XII intact. Normal strength, sensation and reflexes       throughout       Predictors of intubation difficulty:   Morbid obesity? no   Anatomically abnormal facies? no   Prominent incisors? no   Receding mandible? no   Short, thick neck? no   Neck range of motion: normal   Mallampati score: II (hard and soft palate, upper portion of tonsils anduvula visible)    Cardiographics  ECG:  sinus bradycardia; septal infarct, age undertermined  Echocardiogram: not done    Imaging  Chest x-ray:  not done      Lab Review   No visits with results within 2 Month(s) from this visit.   Latest known visit with results is:   Clinical Support on 01/31/2023   Component Date Value    POC Cholesterol, Total 01/31/2023 193     POC Cholesterol, HDL 01/31/2023 59     POC Triglycerides 01/31/2023 44     POC Glucose, Fasting 01/31/2023 85          Assessment:        42 y.o. male with planned surgery as above.    Known risk factors for perioperative complications: None    Difficulty with intubation is not anticipated.    Cardiac Risk Estimation: Class I risk         Plan:   Pre-op exam  -     CBC Auto Differential; Future; Expected date: 04/28/2023  -     COMPREHENSIVE METABOLIC PANEL; Future; Expected date: 04/28/2023  -     IN OFFICE EKG 12-LEAD (to Muse)  -     Hemoglobin A1C; Future; Expected date: 04/28/2023    Abnormal EKG  -     Ambulatory referral/consult to Cardiology; Future; Expected date: 05/10/2023    Abnormal EKG, will no CP, SOB, hx of HTN or CAD. Will refer to cardiology to be done after surgery.     Patient is medically optimized for surgery.

## 2023-04-29 LAB
ESTIMATED AVG GLUCOSE: 88 MG/DL (ref 68–131)
HBA1C MFR BLD: 4.7 % (ref 4–5.6)

## 2023-05-02 ENCOUNTER — PATIENT MESSAGE (OUTPATIENT)
Dept: SPORTS MEDICINE | Facility: CLINIC | Age: 43
End: 2023-05-02
Payer: COMMERCIAL

## 2023-05-03 ENCOUNTER — TELEPHONE (OUTPATIENT)
Dept: FAMILY MEDICINE | Facility: CLINIC | Age: 43
End: 2023-05-03
Payer: COMMERCIAL

## 2023-05-03 ENCOUNTER — TELEPHONE (OUTPATIENT)
Dept: ADMINISTRATIVE | Facility: HOSPITAL | Age: 43
End: 2023-05-03
Payer: COMMERCIAL

## 2023-05-03 NOTE — TELEPHONE ENCOUNTER
----- Message from Korin Brower NP sent at 5/3/2023 10:12 AM CDT -----  Regarding: cards referral  Hey can you call the patient and let him know that I cleared him for his surgery Friday. I also placed a referral to cardiology due to his EKG he can follow up with them after his surgery.     Thanks   Korin

## 2023-05-03 NOTE — TELEPHONE ENCOUNTER
Attempted to schedule patient for a Cardiology referral regarding abnormal EKG during preop exam with GAEL. Korin Brower.  Patient declined scheduling at this time/ states that he was told to follow up with a Cardiologist after his surgery and that he will call back to schedule.

## 2023-05-05 ENCOUNTER — OFFICE VISIT (OUTPATIENT)
Dept: SPORTS MEDICINE | Facility: CLINIC | Age: 43
End: 2023-05-05
Payer: COMMERCIAL

## 2023-05-05 VITALS
SYSTOLIC BLOOD PRESSURE: 117 MMHG | WEIGHT: 159 LBS | HEIGHT: 66 IN | BODY MASS INDEX: 25.55 KG/M2 | HEART RATE: 66 BPM | DIASTOLIC BLOOD PRESSURE: 77 MMHG

## 2023-05-05 DIAGNOSIS — M75.102 NON-TRAUMATIC ROTATOR CUFF TEAR, LEFT: Primary | ICD-10-CM

## 2023-05-05 PROCEDURE — 3074F SYST BP LT 130 MM HG: CPT | Mod: CPTII,S$GLB,, | Performed by: PHYSICIAN ASSISTANT

## 2023-05-05 PROCEDURE — 1160F RVW MEDS BY RX/DR IN RCRD: CPT | Mod: CPTII,S$GLB,, | Performed by: PHYSICIAN ASSISTANT

## 2023-05-05 PROCEDURE — 3078F PR MOST RECENT DIASTOLIC BLOOD PRESSURE < 80 MM HG: ICD-10-PCS | Mod: CPTII,S$GLB,, | Performed by: PHYSICIAN ASSISTANT

## 2023-05-05 PROCEDURE — 1159F PR MEDICATION LIST DOCUMENTED IN MEDICAL RECORD: ICD-10-PCS | Mod: CPTII,S$GLB,, | Performed by: PHYSICIAN ASSISTANT

## 2023-05-05 PROCEDURE — 1159F MED LIST DOCD IN RCRD: CPT | Mod: CPTII,S$GLB,, | Performed by: PHYSICIAN ASSISTANT

## 2023-05-05 PROCEDURE — 3078F DIAST BP <80 MM HG: CPT | Mod: CPTII,S$GLB,, | Performed by: PHYSICIAN ASSISTANT

## 2023-05-05 PROCEDURE — 99999 PR PBB SHADOW E&M-EST. PATIENT-LVL IV: ICD-10-PCS | Mod: PBBFAC,,, | Performed by: PHYSICIAN ASSISTANT

## 2023-05-05 PROCEDURE — 3044F HG A1C LEVEL LT 7.0%: CPT | Mod: CPTII,S$GLB,, | Performed by: PHYSICIAN ASSISTANT

## 2023-05-05 PROCEDURE — 99999 PR PBB SHADOW E&M-EST. PATIENT-LVL IV: CPT | Mod: PBBFAC,,, | Performed by: PHYSICIAN ASSISTANT

## 2023-05-05 PROCEDURE — 99499 NO LOS: ICD-10-PCS | Mod: S$GLB,,, | Performed by: PHYSICIAN ASSISTANT

## 2023-05-05 PROCEDURE — 1160F PR REVIEW ALL MEDS BY PRESCRIBER/CLIN PHARMACIST DOCUMENTED: ICD-10-PCS | Mod: CPTII,S$GLB,, | Performed by: PHYSICIAN ASSISTANT

## 2023-05-05 PROCEDURE — 3074F PR MOST RECENT SYSTOLIC BLOOD PRESSURE < 130 MM HG: ICD-10-PCS | Mod: CPTII,S$GLB,, | Performed by: PHYSICIAN ASSISTANT

## 2023-05-05 PROCEDURE — 3008F PR BODY MASS INDEX (BMI) DOCUMENTED: ICD-10-PCS | Mod: CPTII,S$GLB,, | Performed by: PHYSICIAN ASSISTANT

## 2023-05-05 PROCEDURE — 99499 UNLISTED E&M SERVICE: CPT | Mod: S$GLB,,, | Performed by: PHYSICIAN ASSISTANT

## 2023-05-05 PROCEDURE — 3008F BODY MASS INDEX DOCD: CPT | Mod: CPTII,S$GLB,, | Performed by: PHYSICIAN ASSISTANT

## 2023-05-05 PROCEDURE — 3044F PR MOST RECENT HEMOGLOBIN A1C LEVEL <7.0%: ICD-10-PCS | Mod: CPTII,S$GLB,, | Performed by: PHYSICIAN ASSISTANT

## 2023-05-05 RX ORDER — SODIUM CHLORIDE 9 MG/ML
INJECTION, SOLUTION INTRAVENOUS CONTINUOUS
Status: CANCELLED | OUTPATIENT
Start: 2023-05-05

## 2023-05-05 RX ORDER — OXYCODONE HCL 10 MG/1
10 TABLET, FILM COATED, EXTENDED RELEASE ORAL EVERY 12 HOURS
Status: CANCELLED | OUTPATIENT
Start: 2023-05-05

## 2023-05-05 RX ORDER — PROMETHAZINE HYDROCHLORIDE 25 MG/1
25 TABLET ORAL EVERY 6 HOURS PRN
Qty: 21 TABLET | Refills: 0 | Status: SHIPPED | OUTPATIENT
Start: 2023-05-05

## 2023-05-05 RX ORDER — CLINDAMYCIN PHOSPHATE 900 MG/50ML
900 INJECTION, SOLUTION INTRAVENOUS
Status: CANCELLED | OUTPATIENT
Start: 2023-05-05

## 2023-05-05 RX ORDER — PROMETHAZINE HYDROCHLORIDE 25 MG/1
25 TABLET ORAL EVERY 6 HOURS PRN
Qty: 8 TABLET | Refills: 0 | Status: SHIPPED | OUTPATIENT
Start: 2023-05-05 | End: 2023-05-05

## 2023-05-05 RX ORDER — TRAMADOL HYDROCHLORIDE 50 MG/1
50-100 TABLET ORAL EVERY 6 HOURS PRN
Qty: 21 TABLET | Refills: 0 | Status: SHIPPED | OUTPATIENT
Start: 2023-05-05

## 2023-05-05 RX ORDER — PREGABALIN 75 MG/1
75 CAPSULE ORAL
Status: CANCELLED | OUTPATIENT
Start: 2023-05-05 | End: 2023-05-05

## 2023-05-05 RX ORDER — OXYCODONE AND ACETAMINOPHEN 10; 325 MG/1; MG/1
TABLET ORAL
Qty: 21 TABLET | Refills: 0 | Status: SHIPPED | OUTPATIENT
Start: 2023-05-05 | End: 2023-05-24 | Stop reason: SDUPTHER

## 2023-05-05 RX ORDER — ASPIRIN 81 MG/1
81 TABLET ORAL DAILY
Qty: 28 TABLET | Refills: 0 | Status: SHIPPED | OUTPATIENT
Start: 2023-05-05 | End: 2024-05-04

## 2023-05-05 RX ORDER — ASPIRIN 81 MG/1
81 TABLET ORAL DAILY
Qty: 28 TABLET | Refills: 0 | COMMUNITY
Start: 2023-05-05 | End: 2023-05-05

## 2023-05-05 NOTE — H&P
Ed Osei  is here for a completion of his perioperative paperwork. he  Is scheduled to undergo     left              a. Shoulder arthroscopic rotator cuff repair with Cuffmend              b. Shoulder arthroscopic Subscapularis repair (Articular side undersurface)              c. Shoulder arthroscopic SAD              d. Shoulder arthroscopic extensive debridement              e. Shoulder arthroscopic biceps tenodesis (vs. subpect tenodesis)              f. Shoulder arthroscopic possible microfracture              g.  Shoulder open reduction internal fixation greater tuberosity on 5/9/23.      He is a healthy individual and does need clearance for this procedure which she has received from primary care.     PAST MEDICAL HISTORY:   Past Medical History:   Diagnosis Date    PONV (postoperative nausea and vomiting)      PAST SURGICAL HISTORY:   Past Surgical History:   Procedure Laterality Date    ACHILLES TENDON SURGERY  2015    ADENOIDECTOMY      SHOULDER ARTHROSCOPY      SINUS SURGERY      x2    WISDOM TOOTH EXTRACTION  [id number]= while in High School.     FAMILY HISTORY:   Family History   Problem Relation Age of Onset    No Known Problems Father     Hyperlipidemia Mother     Hypertension Mother     Cataracts Mother     No Known Problems Maternal Aunt     No Known Problems Maternal Uncle     No Known Problems Paternal Aunt     No Known Problems Paternal Uncle     No Known Problems Paternal Grandmother     No Known Problems Paternal Grandfather     No Known Problems Maternal Grandmother     No Known Problems Maternal Grandfather     No Known Problems Sister     Hyperlipidemia Brother     Hypertension Brother     Amblyopia Neg Hx     Blindness Neg Hx     Cancer Neg Hx     Diabetes Neg Hx     Glaucoma Neg Hx     Macular degeneration Neg Hx     Retinal detachment Neg Hx     Strabismus Neg Hx     Stroke Neg Hx     Thyroid disease Neg Hx      SOCIAL HISTORY:   Social History     Socioeconomic History     "Marital status:    Tobacco Use    Smoking status: Never    Smokeless tobacco: Never   Substance and Sexual Activity    Alcohol use: Yes     Alcohol/week: 5.0 standard drinks     Types: 6 Standard drinks or equivalent per week     Comment: occ.    Drug use: No    Sexual activity: Yes     Partners: Female       MEDICATIONS: No current outpatient medications on file.  ALLERGIES: Review of patient's allergies indicates:  No Known Allergies    VITAL SIGNS: /77   Pulse 66   Ht 5' 6" (1.676 m)   Wt 72.1 kg (159 lb)   BMI 25.66 kg/m²      Risks, indications and benefits of the surgical procedure were discussed with the patient. All questions with regard to surgery, rehab, expected return to functional activities, activities of daily living and recreational endeavors were answered to his satisfaction.    It was explained to the patient that there may be an increase in surgical risks if the patient has certain co-morbidities such as but not limited to: Obesity, Cardiovascular issues (CHF, CAD, Arrhythmias), chronic pulmonary issues, previous or current neurovascular/neurological issues, previous strokes, diabetes mellitus, previous wound healing issues, previous wound or skin infections, PVD, clotting disorders, if the patient uses chronic steroids, if the patient takes or has immune compromising medications or diseases, or has previously or currently used tobacco products.     The patient verbalized that he/she does not have any additional clotting, bleeding, or blood disorders, other than what is list in her chart on today's review.     Then a brief history and physical exam were performed.    Review of Systems   Constitution: Negative. Negative for chills, fever and night sweats.   HENT: Negative for congestion and headaches.    Eyes: Negative for blurred vision, left vision loss and right vision loss.   Cardiovascular: Negative for chest pain and syncope.   Respiratory: Negative for cough and shortness of " breath.    Endocrine: Negative for polydipsia, polyphagia and polyuria.   Hematologic/Lymphatic: Negative for bleeding problem. Does not bruise/bleed easily.   Skin: Negative for dry skin, itching and rash.   Musculoskeletal: Negative for falls and muscle weakness.   Gastrointestinal: Negative for abdominal pain and bowel incontinence.   Genitourinary: Negative for bladder incontinence and nocturia.   Neurological: Negative for disturbances in coordination, loss of balance and seizures.   Psychiatric/Behavioral: Negative for depression. The patient does not have insomnia.    Allergic/Immunologic: Negative for hives and persistent infections.     PHYSICAL EXAM:  GEN: A&Ox3, WD WN NAD  HEENT: WNL  CHEST: CTAB, no W/R/R  HEART: RRR, no M/R/G  ABD: Soft, NT ND, BS x4 QUADS  MS; See Epic  NEURO: CN II-XII intact       The surgical consent was then reviewed with the patient, who agreed with all the contents of the consent form and it was signed. he was then given the surgery packet to bring with him to surgery center for the anesthesia portion of his perioperative paperwork (if needed)   For all physicians except for Dr. Valdez, we will email and possibly fax the consent forms and booking sheets to [facility].    The patient was given the opportunity to ask questions about the surgical plan and consent form, and once no other questions were asked, I proceeded with the pre-op appointment.    PHYSICAL THERAPY:  He was also instructed regarding physical therapy and will begin on  10-14 days post-op. He was given a copy of the original prescription to schedule. Another copy of this prescription was also faxed to [facility].    POST OP CARE:instructions were reviewed including care of the wound and dressing after surgery and when he can shower. Patient was told not sleep or lay on there surgical extremity following surgery as this could cause repair damage, tissue damage, or nerve injury.    An extensive amount  of time was spent on discussion of the following information based on what type of surgery the patient was having. Patient expressed understanding of the material below:    Shoulder surgery or upper extremity surgery requiring post-op sling:  It was explained to the patient that they should remove their arm from the sling approximately 6 times per day to do full elbow ROM (flexion and extension) and full supination and pronation of the elbow for approximately 5 minutes at a time to help prevent elbow stiffness, nerve pain or problems, or nerve injury. They were told to contact us if they begin having numbness and tingling of there surgical extremity that persists longer then 1 day without relief.     Extremity surgery requiring a splint:   It was explained to patient on how to properly elevated position there extremity to prevent pressure ulcers from occurring. I made sure that the patient understood that that surgical site may be numb following surgery and prevent them from feeling pressure pain that they would normal feel if a pressure injury was occurring. Pressure ulcers and there causes were discussed with the patient today.     Post-operative splint:  It was explain to the patient that they can contact us at anytime if they feel that there is a problem with their splint or under their splint that needs evaluation. If there is concern, questions, or discomfort with the splint then they can present to either our clinic or the [facility] ED for removal, evaluation, and replacement of the splint.    CRUTCHES OR WALKER: It was explained to the patient that if they are having a lower extremity surgery that they will require either a walker or crutches to ambulate safely with after surgery. It was explained that a cane or other assistive devices are not sufficient to safely ambulate with after surgery. I explained to the patient that I will place an order for them to receive either crutches or a walker after  surgery to go home with. It was explained that if they have crutches or a walker at home already, that they are REQUIRED to bring them to the hospital on the day of surgery. It was explained that if they do not have them at the hospital on the day of surgery that they WILL be provided a new pair or crutches or a walker to go home with to ensure ambulation will be safe if the patient needs to stop somewhere on the way home.      PAIN MANAGEMENT: Ed Osei was also given a pain management regime, which includes the option of getting a TENS unit given to him by [facility] (if patient chooses) along with the education required for its use. He was also instructed regarding the Polar ice unit or gel ice packs (chosen by patient) that will be in place after surgery and his postoperative pain medications.     Patient understands that Polar Ice machine has to be bought today if they want it. It cannot be bought on day of surgery at surgery center.     PAIN MEDICATION:  Percocet 10/325mg 1 po q 4-6 hours prn pain  Ultram 50 mg Take 1-2 p.o. q.6 hours p.r.n. breakthrough pain,   Phenergan 25 mg one p.o. q.6 hours p.r.n. nausea and vomiting.    DVT prophylaxis was discussed with the patient today including risk factors for developing DVTs and history of DVTs. The patient was asked if any specific recommendations were given from the doctor/s that did pre-operative surgical clearance. The patient was then given an education sheet about DVTs and PE with warning signs and symptoms of both and steps to take if they suspect either of these.    This along with the Modified Caprini risk assessment model for VTE in general surgical patients was used to determine the patient's DVT risk.     From: Danyelle HANSEN, Bartolo DA, Aileen REILLY, et al. Prevention of VTE in nonorthopedic surgical patients: antithrombotic therapy and prevention of thrombosis, 9th ed: American [employer] evidence-based clinical practical guidelines.  Chest 2012; 141:e227S. Copyright © 2012. Reproduced with permission from the American College of Chest [employer]icians.    The below listed DVT prophylaxis regimen along with bilateral TANVIR compression stockings will be used post-op. Length of treatment has been determined to be 10-42 days post-op by the above noted Caprini assessment model.     The patient was instructed to buy and take:  Aspirin 81mg QD x 4 weeks for DVT prophylaxis starting on the morning after surgery.  Patient will also use bilateral TEDs on lower extremities, SCDs during surgery, and early ambulation post-op. If the patient was previously taking 81mg baby aspirin, they were told to not take it starting 5 days prior to surgery and to restart the 81mg aspirin after surgery.       Patient was also told to buy over the counter Prilosec medication if needed and take it once daily for GI protection as long as they are taking NSAIDs or Aspirin.   Patient denies history of seizures.      The patient was told that narcotic pain medications may make them drowsy and instructions were given to not sign legal documents, drive or operate heavy machinery, cars, or equipment while under the influence of narcotic medications. The patient was told and understands that narcotic pain medications should only be used as needed to control pain and that other options of pain control include TENs unit and ice packs/unit.     As there were no other questions to be asked, he was given my business card along with Tori Davis MD business card if he has any questions or concerns prior to surgery or in the postop period.

## 2023-05-05 NOTE — H&P (VIEW-ONLY)
Ed Osei  is here for a completion of his perioperative paperwork. he  Is scheduled to undergo     left              a. Shoulder arthroscopic rotator cuff repair with Cuffmend              b. Shoulder arthroscopic Subscapularis repair (Articular side undersurface)              c. Shoulder arthroscopic SAD              d. Shoulder arthroscopic extensive debridement              e. Shoulder arthroscopic biceps tenodesis (vs. subpect tenodesis)              f. Shoulder arthroscopic possible microfracture              g.  Shoulder open reduction internal fixation greater tuberosity on 5/9/23.      He is a healthy individual and does need clearance for this procedure which she has received from primary care.     PAST MEDICAL HISTORY:   Past Medical History:   Diagnosis Date    PONV (postoperative nausea and vomiting)      PAST SURGICAL HISTORY:   Past Surgical History:   Procedure Laterality Date    ACHILLES TENDON SURGERY  2015    ADENOIDECTOMY      SHOULDER ARTHROSCOPY      SINUS SURGERY      x2    WISDOM TOOTH EXTRACTION  [id number]= while in High School.     FAMILY HISTORY:   Family History   Problem Relation Age of Onset    No Known Problems Father     Hyperlipidemia Mother     Hypertension Mother     Cataracts Mother     No Known Problems Maternal Aunt     No Known Problems Maternal Uncle     No Known Problems Paternal Aunt     No Known Problems Paternal Uncle     No Known Problems Paternal Grandmother     No Known Problems Paternal Grandfather     No Known Problems Maternal Grandmother     No Known Problems Maternal Grandfather     No Known Problems Sister     Hyperlipidemia Brother     Hypertension Brother     Amblyopia Neg Hx     Blindness Neg Hx     Cancer Neg Hx     Diabetes Neg Hx     Glaucoma Neg Hx     Macular degeneration Neg Hx     Retinal detachment Neg Hx     Strabismus Neg Hx     Stroke Neg Hx     Thyroid disease Neg Hx      SOCIAL HISTORY:   Social History     Socioeconomic History     "Marital status:    Tobacco Use    Smoking status: Never    Smokeless tobacco: Never   Substance and Sexual Activity    Alcohol use: Yes     Alcohol/week: 5.0 standard drinks     Types: 6 Standard drinks or equivalent per week     Comment: occ.    Drug use: No    Sexual activity: Yes     Partners: Female       MEDICATIONS: No current outpatient medications on file.  ALLERGIES: Review of patient's allergies indicates:  No Known Allergies    VITAL SIGNS: /77   Pulse 66   Ht 5' 6" (1.676 m)   Wt 72.1 kg (159 lb)   BMI 25.66 kg/m²      Risks, indications and benefits of the surgical procedure were discussed with the patient. All questions with regard to surgery, rehab, expected return to functional activities, activities of daily living and recreational endeavors were answered to his satisfaction.    It was explained to the patient that there may be an increase in surgical risks if the patient has certain co-morbidities such as but not limited to: Obesity, Cardiovascular issues (CHF, CAD, Arrhythmias), chronic pulmonary issues, previous or current neurovascular/neurological issues, previous strokes, diabetes mellitus, previous wound healing issues, previous wound or skin infections, PVD, clotting disorders, if the patient uses chronic steroids, if the patient takes or has immune compromising medications or diseases, or has previously or currently used tobacco products.     The patient verbalized that he/she does not have any additional clotting, bleeding, or blood disorders, other than what is list in her chart on today's review.     Then a brief history and physical exam were performed.    Review of Systems   Constitution: Negative. Negative for chills, fever and night sweats.   HENT: Negative for congestion and headaches.    Eyes: Negative for blurred vision, left vision loss and right vision loss.   Cardiovascular: Negative for chest pain and syncope.   Respiratory: Negative for cough and shortness of " breath.    Endocrine: Negative for polydipsia, polyphagia and polyuria.   Hematologic/Lymphatic: Negative for bleeding problem. Does not bruise/bleed easily.   Skin: Negative for dry skin, itching and rash.   Musculoskeletal: Negative for falls and muscle weakness.   Gastrointestinal: Negative for abdominal pain and bowel incontinence.   Genitourinary: Negative for bladder incontinence and nocturia.   Neurological: Negative for disturbances in coordination, loss of balance and seizures.   Psychiatric/Behavioral: Negative for depression. The patient does not have insomnia.    Allergic/Immunologic: Negative for hives and persistent infections.     PHYSICAL EXAM:  GEN: A&Ox3, WD WN NAD  HEENT: WNL  CHEST: CTAB, no W/R/R  HEART: RRR, no M/R/G  ABD: Soft, NT ND, BS x4 QUADS  MS; See Epic  NEURO: CN II-XII intact       The surgical consent was then reviewed with the patient, who agreed with all the contents of the consent form and it was signed. he was then given the surgery packet to bring with him to surgery center for the anesthesia portion of his perioperative paperwork (if needed)   For all physicians except for Dr. Valdez, we will email and possibly fax the consent forms and booking sheets to [facility].    The patient was given the opportunity to ask questions about the surgical plan and consent form, and once no other questions were asked, I proceeded with the pre-op appointment.    PHYSICAL THERAPY:  He was also instructed regarding physical therapy and will begin on  10-14 days post-op. He was given a copy of the original prescription to schedule. Another copy of this prescription was also faxed to [facility].    POST OP CARE:instructions were reviewed including care of the wound and dressing after surgery and when he can shower. Patient was told not sleep or lay on there surgical extremity following surgery as this could cause repair damage, tissue damage, or nerve injury.    An extensive amount  of time was spent on discussion of the following information based on what type of surgery the patient was having. Patient expressed understanding of the material below:    Shoulder surgery or upper extremity surgery requiring post-op sling:  It was explained to the patient that they should remove their arm from the sling approximately 6 times per day to do full elbow ROM (flexion and extension) and full supination and pronation of the elbow for approximately 5 minutes at a time to help prevent elbow stiffness, nerve pain or problems, or nerve injury. They were told to contact us if they begin having numbness and tingling of there surgical extremity that persists longer then 1 day without relief.     Extremity surgery requiring a splint:   It was explained to patient on how to properly elevated position there extremity to prevent pressure ulcers from occurring. I made sure that the patient understood that that surgical site may be numb following surgery and prevent them from feeling pressure pain that they would normal feel if a pressure injury was occurring. Pressure ulcers and there causes were discussed with the patient today.     Post-operative splint:  It was explain to the patient that they can contact us at anytime if they feel that there is a problem with their splint or under their splint that needs evaluation. If there is concern, questions, or discomfort with the splint then they can present to either our clinic or the [facility] ED for removal, evaluation, and replacement of the splint.    CRUTCHES OR WALKER: It was explained to the patient that if they are having a lower extremity surgery that they will require either a walker or crutches to ambulate safely with after surgery. It was explained that a cane or other assistive devices are not sufficient to safely ambulate with after surgery. I explained to the patient that I will place an order for them to receive either crutches or a walker after  surgery to go home with. It was explained that if they have crutches or a walker at home already, that they are REQUIRED to bring them to the hospital on the day of surgery. It was explained that if they do not have them at the hospital on the day of surgery that they WILL be provided a new pair or crutches or a walker to go home with to ensure ambulation will be safe if the patient needs to stop somewhere on the way home.      PAIN MANAGEMENT: Ed Osei was also given a pain management regime, which includes the option of getting a TENS unit given to him by [facility] (if patient chooses) along with the education required for its use. He was also instructed regarding the Polar ice unit or gel ice packs (chosen by patient) that will be in place after surgery and his postoperative pain medications.     Patient understands that Polar Ice machine has to be bought today if they want it. It cannot be bought on day of surgery at surgery center.     PAIN MEDICATION:  Percocet 10/325mg 1 po q 4-6 hours prn pain  Ultram 50 mg Take 1-2 p.o. q.6 hours p.r.n. breakthrough pain,   Phenergan 25 mg one p.o. q.6 hours p.r.n. nausea and vomiting.    DVT prophylaxis was discussed with the patient today including risk factors for developing DVTs and history of DVTs. The patient was asked if any specific recommendations were given from the doctor/s that did pre-operative surgical clearance. The patient was then given an education sheet about DVTs and PE with warning signs and symptoms of both and steps to take if they suspect either of these.    This along with the Modified Caprini risk assessment model for VTE in general surgical patients was used to determine the patient's DVT risk.     From: Danyelle HANSEN, Bartolo DA, Aileen REILLY, et al. Prevention of VTE in nonorthopedic surgical patients: antithrombotic therapy and prevention of thrombosis, 9th ed: American College [employer] evidence-based clinical practical guidelines.  Chest 2012; 141:e227S. Copyright © 2012. Reproduced with permission from the American College [employer]icians.    The below listed DVT prophylaxis regimen along with bilateral TANVIR compression stockings will be used post-op. Length of treatment has been determined to be 10-42 days post-op by the above noted Caprini assessment model.     The patient was instructed to buy and take:  Aspirin 81mg QD x 4 weeks for DVT prophylaxis starting on the morning after surgery.  Patient will also use bilateral TEDs on lower extremities, SCDs during surgery, and early ambulation post-op. If the patient was previously taking 81mg baby aspirin, they were told to not take it starting 5 days prior to surgery and to restart the 81mg aspirin after surgery.       Patient was also told to buy over the counter Prilosec medication if needed and take it once daily for GI protection as long as they are taking NSAIDs or Aspirin.   Patient denies history of seizures.      The patient was told that narcotic pain medications may make them drowsy and instructions were given to not sign legal documents, drive or operate heavy machinery, cars, or equipment while under the influence of narcotic medications. The patient was told and understands that narcotic pain medications should only be used as needed to control pain and that other options of pain control include TENs unit and ice packs/unit.     As there were no other questions to be asked, he was given my business card along with Tori Davis MD business card if he has any questions or concerns prior to surgery or in the postop period.

## 2023-05-08 ENCOUNTER — PATIENT MESSAGE (OUTPATIENT)
Dept: SPORTS MEDICINE | Facility: CLINIC | Age: 43
End: 2023-05-08
Payer: COMMERCIAL

## 2023-05-08 ENCOUNTER — TELEPHONE (OUTPATIENT)
Dept: SPORTS MEDICINE | Facility: CLINIC | Age: 43
End: 2023-05-08
Payer: COMMERCIAL

## 2023-05-08 ENCOUNTER — ANESTHESIA EVENT (OUTPATIENT)
Dept: SURGERY | Facility: HOSPITAL | Age: 43
End: 2023-05-08
Payer: COMMERCIAL

## 2023-05-08 NOTE — TELEPHONE ENCOUNTER
Spoke to patient regarding surgery arrival time tomorrow. Patient expressed understanding and had all questions answered.

## 2023-05-09 ENCOUNTER — ANESTHESIA (OUTPATIENT)
Dept: SURGERY | Facility: HOSPITAL | Age: 43
End: 2023-05-09
Payer: COMMERCIAL

## 2023-05-09 ENCOUNTER — HOSPITAL ENCOUNTER (OUTPATIENT)
Facility: HOSPITAL | Age: 43
Discharge: HOME | End: 2023-05-09
Attending: ORTHOPAEDIC SURGERY | Admitting: ORTHOPAEDIC SURGERY
Payer: COMMERCIAL

## 2023-05-09 VITALS
RESPIRATION RATE: 17 BRPM | HEIGHT: 66 IN | DIASTOLIC BLOOD PRESSURE: 81 MMHG | OXYGEN SATURATION: 98 % | TEMPERATURE: 98 F | HEART RATE: 74 BPM | WEIGHT: 160 LBS | SYSTOLIC BLOOD PRESSURE: 130 MMHG | BODY MASS INDEX: 25.71 KG/M2

## 2023-05-09 DIAGNOSIS — M24.111 LABRAL TEAR OF SHOULDER, DEGENERATIVE, RIGHT: Primary | ICD-10-CM

## 2023-05-09 DIAGNOSIS — M75.102 NON-TRAUMATIC ROTATOR CUFF TEAR, LEFT: ICD-10-CM

## 2023-05-09 PROCEDURE — 71000039 HC RECOVERY, EACH ADD'L HOUR: Performed by: ORTHOPAEDIC SURGERY

## 2023-05-09 PROCEDURE — 29828 SHO ARTHRS SRG BICP TENODSIS: CPT | Mod: 51,LT,, | Performed by: ORTHOPAEDIC SURGERY

## 2023-05-09 PROCEDURE — 63600175 PHARM REV CODE 636 W HCPCS: Performed by: ORTHOPAEDIC SURGERY

## 2023-05-09 PROCEDURE — C1713 ANCHOR/SCREW BN/BN,TIS/BN: HCPCS | Performed by: ORTHOPAEDIC SURGERY

## 2023-05-09 PROCEDURE — 99900035 HC TECH TIME PER 15 MIN (STAT)

## 2023-05-09 PROCEDURE — 25000003 PHARM REV CODE 250: Performed by: NURSE ANESTHETIST, CERTIFIED REGISTERED

## 2023-05-09 PROCEDURE — 25000003 PHARM REV CODE 250: Performed by: PHYSICIAN ASSISTANT

## 2023-05-09 PROCEDURE — 29823 PR SHLDR ARTHROSCOP,EXTEN DEBRIDE: ICD-10-PCS | Mod: 51,LT,, | Performed by: ORTHOPAEDIC SURGERY

## 2023-05-09 PROCEDURE — 27201423 OPTIME MED/SURG SUP & DEVICES STERILE SUPPLY: Performed by: ORTHOPAEDIC SURGERY

## 2023-05-09 PROCEDURE — 36000710: Performed by: ORTHOPAEDIC SURGERY

## 2023-05-09 PROCEDURE — 25000003 PHARM REV CODE 250: Performed by: SURGERY

## 2023-05-09 PROCEDURE — 37000009 HC ANESTHESIA EA ADD 15 MINS: Performed by: ORTHOPAEDIC SURGERY

## 2023-05-09 PROCEDURE — D9220A PRA ANESTHESIA: ICD-10-PCS | Mod: CRNA,,, | Performed by: NURSE ANESTHETIST, CERTIFIED REGISTERED

## 2023-05-09 PROCEDURE — 29827 SHO ARTHRS SRG RT8TR CUF RPR: CPT | Mod: LT,,, | Performed by: ORTHOPAEDIC SURGERY

## 2023-05-09 PROCEDURE — C1889 IMPLANT/INSERT DEVICE, NOC: HCPCS | Performed by: ORTHOPAEDIC SURGERY

## 2023-05-09 PROCEDURE — 25000003 PHARM REV CODE 250: Performed by: ORTHOPAEDIC SURGERY

## 2023-05-09 PROCEDURE — 71000033 HC RECOVERY, INTIAL HOUR: Performed by: ORTHOPAEDIC SURGERY

## 2023-05-09 PROCEDURE — 63600175 PHARM REV CODE 636 W HCPCS: Performed by: NURSE ANESTHETIST, CERTIFIED REGISTERED

## 2023-05-09 PROCEDURE — D9220A PRA ANESTHESIA: Mod: ANES,,, | Performed by: SURGERY

## 2023-05-09 PROCEDURE — D9220A PRA ANESTHESIA: Mod: CRNA,,, | Performed by: NURSE ANESTHETIST, CERTIFIED REGISTERED

## 2023-05-09 PROCEDURE — 37000008 HC ANESTHESIA 1ST 15 MINUTES: Performed by: ORTHOPAEDIC SURGERY

## 2023-05-09 PROCEDURE — 29823 SHO ARTHRS SRG XTNSV DBRDMT: CPT | Mod: 51,LT,, | Performed by: ORTHOPAEDIC SURGERY

## 2023-05-09 PROCEDURE — 29828 PR ARTHROSCOPY SHOULDER SURGICAL BICEPS TENODESIS: ICD-10-PCS | Mod: 51,LT,, | Performed by: ORTHOPAEDIC SURGERY

## 2023-05-09 PROCEDURE — 71000015 HC POSTOP RECOV 1ST HR: Performed by: ORTHOPAEDIC SURGERY

## 2023-05-09 PROCEDURE — 94761 N-INVAS EAR/PLS OXIMETRY MLT: CPT

## 2023-05-09 PROCEDURE — 36000711: Performed by: ORTHOPAEDIC SURGERY

## 2023-05-09 PROCEDURE — 29827 PR SHLDR ARTHROSCOP,SURG,W/ROTAT CUFF REPR: ICD-10-PCS | Mod: LT,,, | Performed by: ORTHOPAEDIC SURGERY

## 2023-05-09 PROCEDURE — D9220A PRA ANESTHESIA: ICD-10-PCS | Mod: ANES,,, | Performed by: SURGERY

## 2023-05-09 DEVICE — IMPLANTABLE DEVICE: Type: IMPLANTABLE DEVICE | Site: SHOULDER | Status: FUNCTIONAL

## 2023-05-09 DEVICE — ANCHOR BIO-PUSHLOCK 3.5X19.5MM: Type: IMPLANTABLE DEVICE | Site: SHOULDER | Status: FUNCTIONAL

## 2023-05-09 DEVICE — ANCHOR SWIVELOCK KNTLS BLUE #2: Type: IMPLANTABLE DEVICE | Site: SHOULDER | Status: FUNCTIONAL

## 2023-05-09 DEVICE — PATCH ARTHROFLEX 1.5X20X25MM: Type: IMPLANTABLE DEVICE | Site: SHOULDER | Status: FUNCTIONAL

## 2023-05-09 RX ORDER — LABETALOL HYDROCHLORIDE 5 MG/ML
INJECTION, SOLUTION INTRAVENOUS
Status: DISCONTINUED | OUTPATIENT
Start: 2023-05-09 | End: 2023-05-09

## 2023-05-09 RX ORDER — EPINEPHRINE 1 MG/ML
INJECTION, SOLUTION, CONCENTRATE INTRAVENOUS
Status: DISCONTINUED | OUTPATIENT
Start: 2023-05-09 | End: 2023-05-09 | Stop reason: HOSPADM

## 2023-05-09 RX ORDER — ROCURONIUM BROMIDE 10 MG/ML
INJECTION, SOLUTION INTRAVENOUS
Status: DISCONTINUED | OUTPATIENT
Start: 2023-05-09 | End: 2023-05-09

## 2023-05-09 RX ORDER — FAMOTIDINE 10 MG/ML
INJECTION INTRAVENOUS
Status: DISCONTINUED | OUTPATIENT
Start: 2023-05-09 | End: 2023-05-09

## 2023-05-09 RX ORDER — CLINDAMYCIN PHOSPHATE 900 MG/50ML
900 INJECTION, SOLUTION INTRAVENOUS
Status: COMPLETED | OUTPATIENT
Start: 2023-05-09 | End: 2023-05-09

## 2023-05-09 RX ORDER — PROMETHAZINE HYDROCHLORIDE 25 MG/1
25 TABLET ORAL EVERY 6 HOURS PRN
Status: DISCONTINUED | OUTPATIENT
Start: 2023-05-09 | End: 2023-05-09 | Stop reason: HOSPADM

## 2023-05-09 RX ORDER — SODIUM CHLORIDE 0.9 % (FLUSH) 0.9 %
10 SYRINGE (ML) INJECTION
Status: DISCONTINUED | OUTPATIENT
Start: 2023-05-09 | End: 2023-05-09 | Stop reason: HOSPADM

## 2023-05-09 RX ORDER — OXYCODONE HYDROCHLORIDE 5 MG/1
5 TABLET ORAL
Status: DISCONTINUED | OUTPATIENT
Start: 2023-05-09 | End: 2023-05-09 | Stop reason: HOSPADM

## 2023-05-09 RX ORDER — MIDAZOLAM HYDROCHLORIDE 1 MG/ML
INJECTION INTRAMUSCULAR; INTRAVENOUS
Status: DISCONTINUED | OUTPATIENT
Start: 2023-05-09 | End: 2023-05-09

## 2023-05-09 RX ORDER — SCOLOPAMINE TRANSDERMAL SYSTEM 1 MG/1
1 PATCH, EXTENDED RELEASE TRANSDERMAL
Status: DISCONTINUED | OUTPATIENT
Start: 2023-05-09 | End: 2023-05-09 | Stop reason: HOSPADM

## 2023-05-09 RX ORDER — NEOSTIGMINE METHYLSULFATE 1 MG/ML
INJECTION, SOLUTION INTRAVENOUS
Status: DISCONTINUED | OUTPATIENT
Start: 2023-05-09 | End: 2023-05-09

## 2023-05-09 RX ORDER — HALOPERIDOL 5 MG/ML
0.5 INJECTION INTRAMUSCULAR EVERY 10 MIN PRN
Status: DISCONTINUED | OUTPATIENT
Start: 2023-05-09 | End: 2023-05-09 | Stop reason: HOSPADM

## 2023-05-09 RX ORDER — ONDANSETRON HYDROCHLORIDE 2 MG/ML
INJECTION, SOLUTION INTRAMUSCULAR; INTRAVENOUS
Status: DISCONTINUED | OUTPATIENT
Start: 2023-05-09 | End: 2023-05-09

## 2023-05-09 RX ORDER — KETAMINE HCL IN 0.9 % NACL 50 MG/5 ML
SYRINGE (ML) INTRAVENOUS
Status: DISCONTINUED | OUTPATIENT
Start: 2023-05-09 | End: 2023-05-09

## 2023-05-09 RX ORDER — FINASTERIDE 5 MG/1
5 TABLET, FILM COATED ORAL
COMMUNITY
Start: 2022-11-20

## 2023-05-09 RX ORDER — KETAMINE HYDROCHLORIDE 100 MG/ML
INJECTION, SOLUTION INTRAMUSCULAR; INTRAVENOUS
Status: DISCONTINUED | OUTPATIENT
Start: 2023-05-09 | End: 2023-05-09 | Stop reason: HOSPADM

## 2023-05-09 RX ORDER — PREGABALIN 75 MG/1
75 CAPSULE ORAL ONCE
Status: COMPLETED | OUTPATIENT
Start: 2023-05-09 | End: 2023-05-09

## 2023-05-09 RX ORDER — ONDANSETRON 2 MG/ML
4 INJECTION INTRAMUSCULAR; INTRAVENOUS EVERY 12 HOURS PRN
Status: DISCONTINUED | OUTPATIENT
Start: 2023-05-09 | End: 2023-05-09 | Stop reason: HOSPADM

## 2023-05-09 RX ORDER — DUTASTERIDE 0.5 MG/1
CAPSULE, LIQUID FILLED ORAL
COMMUNITY
Start: 2021-09-01

## 2023-05-09 RX ORDER — MORPHINE SULFATE 2 MG/ML
2 INJECTION, SOLUTION INTRAMUSCULAR; INTRAVENOUS EVERY 10 MIN PRN
Status: DISCONTINUED | OUTPATIENT
Start: 2023-05-09 | End: 2023-05-09 | Stop reason: HOSPADM

## 2023-05-09 RX ORDER — PREGABALIN 75 MG/1
75 CAPSULE ORAL
Status: COMPLETED | OUTPATIENT
Start: 2023-05-09 | End: 2023-05-09

## 2023-05-09 RX ORDER — PROPOFOL 10 MG/ML
VIAL (ML) INTRAVENOUS CONTINUOUS PRN
Status: DISCONTINUED | OUTPATIENT
Start: 2023-05-09 | End: 2023-05-09

## 2023-05-09 RX ORDER — PROPOFOL 10 MG/ML
VIAL (ML) INTRAVENOUS
Status: DISCONTINUED | OUTPATIENT
Start: 2023-05-09 | End: 2023-05-09

## 2023-05-09 RX ORDER — DEXAMETHASONE SODIUM PHOSPHATE 4 MG/ML
INJECTION, SOLUTION INTRA-ARTICULAR; INTRALESIONAL; INTRAMUSCULAR; INTRAVENOUS; SOFT TISSUE
Status: DISCONTINUED | OUTPATIENT
Start: 2023-05-09 | End: 2023-05-09

## 2023-05-09 RX ORDER — LIDOCAINE HYDROCHLORIDE 20 MG/ML
INJECTION INTRAVENOUS
Status: DISCONTINUED | OUTPATIENT
Start: 2023-05-09 | End: 2023-05-09

## 2023-05-09 RX ORDER — CARBOXYMETHYLCELLULOSE SODIUM 10 MG/ML
GEL OPHTHALMIC
Status: DISCONTINUED | OUTPATIENT
Start: 2023-05-09 | End: 2023-05-09

## 2023-05-09 RX ORDER — ROPIVACAINE HYDROCHLORIDE 5 MG/ML
INJECTION, SOLUTION EPIDURAL; INFILTRATION; PERINEURAL
Status: DISCONTINUED | OUTPATIENT
Start: 2023-05-09 | End: 2023-05-09 | Stop reason: HOSPADM

## 2023-05-09 RX ORDER — OXYCODONE HYDROCHLORIDE 5 MG/1
10 TABLET ORAL EVERY 4 HOURS PRN
Status: DISCONTINUED | OUTPATIENT
Start: 2023-05-09 | End: 2023-05-09 | Stop reason: HOSPADM

## 2023-05-09 RX ORDER — TRAMADOL HYDROCHLORIDE 50 MG/1
100 TABLET ORAL EVERY 6 HOURS PRN
Status: DISCONTINUED | OUTPATIENT
Start: 2023-05-09 | End: 2023-05-09 | Stop reason: HOSPADM

## 2023-05-09 RX ORDER — METHOCARBAMOL 500 MG/1
1000 TABLET, FILM COATED ORAL ONCE
Status: COMPLETED | OUTPATIENT
Start: 2023-05-09 | End: 2023-05-09

## 2023-05-09 RX ORDER — SODIUM CHLORIDE 9 MG/ML
INJECTION, SOLUTION INTRAVENOUS CONTINUOUS
Status: DISCONTINUED | OUTPATIENT
Start: 2023-05-09 | End: 2023-05-09 | Stop reason: HOSPADM

## 2023-05-09 RX ORDER — KETOROLAC TROMETHAMINE 30 MG/ML
INJECTION, SOLUTION INTRAMUSCULAR; INTRAVENOUS
Status: DISCONTINUED | OUTPATIENT
Start: 2023-05-09 | End: 2023-05-09 | Stop reason: HOSPADM

## 2023-05-09 RX ORDER — OXYCODONE HCL 10 MG/1
10 TABLET, FILM COATED, EXTENDED RELEASE ORAL EVERY 12 HOURS
Status: DISCONTINUED | OUTPATIENT
Start: 2023-05-09 | End: 2023-05-09 | Stop reason: HOSPADM

## 2023-05-09 RX ORDER — HYDROMORPHONE HYDROCHLORIDE 1 MG/ML
0.2 INJECTION, SOLUTION INTRAMUSCULAR; INTRAVENOUS; SUBCUTANEOUS EVERY 5 MIN PRN
Status: DISCONTINUED | OUTPATIENT
Start: 2023-05-09 | End: 2023-05-09 | Stop reason: HOSPADM

## 2023-05-09 RX ORDER — FENTANYL CITRATE 50 UG/ML
INJECTION, SOLUTION INTRAMUSCULAR; INTRAVENOUS
Status: DISCONTINUED | OUTPATIENT
Start: 2023-05-09 | End: 2023-05-09

## 2023-05-09 RX ADMIN — PREGABALIN 75 MG: 75 CAPSULE ORAL at 07:05

## 2023-05-09 RX ADMIN — LABETALOL HYDROCHLORIDE 5 MG: 5 INJECTION, SOLUTION INTRAVENOUS at 10:05

## 2023-05-09 RX ADMIN — FENTANYL CITRATE 100 MCG: 0.05 INJECTION, SOLUTION INTRAMUSCULAR; INTRAVENOUS at 08:05

## 2023-05-09 RX ADMIN — PREGABALIN 75 MG: 75 CAPSULE ORAL at 02:05

## 2023-05-09 RX ADMIN — OXYCODONE HYDROCHLORIDE 5 MG: 5 TABLET ORAL at 02:05

## 2023-05-09 RX ADMIN — ONDANSETRON 4 MG: 2 INJECTION INTRAMUSCULAR; INTRAVENOUS at 11:05

## 2023-05-09 RX ADMIN — ROCURONIUM BROMIDE 50 MG: 10 INJECTION, SOLUTION INTRAVENOUS at 08:05

## 2023-05-09 RX ADMIN — FENTANYL CITRATE 50 MCG: 0.05 INJECTION, SOLUTION INTRAMUSCULAR; INTRAVENOUS at 10:05

## 2023-05-09 RX ADMIN — OXYCODONE HYDROCHLORIDE 10 MG: 10 TABLET, FILM COATED, EXTENDED RELEASE ORAL at 07:05

## 2023-05-09 RX ADMIN — MIDAZOLAM HYDROCHLORIDE 2 MG: 1 INJECTION, SOLUTION INTRAMUSCULAR; INTRAVENOUS at 08:05

## 2023-05-09 RX ADMIN — DEXAMETHASONE SODIUM PHOSPHATE 8 MG: 4 INJECTION, SOLUTION INTRAMUSCULAR; INTRAVENOUS at 08:05

## 2023-05-09 RX ADMIN — SODIUM CHLORIDE: 0.9 INJECTION, SOLUTION INTRAVENOUS at 07:05

## 2023-05-09 RX ADMIN — GLYCOPYRROLATE 0.4 MG: 0.2 INJECTION, SOLUTION INTRAMUSCULAR; INTRAVENOUS at 11:05

## 2023-05-09 RX ADMIN — SCOPALAMINE 1 PATCH: 1 PATCH, EXTENDED RELEASE TRANSDERMAL at 07:05

## 2023-05-09 RX ADMIN — PROPOFOL 50 MG: 10 INJECTION, EMULSION INTRAVENOUS at 09:05

## 2023-05-09 RX ADMIN — PROPOFOL 150 MG: 10 INJECTION, EMULSION INTRAVENOUS at 08:05

## 2023-05-09 RX ADMIN — LABETALOL HYDROCHLORIDE 15 MG: 5 INJECTION, SOLUTION INTRAVENOUS at 10:05

## 2023-05-09 RX ADMIN — LIDOCAINE HYDROCHLORIDE 100 MG: 20 INJECTION INTRAVENOUS at 08:05

## 2023-05-09 RX ADMIN — NEOSTIGMINE METHYLSULFATE 4 MG: 1 INJECTION INTRAVENOUS at 11:05

## 2023-05-09 RX ADMIN — FAMOTIDINE 20 MG: 10 INJECTION, SOLUTION INTRAVENOUS at 08:05

## 2023-05-09 RX ADMIN — PROPOFOL 50 MG: 10 INJECTION, EMULSION INTRAVENOUS at 10:05

## 2023-05-09 RX ADMIN — FENTANYL CITRATE 50 MCG: 0.05 INJECTION, SOLUTION INTRAMUSCULAR; INTRAVENOUS at 09:05

## 2023-05-09 RX ADMIN — CLINDAMYCIN IN 5 PERCENT DEXTROSE 900 MG: 18 INJECTION, SOLUTION INTRAVENOUS at 09:05

## 2023-05-09 RX ADMIN — Medication 20 MG: at 09:05

## 2023-05-09 RX ADMIN — Medication 30 MG: at 08:05

## 2023-05-09 RX ADMIN — PROPOFOL 150 MCG/KG/MIN: 10 INJECTION, EMULSION INTRAVENOUS at 08:05

## 2023-05-09 RX ADMIN — METHOCARBAMOL 1000 MG: 500 TABLET ORAL at 02:05

## 2023-05-09 RX ADMIN — CARBOXYMETHYLCELLULOSE SODIUM 4 DROP: 10 GEL OPHTHALMIC at 08:05

## 2023-05-09 RX ADMIN — SODIUM CHLORIDE, SODIUM GLUCONATE, SODIUM ACETATE, POTASSIUM CHLORIDE, MAGNESIUM CHLORIDE, SODIUM PHOSPHATE, DIBASIC, AND POTASSIUM PHOSPHATE: .53; .5; .37; .037; .03; .012; .00082 INJECTION, SOLUTION INTRAVENOUS at 09:05

## 2023-05-09 NOTE — ANESTHESIA PREPROCEDURE EVALUATION
[facility]  Anesthesia Pre-Operative Evaluation         Patient Name: Ed Osei  YOB: 1980  MRN: [id number]    SUBJECTIVE:     05/09/2023    Pre-operative evaluation for Procedure(s) (LRB):  REPAIR, ROTATOR CUFF, ARTHROSCOPIC (Left)  DEBRIDEMENT, SHOULDER, ARTHROSCOPIC (Left)  FIXATION, TENDON (Left)  ORIF, FRACTURE, HUMERUS (Left)  REPAIR (Left)    Ed Osei is a 42 y.o. male with the medical history listed below who now presents for the above procedure(s).      Patient Active Problem List   Diagnosis    Nontraumatic rupture of Achilles tendon    Myopia of both eyes    Labral tear of shoulder, degenerative, right       Review of patient's allergies indicates:  No Known Allergies    Current Inpatient Medications:   oxyCODONE  10 mg Oral Q12H    pregabalin  75 mg Oral Once Pre-Op    scopolamine  1 patch Transdermal Q3 Days       No current facility-administered medications on file prior to encounter.     Current Outpatient Medications on File Prior to Encounter   Medication Sig Dispense Refill    dutasteride (AVODART) 0.5 mg capsule       finasteride (PROSCAR) 5 mg tablet Take 5 mg by mouth.         Past Surgical History:   Procedure Laterality Date    ACHILLES TENDON SURGERY  2015    ADENOIDECTOMY      SHOULDER ARTHROSCOPY      SINUS SURGERY      x2    WISDOM TOOTH EXTRACTION  [id number]= while in High School.       Social History     Socioeconomic History    Marital status:    Tobacco Use    Smoking status: Never    Smokeless tobacco: Never   Substance and Sexual Activity    Alcohol use: Yes     Alcohol/week: 5.0 standard drinks     Types: 6 Standard drinks or equivalent per week     Comment: occ.    Drug use: No    Sexual activity: Yes     Partners: Female       OBJECTIVE:     Vital Signs Range (Last 24H):  Temp:  [36.3 °C (97.3 °F)]   Pulse:  [57]   Resp:  [14]   BP: (121)/(75)   SpO2:  [100 %]       Significant Labs:  Lab Results   Component  Value Date    WBC 6.89 04/28/2023    HGB 16.2 04/28/2023    HCT 47.8 04/28/2023     04/28/2023    ALT 25 04/28/2023    AST 25 04/28/2023     04/28/2023    K 4.5 04/28/2023     04/28/2023    CREATININE 1.3 04/28/2023    BUN 17 04/28/2023    CO2 28 04/28/2023    TSH 2.154 10/20/2016    HGBA1C 4.7 04/28/2023         Diagnostic Studies:      Cardiac Studies:  EKG:   Vent. Rate : 053 BPM     Atrial Rate : 053 BPM      P-R Int : 158 ms          QRS Dur : 086 ms       QT Int : 424 ms       P-R-T Axes : 064 075 055 degrees      QTc Int : 397 ms     Sinus bradycardia   Septal infarct ,age undetermined   Abnormal ECG   No previous ECGs available   Confirmed by Gabi OLSON, Khloe VELIZ ([id number]) on 4/29/2023 1:19:05 PM     Referred By: JOSE    SELF           Confirmed By:Khloe Ashley     ASSESSMENT/PLAN:           Pre-op Assessment    I have reviewed the Patient Summary Reports.       I have reviewed the Medications.     Review of Systems  Anesthesia Hx:  H/O PONV History of prior surgery of interest to airway management or planning: Previous anesthesia: General, Nerve Block 11/13/2017  Right shoulder Arthroscopy, Labral Repair with general anesthesia.  Procedure performed at an [facility]. for 11/13/2017  Right shoulder Arthroscopy, Labral Repair.  Procedure performed at an [facility]. Airway issues documented on chart review include mask, easy, laryngeal mask airway used  Personal Hx of Anesthesia complications, Post-Operative Nausea/Vomiting, in the past, but not with recent anesthetics / prophylaxis   Social:  Non-Smoker, Social Alcohol Use    EENT/Dental:   Sinus surgery X2, [place] teeth extractions   Cardiovascular:  Cardiovascular Normal Exercise tolerance: good  Denies CAD.     Denies Angina.  Denies CAMERON. ECG has been reviewed.    Pulmonary:  Pulmonary Normal  Denies Asthma.  Denies Shortness of breath.    Renal/:  Renal/ Normal  Denies Chronic Renal Disease.     Hepatic/GI:  Hepatic/GI  Normal  Denies GERD. Denies Liver Disease. Appendectomy   Musculoskeletal:   Arthritis  Non-traumatic rotator cuff tear, left,  Closed nondisplaced fracture of greater tuberosity of left humerus,   SLAP lesion of left shoulder,  Biceps tendonitis on left,  Osteoarthritis right shoulder, S/P Right Shoulder arthroscopy, Labral Repair,  H/O right achilles Tendon Repair Spine Disorders: lumbar Disc disease    Neurological:  Neurology Normal  Denies Headaches. Denies Seizures.    Endocrine:  Endocrine Normal Denies Diabetes. Denies Hypothyroidism.    Psych:  Psychiatric Normal           Physical Exam  General: Well nourished, Cooperative, Alert and Oriented    Airway:  Mallampati: II   Mouth Opening: Normal  TM Distance: Normal  Tongue: Normal  Neck ROM: Normal ROM    Dental:  Intact    Heart:  Rate: Normal  Rhythm: Regular Rhythm        Anesthesia Plan  Type of Anesthesia, risks & benefits discussed:    Anesthesia Type: Gen ETT  Intra-op Monitoring Plan: Standard ASA Monitors  Post Op Pain Control Plan: multimodal analgesia and IV/PO Opioids PRN  Induction:  IV  Airway Plan: Direct and Video, Post-Induction  Informed Consent: Informed consent signed with the Patient and all parties understand the risks and agree with anesthesia plan.  All questions answered. Patient consented to blood products? Yes  ASA Score: 2  Day of Surgery Review of History & Physical: H&P Update referred to the surgeon/provider.    Ready For Surgery From Anesthesia Perspective.     .

## 2023-05-09 NOTE — PROGRESS NOTES
Patient is AAO and VSS.  Tolerating PO and states pain is tolerable.  Dressing CDI.  Patient states they are ready for d/c.  IV removed.  Catheter tip intact.  Caregiver at bedside.  Discharge instructions reviewed and copy given to the patient and caregiver.  Questions answered.  Both verbalized understanding.  Medication delivered to bedside. no DME needed.  Patient wheeled to car by RN/PCT.

## 2023-05-09 NOTE — DISCHARGE SUMMARY
"[place] - Surgery ([facility])  Brief Operative Note    Surgery Date: 5/9/2023     Surgeon(s) and Role:     * Tori Davis MD - Primary    Assisting Surgeon: Sathish Jacobo MD, PGY6    Pre-op Diagnosis:  Closed nondisplaced fracture of greater tuberosity of left humerus, initial encounter [S42.255A]  Non-traumatic rotator cuff tear, left [M75.102]  SLAP lesion of left shoulder [S43.432A]  Biceps tendonitis on left [M75.22]    Post-op Diagnosis:  Post-Op Diagnosis Codes:     * Closed nondisplaced fracture of greater tuberosity of left humerus, initial encounter [S42.255A]     * Non-traumatic rotator cuff tear, left [M75.102]     * SLAP lesion of left shoulder [S43.432A]     * Biceps tendonitis on left [M75.22]    Procedure(s) (LRB):  REPAIR/PATCH, ROTATOR CUFF, ARTHROSCOPIC (Left)  DECOMPRESSION, SUBACROMIAL SPACE (Left)  ARTHROSCOPY,SHOULDER,WITH BICEPS TENODESIS (Left)    Anesthesia: General    Operative Findings: left shoulder arthroscopy    Estimated Blood Loss: minimal          Specimens:   Specimen (24h ago, onward)      None              Discharge Note    OUTCOME: Patient tolerated treatment/procedure well without complication and is now ready for discharge.    DISPOSITION: Home or Self Care    FINAL DIAGNOSIS: left shoulder rotator cuff tear     FOLLOWUP: In clinic    DISCHARGE INSTRUCTIONS:    Discharge Procedure Orders   SLING FOR HOME USE   Order Comments: Post-op sling     Order Specific Question Answer Comments   Height: 5' 6" (1.676 m)    Weight: 72.1 kg (159 lb)    Does patient have medical equipment at home? none    Length of need (1-99 months): 12      Diet general     Call MD for:  temperature >100.4     Call MD for:  persistent nausea and vomiting     Call MD for:  severe uncontrolled pain     Call MD for:  difficulty breathing, headache or visual disturbances     Call MD for:  redness, tenderness, or signs of infection (pain, swelling, redness, odor or green/yellow discharge around incision site) "     Call MD for:  hives     Call MD for:  persistent dizziness or light-headedness     Ice to affected area   Order Comments: using barrier between ice and skin (specify duration&frequency)     No driving, operating heavy equipment or signing legal documents while taking pain medication     Remove dressing in 72 hours     Shower on day dressing removed (No bath)

## 2023-05-09 NOTE — OPERATIVE NOTE ADDENDUM
Certification of Assistant at Surgery       Surgery Date: 5/9/2023     Participating Surgeons:  Surgeon(s) and Role:     * Tori Davis MD - Primary    Procedures:  Procedure(s) (LRB):  REPAIR/PATCH, ROTATOR CUFF, ARTHROSCOPIC (Left)  DECOMPRESSION, SUBACROMIAL SPACE (Left)  ARTHROSCOPY,SHOULDER,WITH BICEPS TENODESIS (Left)    Assistant Surgeon's Certification of Necessity:  I understand that section 1842 (b) (6) (d) of the Social Security Act generally prohibits Medicare Part B reasonable charge payment for the services of assistants at surgery in teaching hospitals when qualified residents are available to furnish such services. I certify that the services for which payment is claimed were medically necessary, and that no qualified resident was available to perform the services. I further understand that these services are subject to post-payment review by the Medicare carrier.      MEL CHUNG DO    05/09/2023  3:15 PM

## 2023-05-09 NOTE — ANESTHESIA PROCEDURE NOTES
Intubation    Date/Time: 5/9/2023 8:53 AM  Performed by: Edita Wiggins CRNA  Authorized by: Yehuda Perez MD     Intubation:     Induction:  Intravenous    Intubated:  Postinduction    Mask Ventilation:  Easy mask    Attempts:  1    Attempted By:  CRNA    Method of Intubation:  Direct    Blade:  King 2    Laryngeal View Grade: Grade I - full view of cords      Difficult Airway Encountered?: No      Complications:  None    Airway Device:  Oral endotracheal tube    Airway Device Size:  7.5    Style/Cuff Inflation:  Cuffed    Inflation Amount (mL):  5    Tube secured:  23    Secured at:  The lips    Placement Verified By:  Capnometry    Complicating Factors:  None    Findings Post-Intubation:  BS equal bilateral and atraumatic/condition of teeth unchanged

## 2023-05-09 NOTE — OP NOTE
DATE OF PROCEDURE: 05/09/2023    SURGEON: Tori Davis M.D.    ASSISTANT: JENNIFER Jacobo MD PGY6  ASSISTANT: JENNIFER Beebe PA-C      PREOPERATIVE DIAGNOSES:   left  1. Shoulder rotator cuff tear   2. Shoulder biceps tendonitis  3. Shoulder synovitis  4. Shoulder SLAP  5. Shoulder impingement, bursitis  6. Shoulder adhesions    POSTOPERATIVE DIAGNOSES:   left  1. Shoulder rotator cuff tear   2. Shoulder biceps tendonitis  3. Shoulder synovitis  4. Shoulder SLAP  5. Shoulder impingement, bursitis  6. Shoulder adhesions    OPERATION:   left  1. Shoulder arthroscopic rotator cuff repair (upper 1/8 subscapularis) (CPT 42974) with CuffMend ()  2. Shoulder arthroscopic biceps tenodesis (CPT 35370)  3. Shoulder arthroscopic subacromial decompression, bursectomy   4. Shoulder arthroscopic extensive debridement (anterior, posterior glenohumeral joint, subacromial space) (CPT 13023)  5. Shoulder arthroscopic labral debridement (CPT 13479)  6. Shoulder arthroscopic lysis of adhesions (CPT 13242)    ANESTHESIA:  General with intra-op suprascapular nerve block with local    ESTIMATED BLOOD LOSS:  Minimal.    TOURNIQUET TIME:  None.    DRAINS:  None.    COMPLICATIONS:  None.  The patient was moved to the recovery room in stable condition with compartments soft and cap refill less than a second in all digits.    INDICATIONS/MEDICAL NECESSITY: The patient is a 42 y.o. year-old male who has history and physical examination findings consistent with the above. Preoperative studies revealed Tear, Rotator Cuff, traumatic S46.019A.  Patient was noted to have problems along the anterior and superior rotator cuff structures. The patient had clinical evidence of weakness and pain with overhead maneuvers. MRI was obtained revealing evidence of rotator cuff damage, which was consistent with the above stated preoperative diagnoses.   Nonoperative versus operative options were discussed.  The risks and benefits were discussed with the patient.   The patient acknowledged understanding and wished to proceed with operative intervention.  Informed consent was obtained prior to the procedure.  Reasonable expectations and potential complications were discussed and acknowledged, including but not limited to infection, bleeding, blood clots, (DVT and/or PE), nerve injury, tear, instability, continued pain and stiffness.  They agreed and understood and wished to proceed.    EXAMINATION UNDER ANESTHESIA of the right SHOULDER: Forward elevation 175 degrees, External rotation at 0 60 degrees, External rotation at 90 90 degrees, Internal rotation at 90 60 degrees.  Translation testing: anterior grade 1, posterior grade 1, sulcus sign grade 1 corrects to 0 on external rotation.    EXAMINATION UNDER ANESTHESIA of the left SHOULDER: Forward elevation 175 degrees, External rotation at 0 60 degrees, External rotation at 90 90 degrees, Internal rotation at 90 60 degrees.  Translation testing: anterior grade 1, posterior grade 1, sulcus sign grade 1 corrects to 0 on external rotation.    PROCEDURE IN DETAIL:  After the correct operative site was marked by the operating surgeon. The patient was then taken to the operating room and placed supine on the operating room table, where the patient  underwent general anesthesia by the anesthesia team.  The patient was then rolled into the lateral decubitus position with the operative side up.  A well-padded axillary roll, beanbag and pillows were placed.  All pressure points were carefully padded and checked.  The upper extremities and both lower extremities were placed in comfortable positions and were also well-padded.  The operative upper extremity was then prepped and draped in the usual sterile fashion.    Suprascapular nerve block was performed in the standard fashion with 5cc local anesthetic.    The Spider arm positioner was implemented with balanced suspension and appropriate landmarks were noted on the skin.  A posterior  followed by zina-superior portals were created and systematic examination of the joint revealed the following:      There was no evidence of any significant chondral lesions to the glenoid or humeral head.     Tenosynovitis and SLAP type II was noted to the biceps tendon on ramp sign.    Passport 3.5/5.5cm    Partial thickness 20% cuff (supra) tearing on articular side was debrided with shaver and gently with thermal device.    There was some synovitis and tearing to the labrum that was debrided as needed superiorly and anteriorly. Attention was then turned to the rotator cuff.  The rotator cuff undersurface was then visualized and debrided as needed using a shaver.     The surface of the rotator cuff (suscapularis upper 1/8 fibers) was then gently debrided and mobilized. The bony bed of the lesser tuberosity was prepared with the dustin. This left a nice surface for the articular surface of the cuff to be repaired to and heal to. Adequate debridement was performed, moving the arm as needed with internal/external rotation.     Bird-beak was used to pass suture through upper 1/8 subscapularis tendon. The suture was passed though the biceps tissue in a lasso-loop technique. A 4.75 x 19.1 mm Arthrex Bio-corkscrew anchor was placed at the proximal aspect of the subscapularis insertion at the lesser tuberosity. The link suture was passed though the biceps tendon in a lasso-loop technique, fastening the biceps tenodesis, repairing the subscapularis down nicely along with the biceps tendon stitches.    Attention was then turned to the subacromial space where a significant hypertrophic bursa was encountered.  The bursa itself was thickened and hypertrophic.  A lateral portal was created to assist with the bursectomy.  Subacromial decompression was completed using three-portal technique in the standard fashion with a 4.5 mm dustin without difficulty.  The anterior osteophyte was flattened.  Confirmation of adequate resection  was confirmed while viewing from the lateral portal.      Shoulder arthroscopic lysis of adhesions (CPT 67451):  With the arthroscope in the subacromial space, an extensive lysis of adhesions needed to be performed with lysis of adhesions in the lateral gutter, posterior gutter, and anterior gutter where there was extensive scarring from the chronic nature of the rotator cuff tear.  After the lysis of adhesions, the mobility was restored to the rotator cuff tissue and shoulder.    The surface of the rotator cuff was then gently debrided and mobilized.  We did this using a shaver while viewing through the posterior portal and the shaver used through the lateral portal.  We were then able to mobilize the cuff tear which was located at the supraspinatus extending to the infraspinatus.  Rotator cuff was inspected the rotator cuff from the subacromial side.There was a clearly demonstrated rotator cuff tear with size and pattern as reported. The rotator cuff was mobilized on its superficial and deep surfaces to allow maximal placement over the anatomic footprint of the greater tuberosity. The rotator cuff was mobilized on its superficial and deep surfaces to allow maximal placement over the anatomic footprint of the greater tuberosity.     Passport cannula was placed into the widened lateral portal and the Cuffmend device was introduced and deployed to repair and augment and reinforce the compromised torn rotator cuff tissue.  Fiberstitch RC x 2 were placed to stabilize the graft to the native tendon under direct Nanoscope visualization posteriorly.  2 3.5 mm Arthrex SP PushLock anchors were placed, anteriorly and posteriorly without excess tension securing the graft down laterally    Suprascapular nerve block was performed in the standard fashion with 5cc local anesthetic, and 5cc subacromially for local.  Local was placed about portals and incision(s) after irrigation, as described below, was completed. The shoulder and  subacromial space were then irrigated and fluid was extravasated using suction. All portals were reapproximated using inverted 4-0 Monocryl suture in the subcutaneous tissue of the portals. Mastisol and Steri-strips were placed with xeroform, 4x4s, abd pad, and Medi-pore tape.  TENS unit pads were placed which were medically necessary for pain relief.  An iceman was secured in the shoulder omer.  A sling with an abduction pillow was secured.  The patient was then moved to supine, extubated and taken to the recovery room where the patient arrived in stable condition with the compartments of the arm and forearm soft and cap refill less than a second in all digits.     POSTOPERATIVE PLAN: We will follow the arthroscopic rotator cuff repair guidelines for a small size rotator cuff tear.  We discussed with the patient's family after surgery.  The patient will remain in a sling for 6 weeks.  PT to start at 1-2 weeks.    Cuff specific program:  Pendulum exercises and Codman's exercises in 5-7 days, protecting rotator cuff repair for 1 week by avoiding active motion program until 1 week.     PASSIVE ROM: ER side 30 degrees, Forward Flex 90 degrees, ABD - 60 degrees   Full AAROM/PROM starting at 5-7 days as tolerated     Quality of tissue: fair     Quality of the repair: good      Size of tear: 10 x 5 mm, 20% partial thickness, upper 1/9 subscap repair

## 2023-05-09 NOTE — TRANSFER OF CARE
"Anesthesia Transfer of Care Note    Patient: Ed Osei    Procedure(s) Performed: Procedure(s) (LRB):  REPAIR/PATCH, ROTATOR CUFF, ARTHROSCOPIC (Left)  DECOMPRESSION, SUBACROMIAL SPACE (Left)  ARTHROSCOPY,SHOULDER,WITH BICEPS TENODESIS (Left)    Patient location: PACU    Anesthesia Type: general    Transport from OR: Transported from OR on 6-10 L/min O2 by face mask with adequate spontaneous ventilation    Post pain: adequate analgesia    Post assessment: no apparent anesthetic complications    Post vital signs: stable    Level of consciousness: sedated    Nausea/Vomiting: no nausea/vomiting    Complications: none    Transfer of care protocol was followed      Last vitals:   Visit Vitals  BP (!) 101/57 (BP Location: Right arm, Patient Position: Lying)   Pulse 62   Temp 36.4 °C (97.5 °F) (Temporal)   Resp 16   Ht 5' 6" (1.676 m)   Wt 72.6 kg (160 lb)   SpO2 98%   BMI 25.82 kg/m²     "

## 2023-05-09 NOTE — PLAN OF CARE
Patient has a healing scratch on his operative L forearm from a plastic toy daughter hit him with 5 days ago.     Need site marked, updated h&p and anesthesia consent.    Bed in lowest locked position, side rails up X2. Call light and personal belongings within reach. Wife not present at bedside yet. Will continue to monitor.

## 2023-05-21 ENCOUNTER — HOSPITAL ENCOUNTER (EMERGENCY)
Facility: HOSPITAL | Age: 43
Discharge: HOME | End: 2023-05-21
Attending: EMERGENCY MEDICINE
Payer: COMMERCIAL

## 2023-05-21 VITALS
RESPIRATION RATE: 14 BRPM | HEART RATE: 60 BPM | OXYGEN SATURATION: 99 % | WEIGHT: 160 LBS | TEMPERATURE: 98 F | BODY MASS INDEX: 25.71 KG/M2 | HEIGHT: 66 IN | SYSTOLIC BLOOD PRESSURE: 117 MMHG | DIASTOLIC BLOOD PRESSURE: 73 MMHG

## 2023-05-21 DIAGNOSIS — Z48.89 ENCOUNTER FOR POST SURGICAL WOUND CHECK: ICD-10-CM

## 2023-05-21 DIAGNOSIS — G89.18 POST-OPERATIVE PAIN: Primary | ICD-10-CM

## 2023-05-21 LAB
BUN SERPL-MCNC: 17 MG/DL (ref 6–30)
CHLORIDE SERPL-SCNC: 101 MMOL/L (ref 95–110)
CREAT SERPL-MCNC: 1 MG/DL (ref 0.5–1.4)
GLUCOSE SERPL-MCNC: 85 MG/DL (ref 70–110)
HCT VFR BLD CALC: 46 %PCV (ref 36–54)
POC IONIZED CALCIUM: 1.24 MMOL/L (ref 1.06–1.42)
POC TCO2 (MEASURED): 27 MMOL/L (ref 23–29)
POTASSIUM BLD-SCNC: 3.9 MMOL/L (ref 3.5–5.1)
SAMPLE: NORMAL
SODIUM BLD-SCNC: 140 MMOL/L (ref 136–145)

## 2023-05-21 PROCEDURE — 96365 THER/PROPH/DIAG IV INF INIT: CPT

## 2023-05-21 PROCEDURE — 25000003 PHARM REV CODE 250

## 2023-05-21 PROCEDURE — 99284 EMERGENCY DEPT VISIT MOD MDM: CPT | Mod: 25

## 2023-05-21 PROCEDURE — 99284 EMERGENCY DEPT VISIT MOD MDM: CPT | Mod: ,,, | Performed by: EMERGENCY MEDICINE

## 2023-05-21 PROCEDURE — 63600175 PHARM REV CODE 636 W HCPCS: Performed by: EMERGENCY MEDICINE

## 2023-05-21 PROCEDURE — 99284 PR EMERGENCY DEPT VISIT,LEVEL IV: ICD-10-PCS | Mod: ,,, | Performed by: EMERGENCY MEDICINE

## 2023-05-21 PROCEDURE — 96375 TX/PRO/DX INJ NEW DRUG ADDON: CPT

## 2023-05-21 PROCEDURE — 63600175 PHARM REV CODE 636 W HCPCS

## 2023-05-21 PROCEDURE — 80048 BASIC METABOLIC PNL TOTAL CA: CPT

## 2023-05-21 RX ORDER — METHOCARBAMOL 750 MG/1
750 TABLET, FILM COATED ORAL 3 TIMES DAILY
Qty: 42 TABLET | Refills: 0 | Status: SHIPPED | OUTPATIENT
Start: 2023-05-21 | End: 2023-06-04

## 2023-05-21 RX ORDER — ONDANSETRON 2 MG/ML
4 INJECTION INTRAMUSCULAR; INTRAVENOUS
Status: COMPLETED | OUTPATIENT
Start: 2023-05-21 | End: 2023-05-21

## 2023-05-21 RX ORDER — MORPHINE SULFATE 4 MG/ML
4 INJECTION, SOLUTION INTRAMUSCULAR; INTRAVENOUS
Status: COMPLETED | OUTPATIENT
Start: 2023-05-21 | End: 2023-05-21

## 2023-05-21 RX ORDER — ACETAMINOPHEN 500 MG
1000 TABLET ORAL
Status: COMPLETED | OUTPATIENT
Start: 2023-05-21 | End: 2023-05-21

## 2023-05-21 RX ORDER — KETOROLAC TROMETHAMINE 30 MG/ML
30 INJECTION, SOLUTION INTRAMUSCULAR; INTRAVENOUS
Status: COMPLETED | OUTPATIENT
Start: 2023-05-21 | End: 2023-05-21

## 2023-05-21 RX ORDER — CELECOXIB 200 MG/1
200 CAPSULE ORAL 2 TIMES DAILY
Qty: 30 CAPSULE | Refills: 0 | Status: SHIPPED | OUTPATIENT
Start: 2023-05-21

## 2023-05-21 RX ADMIN — METHOCARBAMOL 1000 MG: 100 INJECTION INTRAMUSCULAR; INTRAVENOUS at 01:05

## 2023-05-21 RX ADMIN — ONDANSETRON 4 MG: 2 INJECTION INTRAMUSCULAR; INTRAVENOUS at 10:05

## 2023-05-21 RX ADMIN — MORPHINE SULFATE 4 MG: 4 INJECTION INTRAVENOUS at 10:05

## 2023-05-21 RX ADMIN — ACETAMINOPHEN 1000 MG: 500 TABLET ORAL at 11:05

## 2023-05-21 RX ADMIN — KETOROLAC TROMETHAMINE 30 MG: 30 INJECTION, SOLUTION INTRAMUSCULAR; INTRAVENOUS at 11:05

## 2023-05-21 NOTE — ED NOTES
I-STAT Chem-8+ Results:   Value Reference Range   Sodium 140 136-145 mmol/L   Potassium  3.9 3.5-5.1 mmol/L   Chloride 101  mmol/L   Ionized Calcium 1.24 1.06-1.42 mmol/L   CO2 (measured) 27 23-29 mmol/L   Glucose 85  mg/dL   BUN 17 6-30 mg/dL   Creatinine 1.0 0.5-1.4 mg/dL   Hematocrit 46 36-54%

## 2023-05-21 NOTE — CONSULTS
"Orthopedic Surgery  Consult Note    Ed Osei  05/21/2023    CC: L shoulder pain    HPI: Ed Osei is a 42 y.o. male who is s/p L rotator cuff repair by Dr. Davis on 5/9/23 who presents with L shoulder pain after sneezing this morning and "feeling a pop in his shoulder". Patients states he feels like shoulder is tight and has pain with ROM. Denies other MSK pains or paresthesias. Pain was not controlled with oral meds at home, which prompted presentation to ED.      Past Medical History:   Diagnosis Date    PONV (postoperative nausea and vomiting)      Past Surgical History:   Procedure Laterality Date    ACHILLES TENDON SURGERY  2015    ADENOIDECTOMY      ARTHROSCOPIC REPAIR OF ROTATOR CUFF OF SHOULDER Left 5/9/2023    Procedure: REPAIR/PATCH, ROTATOR CUFF, ARTHROSCOPIC;  Surgeon: Tori Davis MD;  Location: [facility] OR;  Service: Orthopedics;  Laterality: Left;    ARTHROSCOPY,SHOULDER,WITH BICEPS TENODESIS Left 5/9/2023    Procedure: ARTHROSCOPY,SHOULDER,WITH BICEPS TENODESIS;  Surgeon: Tori Davis MD;  Location: [facility] OR;  Service: Orthopedics;  Laterality: Left;    DECOMPRESSION OF SUBACROMIAL SPACE Left 5/9/2023    Procedure: DECOMPRESSION, SUBACROMIAL SPACE;  Surgeon: Tori Davis MD;  Location: [facility] OR;  Service: Orthopedics;  Laterality: Left;    SHOULDER ARTHROSCOPY      SINUS SURGERY      x2    WISDOM TOOTH EXTRACTION  [id number]= while in High School.     Family History   Problem Relation Age of Onset    No Known Problems Father     Hyperlipidemia Mother     Hypertension Mother     Cataracts Mother     No Known Problems Maternal Aunt     No Known Problems Maternal Uncle     No Known Problems Paternal Aunt     No Known Problems Paternal Uncle     No Known Problems Paternal Grandmother     No Known Problems Paternal Grandfather     No Known Problems Maternal Grandmother     No Known Problems Maternal Grandfather     No Known Problems Sister     Hyperlipidemia Brother     Hypertension Brother     " Amblyopia Neg Hx     Blindness Neg Hx     Cancer Neg Hx     Diabetes Neg Hx     Glaucoma Neg Hx     Macular degeneration Neg Hx     Retinal detachment Neg Hx     Strabismus Neg Hx     Stroke Neg Hx     Thyroid disease Neg Hx      Social History     Socioeconomic History    Marital status:    Tobacco Use    Smoking status: Never    Smokeless tobacco: Never   Substance and Sexual Activity    Alcohol use: Yes     Alcohol/week: 5.0 standard drinks     Types: 6 Standard drinks or equivalent per week     Comment: occ.    Drug use: No    Sexual activity: Yes     Partners: Female     No current facility-administered medications on file prior to encounter.     Current Outpatient Medications on File Prior to Encounter   Medication Sig    aspirin (ECOTRIN) 81 MG EC tablet Take 1 tablet (81 mg total) by mouth once daily. For 4 weeks starting the day after surgery.    dutasteride (AVODART) 0.5 mg capsule     finasteride (PROSCAR) 5 mg tablet Take 5 mg by mouth.    oxyCODONE-acetaminophen (PERCOCET)  mg per tablet Take 1 tablet by mouth every 4-6 hours as needed for pain. Take stool softener with this medication.    promethazine (PHENERGAN) 25 MG tablet Take 1 tablet (25 mg total) by mouth every 6 (six) hours as needed for Nausea.    traMADoL (ULTRAM) 50 mg tablet Take 1-2 tablets ( mg total) by mouth every 6 (six) hours as needed for Pain.         Review of Systems:  Constitutional: Denies fever/chills  Neurological: Denies numbness/tingling (any exceptions noted in orthopaedic exam)   Psychiatric/Behavioral: Denies change in normal mood  Eyes: Denies change in vision  Cardiovascular: Denies chest pain  Respiratory: Denies shortness of breath  Hematologic/Lymphatic: Denies easy bleeding/bruising   Skin: Denies new rash or skin lesions   Gastrointestinal: Denies nausea/vomitting/diarrhea, change in bowel habits, abdominal pain   Allergic/Immunologic: Denies adverse reactions to current  "medications  Musculoskeletal: see HPI      Physical Exam:    Temp:  [97.5 °F (36.4 °C)] 97.5 °F (36.4 °C)  Pulse:  [63-71] 70  Resp:  [18-24] 24  SpO2:  [97 %-100 %] 100 %  BP: (130-173)/(82-91) 135/82    Vitals: Afebrile.  Vital signs stable.  General: No acute distress.  HEENT: Normocephalic. Atraumatic. Sclera anicteric. No tracheal deviation.  Cardio: Regular rate.  Chest: No increased work of breathing.  Abdominal: Nondistended.  Extremities: No cyanosis.  No clubbing.    Skin: No generalized rash.  Neuro: Awake. Alert. Oriented to person, place, time, and situation.  Psych: Normal appearance. Cooperative.  Appropriate mood.  Appropriate affect.      MSK:  AAOx4  NAD  Reg rate  No increased WOB    LUE:  Incisions c/d/I  ROM L shoulder limited 2/2 pain, able to externally rotate 30 degrees with arm at side, forward flex to 90 degrees  FROM elbow, and wrist  SILT M/U/R  Motor intact AIN/PIN/M/U/R  WWP extremities      Diagnostic Results: Xrays of the L shoulder show humeral head seated within glenoid, and there is anterior pseudosubluxation, consistent with possible hemarthrosis        Assessment/Plan:  Ed Osei is a 42 y.o. male who is s/p L rotator cuff repair by Dr. Davis on 5/9/23 who presents with L shoulder pain after sneezing this morning and "feeling a pop in his shoulder". Xrays confirmed that glenohumeral joint was not dislocated. Given history of pop and pseudosubluxation - possible recurrent tear or failure of repair.    - Will ensure pain is adequately controlled in ED  - Multimodal pain regimen  - NWB to LUE in sling, pt encouraged to keep extremity iced   - Follow-up with Ortho Clinic on urgent basis (patient will call Dr. Davis's clinic tomorrow morning)      Blul Naik MD  Orthopedic Surgery Resident  05/21/2023      "

## 2023-05-21 NOTE — ED PROVIDER NOTES
Encounter Date: 5/21/2023       History     Chief Complaint   Patient presents with    Post-op Problem     Recent L shoulder sx and pt sneezed this morning and heard/felt something pop and has not been able to move L arm since     Pt is a 43 yo M with recent left shoulder surgery who presents with severe pain that started after he sneezed this morning. He took the opiate pain medication he was prescribed post surgery which he has not needed over the past several days with minimal relief.    The history is provided by the patient.   Review of patient's allergies indicates:  No Known Allergies  Past Medical History:   Diagnosis Date    PONV (postoperative nausea and vomiting)      Past Surgical History:   Procedure Laterality Date    ACHILLES TENDON SURGERY  2015    ADENOIDECTOMY      ARTHROSCOPIC REPAIR OF ROTATOR CUFF OF SHOULDER Left 5/9/2023    Procedure: REPAIR/PATCH, ROTATOR CUFF, ARTHROSCOPIC;  Surgeon: Tori Davis MD;  Location: [facility] OR;  Service: Orthopedics;  Laterality: Left;    ARTHROSCOPY,SHOULDER,WITH BICEPS TENODESIS Left 5/9/2023    Procedure: ARTHROSCOPY,SHOULDER,WITH BICEPS TENODESIS;  Surgeon: Tori Davis MD;  Location: [facility] OR;  Service: Orthopedics;  Laterality: Left;    DECOMPRESSION OF SUBACROMIAL SPACE Left 5/9/2023    Procedure: DECOMPRESSION, SUBACROMIAL SPACE;  Surgeon: Tori Davis MD;  Location: [facility] OR;  Service: Orthopedics;  Laterality: Left;    SHOULDER ARTHROSCOPY      SINUS SURGERY      x2    WISDOM TOOTH EXTRACTION  [id number]= while in High School.     Family History   Problem Relation Age of Onset    No Known Problems Father     Hyperlipidemia Mother     Hypertension Mother     Cataracts Mother     No Known Problems Maternal Aunt     No Known Problems Maternal Uncle     No Known Problems Paternal Aunt     No Known Problems Paternal Uncle     No Known Problems Paternal Grandmother     No Known Problems Paternal Grandfather     No Known Problems Maternal Grandmother     No Known  Problems Maternal Grandfather     No Known Problems Sister     Hyperlipidemia Brother     Hypertension Brother     Amblyopia Neg Hx     Blindness Neg Hx     Cancer Neg Hx     Diabetes Neg Hx     Glaucoma Neg Hx     Macular degeneration Neg Hx     Retinal detachment Neg Hx     Strabismus Neg Hx     Stroke Neg Hx     Thyroid disease Neg Hx      Social History     Tobacco Use    Smoking status: Never    Smokeless tobacco: Never   Substance Use Topics    Alcohol use: Yes     Alcohol/week: 5.0 standard drinks     Types: 6 Standard drinks or equivalent per week     Comment: occ.    Drug use: No         Physical Exam     Initial Vitals [05/21/23 0923]   BP Pulse Resp Temp SpO2   (!) 173/91 63 18 97.5 °F (36.4 °C) 100 %      MAP       --         Physical Exam    Nursing note and vitals reviewed.  Constitutional: He appears well-developed and well-nourished. He is not diaphoretic. No distress.   HENT:   Head: Normocephalic and atraumatic.   Eyes: EOM are normal.   Neck: Neck supple.   Normal range of motion.  Cardiovascular:  Normal rate and regular rhythm.           Radial pulse 2+ to L wrist   Pulmonary/Chest: No respiratory distress.   Abdominal: He exhibits no distension.   Musculoskeletal:         General: Normal range of motion.      Cervical back: Normal range of motion and neck supple.      Comments: Swelling to shoulder shoulder joint  Guarding left arm     Neurological: He is alert and oriented to person, place, and time. GCS score is 15. GCS eye subscore is 4. GCS verbal subscore is 5. GCS motor subscore is 6.   Skin: Skin is warm and dry. Capillary refill takes less than 2 seconds.   Psychiatric: He has a normal mood and affect. His behavior is normal. Judgment and thought content normal.       ED Course   Procedures  Labs Reviewed   ISTAT PROCEDURE          Imaging Results              X-Ray Shoulder 2 or More Views Left (Final result)  Result time 05/21/23 11:07:22      Final result by Medardo Clark MD  (05/21/23 11:07:22)                   Impression:      Suspected anterior inferior subluxation of glenohumeral joint.      Electronically signed by: Medardo Clark MD  Date:    05/21/2023  Time:    11:07               Narrative:    EXAMINATION:  XR SHOULDER COMPLETE 2 OR MORE VIEWS LEFT    CLINICAL HISTORY:  left shoulder pain;    TECHNIQUE:  Two or three views of the left shoulder were performed.    COMPARISON:  04/19/2023    FINDINGS:  Bones: No fracture.  No lytic or blastic lesion.  Biceps tenodesis lucency noted in the proximal humerus.    Joints: Suspected anterior inferior subluxation of the glenohumeral joint.  Acromioclavicular joint is unremarkable.    Soft tissues: Unremarkable.                                       Medications   morphine injection 4 mg (4 mg Intravenous Given 5/21/23 1005)   ondansetron injection 4 mg (4 mg Intravenous Given 5/21/23 1005)   ketorolac injection 30 mg (30 mg Intravenous Given 5/21/23 1107)   methocarbamoL (ROBAXIN) 1,000 mg in dextrose 5 % (D5W) 100 mL IVPB (0 mg Intravenous Stopped 5/21/23 1359)   acetaminophen tablet 1,000 mg (1,000 mg Oral Given 5/21/23 1108)     Medical Decision Making:   History:   Old Medical Records: I decided to obtain old medical records.  Old Records Summarized: records from clinic visits and records from previous admission(s).  Initial Assessment:   L shoulder pain with recent L shoulder surgery  Differential Diagnosis:   Swelling, infection, suture line dehiscence  Clinical Tests:   Radiological Study: Ordered and Reviewed  ED Management:  Treated pain and xray to evaluate bones for fracture or dislocation  Ortho consulted  Other:   I have discussed this case with another health care provider.       <> Summary of the Discussion: Case was discussed with ortho, plan for pain control  Patient was discharged on robaxin and celebrex                        Clinical Impression:   Final diagnoses:  [G89.18] Post-operative pain (Primary)  [Z48.89]  Encounter for post surgical wound check        ED Disposition Condition    Discharge Stable          ED Prescriptions       Medication Sig Dispense Start Date End Date Auth. Provider    methocarbamoL (ROBAXIN) 750 MG Tab Take 1 tablet (750 mg total) by mouth 3 (three) times daily. for 14 days 42 tablet 5/21/2023 6/4/2023 Bull Naik MD    celecoxib (CELEBREX) 200 MG capsule Take 1 capsule (200 mg total) by mouth 2 (two) times daily. 30 capsule 5/21/2023 -- Bull Naik MD          Follow-up Information       Follow up With Specialties Details Why Contact Info    Tori Davis MD Sports Medicine, Orthopedic Surgery   [address]  [facility] [address]  [phone]               Barbara Singh MD  05/26/23 1239

## 2023-05-21 NOTE — ED TRIAGE NOTES
Pt to ED via personal transport with c/o 10/10 L shoulder pain. Shoulder surgery 5/9, pt states this am sneezed 2x and felt 'something pop'' at site. Pt states 10/10 pain since then, no relief from percocet prescribed after surgery.

## 2023-05-21 NOTE — DISCHARGE INSTRUCTIONS
I recommend close follow up with your orthopedic doctor. The orthopedic resident sent some prescriptions down to [facility] main pharmacy.     Seek immediate medical attention if you have increased pain, swelling, or for any other concern.

## 2023-05-22 ENCOUNTER — TELEPHONE (OUTPATIENT)
Dept: SPORTS MEDICINE | Facility: CLINIC | Age: 43
End: 2023-05-22
Payer: COMMERCIAL

## 2023-05-22 NOTE — TELEPHONE ENCOUNTER
I called the patient and he notes pain in his shoulder after sneezing. We discussed this and his pain and swelling. He was advised to use ice and take the NSAID prescribed from the ED until he sees Derek on Wednesday. He verbalized understanding and was appreciative of the phone call

## 2023-05-23 ENCOUNTER — CLINICAL SUPPORT (OUTPATIENT)
Dept: REHABILITATION | Facility: HOSPITAL | Age: 43
End: 2023-05-23
Payer: COMMERCIAL

## 2023-05-23 DIAGNOSIS — G89.29 CHRONIC LEFT SHOULDER PAIN: ICD-10-CM

## 2023-05-23 DIAGNOSIS — M25.512 CHRONIC LEFT SHOULDER PAIN: ICD-10-CM

## 2023-05-23 DIAGNOSIS — M75.102 NON-TRAUMATIC ROTATOR CUFF TEAR, LEFT: ICD-10-CM

## 2023-05-23 PROCEDURE — 97530 THERAPEUTIC ACTIVITIES: CPT | Mod: PN

## 2023-05-23 PROCEDURE — 97161 PT EVAL LOW COMPLEX 20 MIN: CPT | Mod: PN

## 2023-05-23 PROCEDURE — 97112 NEUROMUSCULAR REEDUCATION: CPT | Mod: PN

## 2023-05-23 NOTE — PLAN OF CARE
[facility]   Physical Therapy Initial Evaluation      Name: Ed Osei  Clinic Number: [id number]    Therapy Diagnosis:   Encounter Diagnoses   Name Primary?    Non-traumatic rotator cuff tear, left     Chronic left shoulder pain         Physician: Sincere Beebe III, *    Physician Orders: PT Eval and Treat  Medical Diagnosis from Referral: M75.102 (ICD-10-CM) - Non-traumatic rotator cuff tear, left  Evaluation Date: 5/23/2023  Authorization Period Expiration: 6/23/23  Plan of Care Expiration: 10/29/23  Progress Note Due: 6/23/23  Visit # / Visits authorized: 1/ 1   Next MD visit: 5/24/23, and 6/19/23  FOTO: 1/3    Precautions: Standard   DOS 5/9/23 , POW 2    POSTOPERATIVE PLAN: We will follow the arthroscopic rotator cuff repair guidelines for a small size rotator cuff tear.  We discussed with the patient's family after surgery.  The patient will remain in a sling for 6 weeks.  PT to start at 1-2 weeks.    Cuff specific program:  Pendulum exercises and Codman's exercises in 5-7 days, protecting rotator cuff repair for 1 week by avoiding active motion program until 1 week.     PASSIVE ROM: ER side 30 degrees, Forward Flex 90 degrees, ABD - 60 degrees   Full AAROM/PROM starting at 5-7 days as tolerated     OPERATION:   left  1. Shoulder arthroscopic rotator cuff repair (upper 1/8 subscapularis) (CPT 21054) with [employer] ()  2. Shoulder arthroscopic biceps tenodesis (CPT 16828)  3. Shoulder arthroscopic subacromial decompression, bursectomy   4. Shoulder arthroscopic extensive debridement (anterior, posterior glenohumeral joint, subacromial space) (CPT 00663)  5. Shoulder arthroscopic labral debridement (CPT 32394)  6. Shoulder arthroscopic lysis of adhesions (CPT 44702)    Time In: 11:00 am  Time Out: 11:55 am  Total Appointment Time (timed & untimed codes): 55 minutes    Subjective     Date of onset: DOS 5/9/23 , POW2    History of current condition - Ed reports: pascale  of L shoulder pain after working out for about 2 months prior to surgery. The pain slowly became worse overtime. Opted for RTC surgery with Dr Davis with the details listed above. Since the surgery he has been doing well, but did have one incidence where he went to the ER after severe shoulder pain after sneezing. He was cleared at the ER and also informed MD about this. Sees PA on Wednesday. Currently the pt denies n/t, excessive swelling, pain, warmth or redness. He is currently sleeping in his recliner, tried sleeping in his bed but this was too painful. He reports that he wraps his arm and is careful not to get incisions wet when showering, but reports that he has not used water proof bandages. Hx of R labrum repair on R side in 2018 which is still painful and stiff.     Falls: none    Imaging: MRI studies:     Impression:     1. Subscapularis interstitial tear.  2. SLAP tear, extending from the anterosuperior to posterior labrum.  3. Partial thickness chondral loss of the posterior glenoid.  4. Mild AC joint arthrosis.  5. Small joint effusion.    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  Prior Level of Function: ADLs limited per shoulder pain  Current Level of Function: limited per post op    Pain:  Current 0/10, worst 10/10, best 0/10   Location: left shoulder  Description: Aching, Dull, and Sharp  Aggravating Factors: movement  Easing Factors: rest    Patients goals: return to lifting weights,      Medical History:   Past Medical History:   Diagnosis Date    PONV (postoperative nausea and vomiting)        Surgical History:   Ed Osei  has a past surgical history that includes Adenoidectomy; Sinus surgery; [place] tooth extraction ([id number]= while in High School.); Achilles tendon surgery (2015); Shoulder arthroscopy; Arthroscopic repair of rotator cuff of shoulder (Left, 5/9/2023); Decompression of subacromial space (Left,  5/9/2023); and arthroscopy,shoulder,with biceps tenodesis (Left, 5/9/2023).    Medications:   Ed has a current medication list which includes the following prescription(s): aspirin, celecoxib, dutasteride, finasteride, methocarbamol, oxycodone-acetaminophen, promethazine, and tramadol.    Allergies:   Review of patient's allergies indicates:  No Known Allergies     Objective      Observation: Patient enters clinic with ABD sling donned, wounds clean/dry/intact with no signs of infection or excessive drainage    Posture: slight rounded shoulders       Shoulder Active Range of Motion: Not tested 2/2 post-operative status     Shoulder Passive Range of Motion:   Shoulder Right Left   Flexion   170 100   ER at 0   45 30   ER at 90   Not tested 2/2 post-op Not tested 2/2 post-op   IR at 90   Not tested 2/2 post-op Not tested 2/2 post-op     Strength: not tested 2/2 post-operative status    Special Tests: not tested 2/2 post-operative status    Joint Mobility: hypomobile posteriorly     Palpation: warmth and swelling around incision as expected per post op         Limitation/Restriction for FOTO shoulder Survey    Therapist reviewed FOTO scores for Ed Osei on 5/23/2023.   FOTO documents entered into [employer] - see Media section.    Limitation Score: TBA%         Treatment     Total Treatment time (time-based codes) separate from Evaluation: 33 minutes     Ed received the treatments listed below:      therapeutic exercises to develop strength and ROM for 00 minutes including:    manual therapy techniques: Joint mobilizations and Soft tissue Mobilization were applied to the: L shoulder for 00 minutes, including:  NP    neuromuscular re-education activities to improve: Kinesthetic and Proprioception for 23 minutes. The following activities were included:  Scap squeeze - x20   Shrugs - x20   Shoulder rolls - x20   Toe touches - x20  Pendulums F/B, S/S, circles - 1 mins ea  Table slides scaption - 2 mins      therapeutic activities to improve functional performance for 10 minutes, including:  Pt education on precautions, sling management, HEP, goals for early stage RTC repair, s/s of infection      Patient Education and Home Exercises     Education provided:   - see above    Written Home Exercises Provided: yes. Exercises were reviewed and Ed was able to demonstrate them prior to the end of the session.  Ed demonstrated good  understanding of the education provided. See EMR under Patient Instructions for exercises provided during therapy sessions.    Assessment     Ed is a 42 y.o. male referred to outpatient Physical Therapy with a medical diagnosis of M75.102 (ICD-10-CM) - Non-traumatic rotator cuff tear, left. with complaints of continued L shoulder pain and stiffness consistent with referring dx limiting ADL's and functional activities 2/2 post-operative status. Upon evaluation patient presents with decreased ROM, joint mobility and flexibility restrictions, decreased strength and motor control contributing to limited functional status at this time. Patient would benefit from appropriate manual therapy, mobility, flexibility, strengthening and NM re-education in order to address the before-mentioned deficits and return to PLOF with ADLs and work      Patient prognosis is Excellent.   Patient will benefit from skilled outpatient Physical Therapy to address the deficits stated above and in the chart below, provide patient /family education, and to maximize patientt's level of independence.     Plan of care discussed with patient: Yes  Patient's spiritual, cultural and educational needs considered and patient is agreeable to the plan of care and goals as stated below:     Anticipated Barriers for therapy: none    Medical Necessity is demonstrated by the following  History  Co-morbidities and personal factors that may impact the plan of care [x] LOW: no personal factors / co-morbidities  [] MODERATE: 1-2  personal factors / co-morbidities  [] HIGH: 3+ personal factors / co-morbidities       Examination  Body Structures and Functions, activity limitations and participation restrictions that may impact the plan of care [x] LOW: addressing 1-2 elements  [] MODERATE: 3+ elements  [] HIGH: 4+ elements (please support below)       Clinical Presentation [x] LOW: stable  [] MODERATE: Evolving  [] HIGH: Unstable     Decision Making/ Complexity Score: low         GOALS: Short Term Goals:  8 weeks  1.Report decreased shoulder pain < / =  4/10 at worst to increase tolerance for working  2. Increase PROM to WNL.   3. Pt will be able to tolerate MMT testing and measure at no lower that 4-/5 for flexor, abductors, external rotators and internal rotators.   4. Pt to tolerate HEP to improve ROM and independence with ADL's    Long Term Goals: 16  weeks  1.Report decreased shoulder pain  < / =  1 /10 at worst to increase tolerance for working.   2.Increase AROM to WNL.  3.Increase strength to >/= 4/+5 in areas of limitation to increase tolerance for ADL and work activities.  4. Pt goal: able to return to normal exercise routine with no limitations from shoulder.   5. Pt will have improved gcode of CJ (20-40% limited) on FOTO shoulder in order to demonstrate true functional improvement.   Plan     Plan of care Certification: 5/23/2023 to 10/29/23.    Outpatient Physical Therapy 1-2 times weekly for 10 weeks to include the following interventions: Gait Training, Manual Therapy, Neuromuscular Re-ed, Therapeutic Activities, and Therapeutic Exercise.       Arturo Aleman, PT, DPT  Co treated with Puma Brooks PT, DPT.

## 2023-05-24 ENCOUNTER — OFFICE VISIT (OUTPATIENT)
Dept: SPORTS MEDICINE | Facility: CLINIC | Age: 43
End: 2023-05-24
Payer: COMMERCIAL

## 2023-05-24 VITALS
WEIGHT: 160 LBS | DIASTOLIC BLOOD PRESSURE: 79 MMHG | SYSTOLIC BLOOD PRESSURE: 126 MMHG | BODY MASS INDEX: 25.71 KG/M2 | HEIGHT: 66 IN | HEART RATE: 79 BPM

## 2023-05-24 DIAGNOSIS — M75.102 NON-TRAUMATIC ROTATOR CUFF TEAR, LEFT: ICD-10-CM

## 2023-05-24 DIAGNOSIS — Z98.890 S/P SHOULDER SURGERY: Primary | ICD-10-CM

## 2023-05-24 DIAGNOSIS — Z09 S/P ORTHOPEDIC SURGERY, FOLLOW-UP EXAM: ICD-10-CM

## 2023-05-24 PROCEDURE — 3044F HG A1C LEVEL LT 7.0%: CPT | Mod: CPTII,S$GLB,, | Performed by: PHYSICIAN ASSISTANT

## 2023-05-24 PROCEDURE — 99999 PR PBB SHADOW E&M-EST. PATIENT-LVL III: CPT | Mod: PBBFAC,,, | Performed by: PHYSICIAN ASSISTANT

## 2023-05-24 PROCEDURE — 99999 PR PBB SHADOW E&M-EST. PATIENT-LVL III: ICD-10-PCS | Mod: PBBFAC,,, | Performed by: PHYSICIAN ASSISTANT

## 2023-05-24 PROCEDURE — 1160F PR REVIEW ALL MEDS BY PRESCRIBER/CLIN PHARMACIST DOCUMENTED: ICD-10-PCS | Mod: CPTII,S$GLB,, | Performed by: PHYSICIAN ASSISTANT

## 2023-05-24 PROCEDURE — 99024 POSTOP FOLLOW-UP VISIT: CPT | Mod: S$GLB,,, | Performed by: PHYSICIAN ASSISTANT

## 2023-05-24 PROCEDURE — 1159F PR MEDICATION LIST DOCUMENTED IN MEDICAL RECORD: ICD-10-PCS | Mod: CPTII,S$GLB,, | Performed by: PHYSICIAN ASSISTANT

## 2023-05-24 PROCEDURE — 1160F RVW MEDS BY RX/DR IN RCRD: CPT | Mod: CPTII,S$GLB,, | Performed by: PHYSICIAN ASSISTANT

## 2023-05-24 PROCEDURE — 3074F PR MOST RECENT SYSTOLIC BLOOD PRESSURE < 130 MM HG: ICD-10-PCS | Mod: CPTII,S$GLB,, | Performed by: PHYSICIAN ASSISTANT

## 2023-05-24 PROCEDURE — 1159F MED LIST DOCD IN RCRD: CPT | Mod: CPTII,S$GLB,, | Performed by: PHYSICIAN ASSISTANT

## 2023-05-24 PROCEDURE — 3008F PR BODY MASS INDEX (BMI) DOCUMENTED: ICD-10-PCS | Mod: CPTII,S$GLB,, | Performed by: PHYSICIAN ASSISTANT

## 2023-05-24 PROCEDURE — 99024 PR POST-OP FOLLOW-UP VISIT: ICD-10-PCS | Mod: S$GLB,,, | Performed by: PHYSICIAN ASSISTANT

## 2023-05-24 PROCEDURE — 3078F PR MOST RECENT DIASTOLIC BLOOD PRESSURE < 80 MM HG: ICD-10-PCS | Mod: CPTII,S$GLB,, | Performed by: PHYSICIAN ASSISTANT

## 2023-05-24 PROCEDURE — 3044F PR MOST RECENT HEMOGLOBIN A1C LEVEL <7.0%: ICD-10-PCS | Mod: CPTII,S$GLB,, | Performed by: PHYSICIAN ASSISTANT

## 2023-05-24 PROCEDURE — 3074F SYST BP LT 130 MM HG: CPT | Mod: CPTII,S$GLB,, | Performed by: PHYSICIAN ASSISTANT

## 2023-05-24 PROCEDURE — 3008F BODY MASS INDEX DOCD: CPT | Mod: CPTII,S$GLB,, | Performed by: PHYSICIAN ASSISTANT

## 2023-05-24 PROCEDURE — 3078F DIAST BP <80 MM HG: CPT | Mod: CPTII,S$GLB,, | Performed by: PHYSICIAN ASSISTANT

## 2023-05-24 RX ORDER — OXYCODONE AND ACETAMINOPHEN 10; 325 MG/1; MG/1
TABLET ORAL
Qty: 10 TABLET | Refills: 0 | Status: SHIPPED | OUTPATIENT
Start: 2023-05-24

## 2023-05-25 ENCOUNTER — CLINICAL SUPPORT (OUTPATIENT)
Dept: REHABILITATION | Facility: HOSPITAL | Age: 43
End: 2023-05-25
Payer: COMMERCIAL

## 2023-05-25 DIAGNOSIS — G89.29 CHRONIC LEFT SHOULDER PAIN: Primary | ICD-10-CM

## 2023-05-25 DIAGNOSIS — M25.512 CHRONIC LEFT SHOULDER PAIN: Primary | ICD-10-CM

## 2023-05-25 PROCEDURE — 97140 MANUAL THERAPY 1/> REGIONS: CPT | Mod: PN

## 2023-05-25 PROCEDURE — 97112 NEUROMUSCULAR REEDUCATION: CPT | Mod: PN

## 2023-05-25 PROCEDURE — 97110 THERAPEUTIC EXERCISES: CPT | Mod: PN

## 2023-05-25 NOTE — PROGRESS NOTES
Chief Complaint: Left shoulder pain    HISTORY OF PRESENT ILLNESS:   Pt is here today for post-operative followup of shoulder arthroscopy.  he is doing well.  We have reviewed patient's findings and discussed plan of care and future treatment options.      Tolerating pain well. Still taking pain medications at night to sleep.  He had 2 hard subsequent sneezes over the weekend which caused severe pain. Caused him to present to ED. This pain has improved since then.   Wearing sling which is in place.  No other issues reported.     Pain at a 3/10.     DATE OF PROCEDURE: 05/09/2023     SURGEON: Tori Davis M.D.  OPERATION:   left  1. Shoulder arthroscopic rotator cuff repair (upper 1/8 subscapularis) (CPT 64507) with [employer] ()  2. Shoulder arthroscopic biceps tenodesis (CPT 72396)  3. Shoulder arthroscopic subacromial decompression, bursectomy   4. Shoulder arthroscopic extensive debridement (anterior, posterior glenohumeral joint, subacromial space) (CPT 37468)  5. Shoulder arthroscopic labral debridement (CPT 73005)  6. Shoulder arthroscopic lysis of adhesions (CPT 91810)                                                                                  PHYSICAL EXAMINATION:     Incision sites healed well  No evidence of any erythema, infection or induration  elbow Range of motion full   Minimal effusion  2+ Radial pulses  No swelling                                                                               ASSESSMENT:                                                                                                                                               1. Status post above, doing well.                                                                                                                               PLAN:                                                                                                                                                     1. PT to continue; wean narcotics  2.  Emphasized scapular function.  3. I have discussed return to activity in detail.  4. Patient will see us back in 4 weeks.                                      5. Discussed case with PT.    All questions were answered, surgical technique was reviewed and interpreted, and patient should contact us with any questions or concerns in the interim.

## 2023-05-25 NOTE — PROGRESS NOTES
[facility]   Physical Therapy Treatment Note      Name: Ed Osei  Clinic Number: [id number]    Therapy Diagnosis:   Encounter Diagnosis   Name Primary?    Chronic left shoulder pain Yes     Physician: Sincere Beebe III, *    Visit Date: 5/25/2023    Physician Orders: PT Eval and Treat  Medical Diagnosis from Referral: M75.102 (ICD-10-CM) - Non-traumatic rotator cuff tear, left  Evaluation Date: 5/23/2023  Authorization Period Expiration: 6/23/23  Plan of Care Expiration: 10/29/23  Progress Note Due: 6/23/23  Visit # / Visits authorized: 1/ 20  Next MD visit: 6/19/23  FOTO: 1/3     Precautions: Standard   DOS 5/9/23 , POW 2     POSTOPERATIVE PLAN: We will follow the arthroscopic rotator cuff repair guidelines for a small size rotator cuff tear.  We discussed with the patient's family after surgery.  The patient will remain in a sling for 6 weeks.  PT to start at 1-2 weeks.     Cuff specific program:  Pendulum exercises and Codman's exercises in 5-7 days, protecting rotator cuff repair for 1 week by avoiding active motion program until 1 week.     PASSIVE ROM: ER side 30 degrees, Forward Flex 90 degrees, ABD - 60 degrees   Full AAROM/PROM starting at 5-7 days as tolerated      OPERATION:   left  1. Shoulder arthroscopic rotator cuff repair (upper 1/8 subscapularis) (CPT 85648) with [employer] ()  2. Shoulder arthroscopic biceps tenodesis (CPT 92046)  3. Shoulder arthroscopic subacromial decompression, bursectomy   4. Shoulder arthroscopic extensive debridement (anterior, posterior glenohumeral joint, subacromial space) (CPT 52229)  5. Shoulder arthroscopic labral debridement (CPT 66531)  6. Shoulder arthroscopic lysis of adhesions (CPT 02319)    PTA Visit #: 0/5     Time In: 3:30 pm  Time Out: 4:18 om  Total Billable Time: 48 minutes    Subjective     Pt reports: shoulder is feeling good. Saw Derek who told him that he is doing well. He had the bandages removed and was told  that he can shower now.   He was compliant with home exercise program.  Response to previous treatment: noelle eval well  Functional change: ongoing    Pain: 0/10  Location: left shoulder      Objective      DOS: 5/9/23 POW 2 day 2     Treatment     Ed received the treatments listed below:      therapeutic exercises to develop strength and ROM for 15 minutes including:  Elbow extension hangs - 5 mins   Elbow flexion AROM 2x10   Supine AAROM flexion hands clasped - 2x10     manual therapy techniques: Joint mobilizations and Soft tissue Mobilization were applied to the: L shoulder for 10 minutes, including:  Skilled PROM flexion and ER @20 degrees abduction   GHJ centering mob - Grade I-II     neuromuscular re-education activities to improve: Kinesthetic and Proprioception for 23 minutes. The following activities were included:  Scap squeeze - x40  Shrugs - x40  Shoulder rolls - x40  Toe touches - x20  Pendulums F/B, S/S, circles - 1 mins ea  Table slides scaption - 3 mins      therapeutic activities to improve functional performance for 00 minutes, including:    Patient Education and Home Exercises       Education provided:   - HEP, POC, answered patient questions      Written Home Exercises Provided: yes. Exercises were reviewed and Ed was able to demonstrate them prior to the end of the session.  Ed demonstrated good  understanding of the education provided. See EMR under Patient Instructions for exercises provided during therapy sessions    Assessment     Ed is doing very well with ROM and has very minimal resistance and pain with PROM or active assisted. Incisions are healing well. We was educated to perform exercises that were given slowly and also educated not to push to end range as he presents with minimal stiffness and does not need to aggressively push exercises. Pt able to recite precautions of avoiding AROM upon leaving the clinic.     Ed Is progressing well towards his goals.   Pt  prognosis is Excellent.     Pt will continue to benefit from skilled outpatient physical therapy to address the deficits listed in the problem list box on initial evaluation, provide pt/family education and to maximize pt's level of independence in the home and community environment.     Pt's spiritual, cultural and educational needs considered and pt agreeable to plan of care and goals.     Anticipated barriers to physical therapy: none    GOALS: Short Term Goals:  8 weeks  1.Report decreased shoulder pain < / =  4/10 at worst to increase tolerance for working  2. Increase PROM to WNL.   3. Pt will be able to tolerate MMT testing and measure at no lower that 4-/5 for flexor, abductors, external rotators and internal rotators.   4. Pt to tolerate HEP to improve ROM and independence with ADL's     Long Term Goals: 16  weeks  1.Report decreased shoulder pain  < / =  1 /10 at worst to increase tolerance for working.   2.Increase AROM to WNL.  3.Increase strength to >/= 4/+5 in areas of limitation to increase tolerance for ADL and work activities.  4. Pt goal: able to return to normal exercise routine with no limitations from shoulder.   5. Pt will have improved gcode of CJ (20-40% limited) on FOTO shoulder in order to demonstrate true functional improvement.    Plan     Cont to treat following precautions      Arturo Aleman PT

## 2023-05-30 ENCOUNTER — CLINICAL SUPPORT (OUTPATIENT)
Dept: REHABILITATION | Facility: HOSPITAL | Age: 43
End: 2023-05-30
Payer: COMMERCIAL

## 2023-05-30 DIAGNOSIS — G89.29 CHRONIC LEFT SHOULDER PAIN: Primary | ICD-10-CM

## 2023-05-30 DIAGNOSIS — M25.512 CHRONIC LEFT SHOULDER PAIN: Primary | ICD-10-CM

## 2023-05-30 PROCEDURE — 97112 NEUROMUSCULAR REEDUCATION: CPT | Mod: PN

## 2023-05-30 PROCEDURE — 97110 THERAPEUTIC EXERCISES: CPT | Mod: PN

## 2023-05-30 PROCEDURE — 97140 MANUAL THERAPY 1/> REGIONS: CPT | Mod: PN

## 2023-05-30 NOTE — PROGRESS NOTES
[facility]   Physical Therapy Treatment Note      Name: Ed Osei  Clinic Number: [id number]    Therapy Diagnosis:   Encounter Diagnosis   Name Primary?    Chronic left shoulder pain Yes       Physician: Sincere Beebe III, *    Visit Date: 5/30/2023    Physician Orders: PT Eval and Treat  Medical Diagnosis from Referral: M75.102 (ICD-10-CM) - Non-traumatic rotator cuff tear, left  Evaluation Date: 5/23/2023  Authorization Period Expiration: 6/23/23  Plan of Care Expiration: 10/29/23  Progress Note Due: 6/23/23  Visit # / Visits authorized: 2/ 20  Next MD visit: 6/19/23  FOTO: 1/3     Precautions: Standard   DOS 5/9/23 , POW 2     POSTOPERATIVE PLAN: We will follow the arthroscopic rotator cuff repair guidelines for a small size rotator cuff tear.  We discussed with the patient's family after surgery.  The patient will remain in a sling for 6 weeks.  PT to start at 1-2 weeks.     Cuff specific program:  Pendulum exercises and Codman's exercises in 5-7 days, protecting rotator cuff repair for 1 week by avoiding active motion program until 1 week.     PASSIVE ROM: ER side 30 degrees, Forward Flex 90 degrees, ABD - 60 degrees   Full AAROM/PROM starting at 5-7 days as tolerated      OPERATION:   left  1. Shoulder arthroscopic rotator cuff repair (upper 1/8 subscapularis) (CPT 27089) with [employer] ()  2. Shoulder arthroscopic biceps tenodesis (CPT 40696)  3. Shoulder arthroscopic subacromial decompression, bursectomy   4. Shoulder arthroscopic extensive debridement (anterior, posterior glenohumeral joint, subacromial space) (CPT 45089)  5. Shoulder arthroscopic labral debridement (CPT 76486)  6. Shoulder arthroscopic lysis of adhesions (CPT 54802)    PTA Visit #: 0/5     Time In: 11:00 am  Time Out: 11:59 om  Total Billable Time: 59 minutes    Subjective     Pt reports: shoulder is feeling good minimal pain just kind of annoying   He was compliant with home exercise  program.  Response to previous treatment: noelle eval well  Functional change: ongoing    Pain: 0/10  Location: left shoulder      Objective      DOS: 5/9/23: 3 weeks 0 days POD    Treatment     Ed received the treatments listed below:      therapeutic exercises to develop strength and ROM for 15 minutes including:  Supine dowel ER AAROM 3x10 5s holds  Supine dowel flexion AAROM 3x10 5s holds        Not today:  Elbow extension hangs - 5 mins   Elbow flexion AROM 2x10   Supine AAROM flexion hands clasped - 2x10     manual therapy techniques: Joint mobilizations and Soft tissue Mobilization were applied to the: L shoulder for 14 minutes, including:  Skilled PROM flexion and ER @20 degrees abduction   Grade II/III a/p inferior GH joint mobs     neuromuscular re-education activities to improve: Kinesthetic and Proprioception for 30 minutes. The following activities were included:  Side-lying dowel flexion for LT/SA retraining 3x10   Standing dowel landmine press 3x10  GTB rows 3x10  YTB shoulder extension 3x10  Submax isometrics at wall: flexion/ER/IR 5s holds x 10 each        Not today:  Toe touches - x20  Pendulums F/B, S/S, circles - 1 mins ea  Table slides scaption - 3 mins      therapeutic activities to improve functional performance for 00 minutes, including:  Next session: UBE      Patient Education and Home Exercises       Education provided:   - HEP, POC, answered patient questions      Written Home Exercises Provided: yes. Exercises were reviewed and Ed was able to demonstrate them prior to the end of the session.  Ed demonstrated good  understanding of the education provided. See EMR under Patient Instructions for exercises provided during therapy sessions    Assessment   Patient completed session as noted above with continued improvement noted in irritability levels upon arrival. Progressed AAROM, periscapular and cuff activation during session today with no complaints reported. Continues to demo  restrictions into end range FE and ER as expected at this stage of rehab. Overall progressing well at this time and would continue to benefit from skilled PT services to address remaining ROM restrictions and strength deficits to normalize LUE function. Updated HEP to reflect activities performed during session today    Ed Is progressing well towards his goals.   Pt prognosis is Excellent.     Pt will continue to benefit from skilled outpatient physical therapy to address the deficits listed in the problem list box on initial evaluation, provide pt/family education and to maximize pt's level of independence in the home and community environment.     Pt's spiritual, cultural and educational needs considered and pt agreeable to plan of care and goals.     Anticipated barriers to physical therapy: none    GOALS: Short Term Goals:  8 weeks  1.Report decreased shoulder pain < / =  4/10 at worst to increase tolerance for working  2. Increase PROM to WNL.   3. Pt will be able to tolerate MMT testing and measure at no lower that 4-/5 for flexor, abductors, external rotators and internal rotators.   4. Pt to tolerate HEP to improve ROM and independence with ADL's     Long Term Goals: 16  weeks  1.Report decreased shoulder pain  < / =  1 /10 at worst to increase tolerance for working.   2.Increase AROM to WNL.  3.Increase strength to >/= 4/+5 in areas of limitation to increase tolerance for ADL and work activities.  4. Pt goal: able to return to normal exercise routine with no limitations from shoulder.   5. Pt will have improved gcode of CJ (20-40% limited) on FOTO shoulder in order to demonstrate true functional improvement.    Plan     Cont to treat following precautions      ANILA BUTLER, PT

## 2023-06-06 ENCOUNTER — CLINICAL SUPPORT (OUTPATIENT)
Dept: REHABILITATION | Facility: HOSPITAL | Age: 43
End: 2023-06-06
Payer: COMMERCIAL

## 2023-06-06 DIAGNOSIS — M25.512 CHRONIC LEFT SHOULDER PAIN: Primary | ICD-10-CM

## 2023-06-06 DIAGNOSIS — G89.29 CHRONIC LEFT SHOULDER PAIN: Primary | ICD-10-CM

## 2023-06-06 PROCEDURE — 97140 MANUAL THERAPY 1/> REGIONS: CPT | Mod: PN

## 2023-06-06 PROCEDURE — 97112 NEUROMUSCULAR REEDUCATION: CPT | Mod: PN

## 2023-06-06 PROCEDURE — 97110 THERAPEUTIC EXERCISES: CPT | Mod: PN

## 2023-06-06 NOTE — PROGRESS NOTES
"[facility]   Physical Therapy Treatment Note      Name: Ed Osei  Clinic Number: [id number]    Therapy Diagnosis:   Encounter Diagnosis   Name Primary?    Chronic left shoulder pain Yes     Physician: Sincere Beebe III, *    Visit Date: 6/6/2023    Physician Orders: PT Eval and Treat  Medical Diagnosis from Referral: M75.102 (ICD-10-CM) - Non-traumatic rotator cuff tear, left  Evaluation Date: 5/23/2023  Authorization Period Expiration: 6/23/23  Plan of Care Expiration: 10/29/23  Progress Note Due: 6/23/23  Visit # / Visits authorized: 3/ 20  Next MD visit: 6/19/23  FOTO: 1/3     Precautions: Standard   DOS 5/9/23 , POW 2     POSTOPERATIVE PLAN: We will follow the arthroscopic rotator cuff repair guidelines for a small size rotator cuff tear.  We discussed with the patient's family after surgery.  The patient will remain in a sling for 6 weeks.  PT to start at 1-2 weeks.     Cuff specific program:  Pendulum exercises and Codman's exercises in 5-7 days, protecting rotator cuff repair for 1 week by avoiding active motion program until 1 week.     PASSIVE ROM: ER side 30 degrees, Forward Flex 90 degrees, ABD - 60 degrees   Full AAROM/PROM starting at 5-7 days as tolerated      OPERATION:   left  1. Shoulder arthroscopic rotator cuff repair (upper 1/8 subscapularis) (CPT 75446) with [employer] ()  2. Shoulder arthroscopic biceps tenodesis (CPT 29170)  3. Shoulder arthroscopic subacromial decompression, bursectomy   4. Shoulder arthroscopic extensive debridement (anterior, posterior glenohumeral joint, subacromial space) (CPT 10904)  5. Shoulder arthroscopic labral debridement (CPT 90668)  6. Shoulder arthroscopic lysis of adhesions (CPT 25538)    PTA Visit #: 0/5     Time In: 2:15 pm  Time Out: 3:15 pm  Total Billable Time: 60 minutes    Subjective     Pt reports: "It's getting better every day"  He was compliant with home exercise program.  Response to previous treatment: noelle " eval well  Functional change: ongoing    Pain: 0/10  Location: left shoulder      Objective      DOS: 5/9/23: 4 weeks 1 days POD    Treatment     Ed received the treatments listed below:      therapeutic exercises to develop strength and ROM for 15 minutes including:  Supine dowel ER AAROM 3x10 5s holds  Supine dowel flexion AAROM 3x10 5s holds      Not today:  Elbow extension hangs - 5 mins   Elbow flexion AROM 2x10   Supine AAROM flexion hands clasped - 2x10     manual therapy techniques: Joint mobilizations and Soft tissue Mobilization were applied to the: L shoulder for 15 minutes, including:  Skilled PROM flexion and ER @20 degrees abduction   Grade II/III a/p inferior GH joint mobs     neuromuscular re-education activities to improve: Kinesthetic and Proprioception for 30 minutes. The following activities were included:  Side-lying dowel flexion for LT/SA retraining 3x10   Standing dowel landmine press 3x10  GTB rows 3x10  YTB shoulder extension 3x10  Submax isometrics at wall: flexion/ER/IR 5s holds x 10 each  +Prone Rows 0# 3x10         Not today:  Toe touches - x20  Pendulums F/B, S/S, circles - 1 mins ea  Table slides scaption - 3 mins      therapeutic activities to improve functional performance for 00 minutes, including:  UBE next visit      Patient Education and Home Exercises       Education provided:   - HEP, POC, answered patient questions      Written Home Exercises Provided: yes. Exercises were reviewed and Ed was able to demonstrate them prior to the end of the session.  Ed demonstrated good  understanding of the education provided. See EMR under Patient Instructions for exercises provided during therapy sessions    Assessment   Patient continues to progress well with ROM. Flexion and ER and still limited at this time but at an appropriate level per POW 4. Rodolfo new addition of prone rows well but required cuing for proper periscap activation. Responding well to the current plan.      Ed Is progressing well towards his goals.   Pt prognosis is Excellent.     Pt will continue to benefit from skilled outpatient physical therapy to address the deficits listed in the problem list box on initial evaluation, provide pt/family education and to maximize pt's level of independence in the home and community environment.     Pt's spiritual, cultural and educational needs considered and pt agreeable to plan of care and goals.     Anticipated barriers to physical therapy: none    GOALS: Short Term Goals:  8 weeks  1.Report decreased shoulder pain < / =  4/10 at worst to increase tolerance for working  2. Increase PROM to WNL.   3. Pt will be able to tolerate MMT testing and measure at no lower that 4-/5 for flexor, abductors, external rotators and internal rotators.   4. Pt to tolerate HEP to improve ROM and independence with ADL's     Long Term Goals: 16  weeks  1.Report decreased shoulder pain  < / =  1 /10 at worst to increase tolerance for working.   2.Increase AROM to WNL.  3.Increase strength to >/= 4/+5 in areas of limitation to increase tolerance for ADL and work activities.  4. Pt goal: able to return to normal exercise routine with no limitations from shoulder.   5. Pt will have improved gcode of CJ (20-40% limited) on FOTO shoulder in order to demonstrate true functional improvement.    Plan     Cont to treat following precautions      Arturo Aleman PT

## 2023-06-08 ENCOUNTER — PATIENT MESSAGE (OUTPATIENT)
Dept: SPORTS MEDICINE | Facility: CLINIC | Age: 43
End: 2023-06-08
Payer: COMMERCIAL

## 2023-06-13 ENCOUNTER — CLINICAL SUPPORT (OUTPATIENT)
Dept: REHABILITATION | Facility: HOSPITAL | Age: 43
End: 2023-06-13
Payer: COMMERCIAL

## 2023-06-13 DIAGNOSIS — G89.29 CHRONIC LEFT SHOULDER PAIN: Primary | ICD-10-CM

## 2023-06-13 DIAGNOSIS — M25.512 CHRONIC LEFT SHOULDER PAIN: Primary | ICD-10-CM

## 2023-06-13 PROCEDURE — 97110 THERAPEUTIC EXERCISES: CPT | Mod: PN

## 2023-06-13 PROCEDURE — 97112 NEUROMUSCULAR REEDUCATION: CPT | Mod: PN

## 2023-06-13 PROCEDURE — 97530 THERAPEUTIC ACTIVITIES: CPT | Mod: PN

## 2023-06-13 NOTE — PROGRESS NOTES
[facility]   Physical Therapy Treatment Note      Name: Ed Osei  Clinic Number: [id number]    Therapy Diagnosis:   Encounter Diagnosis   Name Primary?    Chronic left shoulder pain Yes     Physician: Sincere Beebe III, *    Visit Date: 6/13/2023    Physician Orders: PT Eval and Treat  Medical Diagnosis from Referral: M75.102 (ICD-10-CM) - Non-traumatic rotator cuff tear, left  Evaluation Date: 5/23/2023  Authorization Period Expiration: 6/23/23  Plan of Care Expiration: 10/29/23  Progress Note Due: 6/23/23  Visit # / Visits authorized: 4/ 20  Next MD visit: 6/19/23  FOTO: 1/3     Precautions: Standard   DOS 5/9/23 , POW 2     POSTOPERATIVE PLAN: We will follow the arthroscopic rotator cuff repair guidelines for a small size rotator cuff tear.  We discussed with the patient's family after surgery.  The patient will remain in a sling for 6 weeks.  PT to start at 1-2 weeks.     Cuff specific program:  Pendulum exercises and Codman's exercises in 5-7 days, protecting rotator cuff repair for 1 week by avoiding active motion program until 1 week.     PASSIVE ROM: ER side 30 degrees, Forward Flex 90 degrees, ABD - 60 degrees   Full AAROM/PROM starting at 5-7 days as tolerated      OPERATION:   left  1. Shoulder arthroscopic rotator cuff repair (upper 1/8 subscapularis) (CPT 10938) with CuffMend ()  2. Shoulder arthroscopic biceps tenodesis (CPT 77484)  3. Shoulder arthroscopic subacromial decompression, bursectomy   4. Shoulder arthroscopic extensive debridement (anterior, posterior glenohumeral joint, subacromial space) (CPT 64419)  5. Shoulder arthroscopic labral debridement (CPT 70722)  6. Shoulder arthroscopic lysis of adhesions (CPT 67093)    PTA Visit #: 0/5     Time In: 1:25 pm  Time Out: 3:15 pm  Total Billable Time: 60 minutes    Subjective     Pt reports: shoulder feels pretty good my elbow is still a little swollen though  He was compliant with home exercise  program.  Response to previous treatment: noelle laraal well  Functional change: ongoing    Pain: 0/10  Location: left shoulder      Objective      DOS: 5/9/23: 5 weeks 0 days POD    Treatment     Shoulder Passive Range of Motion:   Shoulder Right Left*   Flexion   170 160   ER at 0   N/t 70   ER at 90   N/t 75   IR at 90   N/t 60           Ed received the treatments listed below:      therapeutic exercises to develop strength and ROM for 15 minutes including:  Supine dowel ER AAROM 3x10 5s holds  Supine dowel flexion AAROM 3x10 5s holds  SL ER 3x10      Not today:  Supine AAROM flexion hands clasped - 2x10     manual therapy techniques: Joint mobilizations and Soft tissue Mobilization were applied to the: L shoulder for 10 minutes, including:  Skilled PROM flexion and ER @20 degrees abduction   Grade II/III a/p inferior GH joint mobs     neuromuscular re-education activities to improve: Kinesthetic and Proprioception for 27 minutes. The following activities were included:  Prone row 3x10  Prone extension 3x10  RTB ER walkout at 0 3x6  RTB IR walkout at 0 3x6  Serratus wall slide 3x10  Wall ball RS at 90 deg FE: up/down, side/side, CW, CCW x10 each        Not today:  Side-lying dowel flexion for LT/SA retraining 3x10   Standing dowel landmine press 3x10  GTB rows 3x10  YTB shoulder extension 3x10             therapeutic activities to improve functional performance for 08 minutes, including:  UBE 4' forward, 4' backwards      Patient Education and Home Exercises       Education provided:   - HEP, POC, answered patient questions      Written Home Exercises Provided: yes. Exercises were reviewed and Ed was able to demonstrate them prior to the end of the session.  Ed demonstrated good  understanding of the education provided. See EMR under Patient Instructions for exercises provided during therapy sessions    Assessment   Patient completed session as noted above with progressions made towards isometric cuff  loading and periscapular activation/strengthening. Patient with sig fatigue reported achieving therapeutic effect but able to complete entirety of session with no complaints of pain. PROM looks good at this time with restrictions noted at end range flexion/ER as expected. Will continue to focus on normalizing P/AROM and progressive cuff loading per protocol      Ed Is progressing well towards his goals.   Pt prognosis is Excellent.     Pt will continue to benefit from skilled outpatient physical therapy to address the deficits listed in the problem list box on initial evaluation, provide pt/family education and to maximize pt's level of independence in the home and community environment.     Pt's spiritual, cultural and educational needs considered and pt agreeable to plan of care and goals.     Anticipated barriers to physical therapy: none    GOALS: Short Term Goals:  8 weeks  1.Report decreased shoulder pain < / =  4/10 at worst to increase tolerance for working  2. Increase PROM to WNL.   3. Pt will be able to tolerate MMT testing and measure at no lower that 4-/5 for flexor, abductors, external rotators and internal rotators.   4. Pt to tolerate HEP to improve ROM and independence with ADL's     Long Term Goals: 16  weeks  1.Report decreased shoulder pain  < / =  1 /10 at worst to increase tolerance for working.   2.Increase AROM to WNL.  3.Increase strength to >/= 4/+5 in areas of limitation to increase tolerance for ADL and work activities.  4. Pt goal: able to return to normal exercise routine with no limitations from shoulder.   5. Pt will have improved gcode of CJ (20-40% limited) on FOTO shoulder in order to demonstrate true functional improvement.    Plan     Cont to treat following precautions      ANILA BUTLER, PT, DPT

## 2023-06-19 ENCOUNTER — OFFICE VISIT (OUTPATIENT)
Dept: SPORTS MEDICINE | Facility: CLINIC | Age: 43
End: 2023-06-19
Payer: COMMERCIAL

## 2023-06-19 VITALS
HEART RATE: 65 BPM | HEIGHT: 66 IN | SYSTOLIC BLOOD PRESSURE: 98 MMHG | WEIGHT: 160 LBS | BODY MASS INDEX: 25.71 KG/M2 | DIASTOLIC BLOOD PRESSURE: 68 MMHG

## 2023-06-19 DIAGNOSIS — Z98.890 S/P SHOULDER SURGERY: Primary | ICD-10-CM

## 2023-06-19 PROCEDURE — 99024 POSTOP FOLLOW-UP VISIT: CPT | Mod: S$GLB,,, | Performed by: ORTHOPAEDIC SURGERY

## 2023-06-19 PROCEDURE — 3074F PR MOST RECENT SYSTOLIC BLOOD PRESSURE < 130 MM HG: ICD-10-PCS | Mod: CPTII,S$GLB,, | Performed by: ORTHOPAEDIC SURGERY

## 2023-06-19 PROCEDURE — 1159F PR MEDICATION LIST DOCUMENTED IN MEDICAL RECORD: ICD-10-PCS | Mod: CPTII,S$GLB,, | Performed by: ORTHOPAEDIC SURGERY

## 2023-06-19 PROCEDURE — 3074F SYST BP LT 130 MM HG: CPT | Mod: CPTII,S$GLB,, | Performed by: ORTHOPAEDIC SURGERY

## 2023-06-19 PROCEDURE — 3078F PR MOST RECENT DIASTOLIC BLOOD PRESSURE < 80 MM HG: ICD-10-PCS | Mod: CPTII,S$GLB,, | Performed by: ORTHOPAEDIC SURGERY

## 2023-06-19 PROCEDURE — 99999 PR PBB SHADOW E&M-EST. PATIENT-LVL III: ICD-10-PCS | Mod: PBBFAC,,, | Performed by: ORTHOPAEDIC SURGERY

## 2023-06-19 PROCEDURE — 3008F BODY MASS INDEX DOCD: CPT | Mod: CPTII,S$GLB,, | Performed by: ORTHOPAEDIC SURGERY

## 2023-06-19 PROCEDURE — 1159F MED LIST DOCD IN RCRD: CPT | Mod: CPTII,S$GLB,, | Performed by: ORTHOPAEDIC SURGERY

## 2023-06-19 PROCEDURE — 99024 PR POST-OP FOLLOW-UP VISIT: ICD-10-PCS | Mod: S$GLB,,, | Performed by: ORTHOPAEDIC SURGERY

## 2023-06-19 PROCEDURE — 3008F PR BODY MASS INDEX (BMI) DOCUMENTED: ICD-10-PCS | Mod: CPTII,S$GLB,, | Performed by: ORTHOPAEDIC SURGERY

## 2023-06-19 PROCEDURE — 3044F PR MOST RECENT HEMOGLOBIN A1C LEVEL <7.0%: ICD-10-PCS | Mod: CPTII,S$GLB,, | Performed by: ORTHOPAEDIC SURGERY

## 2023-06-19 PROCEDURE — 3078F DIAST BP <80 MM HG: CPT | Mod: CPTII,S$GLB,, | Performed by: ORTHOPAEDIC SURGERY

## 2023-06-19 PROCEDURE — 99999 PR PBB SHADOW E&M-EST. PATIENT-LVL III: CPT | Mod: PBBFAC,,, | Performed by: ORTHOPAEDIC SURGERY

## 2023-06-19 PROCEDURE — 3044F HG A1C LEVEL LT 7.0%: CPT | Mod: CPTII,S$GLB,, | Performed by: ORTHOPAEDIC SURGERY

## 2023-06-19 NOTE — PROGRESS NOTES
Chief Complaint: Left shoulder pain    HISTORY OF PRESENT ILLNESS:   Pt is here today for post-operative followup of shoulder arthroscopy.  he is doing well.  We have reviewed patient's findings and discussed plan of care and future treatment options.      Tolerating pain well. Working with [facility] in PT and doing well with that  Wearing sling which is in place.  No other issues reported.     Pain at a 2/10.     SANE: 50    DATE OF PROCEDURE: 05/09/2023     SURGEON: Tori Davis M.D.  OPERATION:   left  1. Shoulder arthroscopic rotator cuff repair (upper 1/8 subscapularis) (CPT 32798) with CuffMend ()  2. Shoulder arthroscopic biceps tenodesis (CPT 58426)  3. Shoulder arthroscopic subacromial decompression, bursectomy   4. Shoulder arthroscopic extensive debridement (anterior, posterior glenohumeral joint, subacromial space) (CPT 98272)  5. Shoulder arthroscopic labral debridement (CPT 45304)  6. Shoulder arthroscopic lysis of adhesions (CPT 19144)                                                                                  PHYSICAL EXAMINATION:     Incision sites healed well  No evidence of any erythema, infection or induration  elbow Range of motion full   Minimal effusion  2+ Radial pulses  No swelling  ROM: FF-120  ER-35  IR- L3                                                                               ASSESSMENT:                                                                                                                                               1. Status post above, doing well.                                                                                                                               PLAN:                                                                                                                                                     1. PT to continue  2. Emphasized scapular function.  3. I have discussed return to activity in detail.  4. Patient will see us back in 6 weeks.      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 Discussed case with PT.    All questions were answered, surgical technique was reviewed and interpreted, and patient should contact us with any questions or concerns in the interim.

## 2023-06-20 ENCOUNTER — CLINICAL SUPPORT (OUTPATIENT)
Dept: REHABILITATION | Facility: HOSPITAL | Age: 43
End: 2023-06-20
Payer: COMMERCIAL

## 2023-06-20 DIAGNOSIS — G89.29 CHRONIC LEFT SHOULDER PAIN: Primary | ICD-10-CM

## 2023-06-20 DIAGNOSIS — M25.512 CHRONIC LEFT SHOULDER PAIN: Primary | ICD-10-CM

## 2023-06-20 PROCEDURE — 97140 MANUAL THERAPY 1/> REGIONS: CPT | Mod: PN

## 2023-06-20 PROCEDURE — 97112 NEUROMUSCULAR REEDUCATION: CPT | Mod: PN

## 2023-06-20 PROCEDURE — 97110 THERAPEUTIC EXERCISES: CPT | Mod: PN

## 2023-06-20 PROCEDURE — 97530 THERAPEUTIC ACTIVITIES: CPT | Mod: PN

## 2023-06-20 NOTE — PROGRESS NOTES
[facility]   Physical Therapy Treatment Note      Name: Ed Osei  Clinic Number: [id number]    Therapy Diagnosis:   Encounter Diagnosis   Name Primary?    Chronic left shoulder pain Yes       Physician: Sincere Beebe III, *    Visit Date: 6/20/2023    Physician Orders: PT Eval and Treat  Medical Diagnosis from Referral: M75.102 (ICD-10-CM) - Non-traumatic rotator cuff tear, left  Evaluation Date: 5/23/2023  Authorization Period Expiration: 6/23/23  Plan of Care Expiration: 10/29/23  Progress Note Due: 6/23/23  Visit # / Visits authorized: 5/ 20  Next MD visit: 6/19/23  FOTO: 1/3     Precautions: Standard   DOS 5/9/23 , POW 2     POSTOPERATIVE PLAN: We will follow the arthroscopic rotator cuff repair guidelines for a small size rotator cuff tear.  We discussed with the patient's family after surgery.  The patient will remain in a sling for 6 weeks.  PT to start at 1-2 weeks.     Cuff specific program:  Pendulum exercises and Codman's exercises in 5-7 days, protecting rotator cuff repair for 1 week by avoiding active motion program until 1 week.     PASSIVE ROM: ER side 30 degrees, Forward Flex 90 degrees, ABD - 60 degrees   Full AAROM/PROM starting at 5-7 days as tolerated      OPERATION:   left  1. Shoulder arthroscopic rotator cuff repair (upper 1/8 subscapularis) (CPT 73614) with CuffMend ()  2. Shoulder arthroscopic biceps tenodesis (CPT 52135)  3. Shoulder arthroscopic subacromial decompression, bursectomy   4. Shoulder arthroscopic extensive debridement (anterior, posterior glenohumeral joint, subacromial space) (CPT 31048)  5. Shoulder arthroscopic labral debridement (CPT 23028)  6. 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   He was compliant with home exercise program.  Response to previous  treatment: noelle eval well  Functional change: ongoing    Pain: 0/10  Location: left shoulder      Objective      DOS: 5/9/23: 6 weeks 0 days POD    Treatment     Shoulder Passive Range of Motion:   Shoulder Right Left*   Flexion   170 160   ER at 0   N/t 70   ER at 90   N/t 75   IR at 90   N/t 60           Ed received the treatments listed below:      therapeutic exercises to develop strength and ROM for 15 minutes including:  Supine dowel ER AAROM 3x10 5s holds  Supine dowel flexion AAROM 3x10 5s holds  SL ER 3x10  SL Abd (short lever) 2x10     manual therapy techniques: Joint mobilizations and Soft tissue Mobilization were applied to the: L shoulder for 10 minutes, including:  Skilled PROM flexion and ER @20 degrees abduction   Grade II/III a/p inferior GH joint mobs     neuromuscular re-education activities to improve: Kinesthetic and Proprioception for 27 minutes. The following activities were included:  Standing row RTB 3x10  Standing extension RTB 3x10  Prone extension 3x10   Prone row 3x10   RTB ER walkout at 0 3x6  RTB IR walkout at 0 3x6  Serratus wall slide 3x10  Wall ball RS at 90 deg FE: up/down, side/side, CW, CCW x10 each        Not today:  Side-lying dowel flexion for LT/SA retraining 3x10   Standing dowel landmine press 3x10  GTB rows 3x10  YTB shoulder extension 3x10             therapeutic activities to improve functional performance for 08 minutes, including:  UBE 4' forward, 4' backwards      Patient Education and Home Exercises       Education provided:   - HEP, POC, answered patient questions      Written Home Exercises Provided: yes. Exercises were reviewed and Ed was able to demonstrate them prior to the end of the session.  Ed demonstrated good  understanding of the education provided. See EMR under Patient Instructions for exercises provided during therapy sessions    Assessment   Ed continues to respond well to the current plan. Isometric cuff strengthening is getting less  difficult for him. He continues demonstrate only minimal ROM deficits which are steadily improving. Will cont to benefit from skilled pt to address ROM deficits and cuff weakness.     Ed Is progressing well towards his goals.   Pt prognosis is Excellent.     Pt will continue to benefit from skilled outpatient physical therapy to address the deficits listed in the problem list box on initial evaluation, provide pt/family education and to maximize pt's level of independence in the home and community environment.     Pt's spiritual, cultural and educational needs considered and pt agreeable to plan of care and goals.     Anticipated barriers to physical therapy: none    GOALS: Short Term Goals:  8 weeks  1.Report decreased shoulder pain < / =  4/10 at worst to increase tolerance for working  2. Increase PROM to WNL.   3. Pt will be able to tolerate MMT testing and measure at no lower that 4-/5 for flexor, abductors, external rotators and internal rotators.   4. Pt to tolerate HEP to improve ROM and independence with ADL's     Long Term Goals: 16  weeks  1.Report decreased shoulder pain  < / =  1 /10 at worst to increase tolerance for working.   2.Increase AROM to WNL.  3.Increase strength to >/= 4/+5 in areas of limitation to increase tolerance for ADL and work activities.  4. Pt goal: able to return to normal exercise routine with no limitations from shoulder.   5. Pt will have improved gcode of CJ (20-40% limited) on FOTO shoulder in order to demonstrate true functional improvement.    Plan     Cont to treat following precautions      Arturo Aleman, PT, DPT

## 2023-06-27 ENCOUNTER — CLINICAL SUPPORT (OUTPATIENT)
Dept: REHABILITATION | Facility: HOSPITAL | Age: 43
End: 2023-06-27
Payer: COMMERCIAL

## 2023-06-27 DIAGNOSIS — G89.29 CHRONIC LEFT SHOULDER PAIN: Primary | ICD-10-CM

## 2023-06-27 DIAGNOSIS — M25.512 CHRONIC LEFT SHOULDER PAIN: Primary | ICD-10-CM

## 2023-06-27 PROCEDURE — 97530 THERAPEUTIC ACTIVITIES: CPT | Mod: PN

## 2023-06-27 PROCEDURE — 97110 THERAPEUTIC EXERCISES: CPT | Mod: PN

## 2023-06-27 PROCEDURE — 97112 NEUROMUSCULAR REEDUCATION: CPT | Mod: PN

## 2023-06-27 NOTE — PROGRESS NOTES
[facility]   Physical Therapy Treatment Note      Name: Ed Osei  Clinic Number: [id number]    Therapy Diagnosis:   Encounter Diagnosis   Name Primary?    Chronic left shoulder pain Yes     Physician: Sincere Beebe III, *    Visit Date: 6/27/2023    Physician Orders: PT Eval and Treat  Medical Diagnosis from Referral: M75.102 (ICD-10-CM) - Non-traumatic rotator cuff tear, left  Evaluation Date: 5/23/2023  Authorization Period Expiration: 6/23/23  Plan of Care Expiration: 10/29/23  Progress Note Due: 6/23/23  Visit # / Visits authorized: 6/ 20  Next MD visit: 6/19/23  FOTO: 1/3     Precautions: Standard   DOS 5/9/23 , POW 2     POSTOPERATIVE PLAN: We will follow the arthroscopic rotator cuff repair guidelines for a small size rotator cuff tear.  We discussed with the patient's family after surgery.  The patient will remain in a sling for 6 weeks.  PT to start at 1-2 weeks.     Cuff specific program:  Pendulum exercises and Codman's exercises in 5-7 days, protecting rotator cuff repair for 1 week by avoiding active motion program until 1 week.     PASSIVE ROM: ER side 30 degrees, Forward Flex 90 degrees, ABD - 60 degrees   Full AAROM/PROM starting at 5-7 days as tolerated      OPERATION:   left  1. Shoulder arthroscopic rotator cuff repair (upper 1/8 subscapularis) (CPT 42844) with CuffMend ()  2. Shoulder arthroscopic biceps tenodesis (CPT 28738)  3. Shoulder arthroscopic subacromial decompression, bursectomy   4. Shoulder arthroscopic extensive debridement (anterior, posterior glenohumeral joint, subacromial space) (CPT 55756)  5. Shoulder arthroscopic labral debridement (CPT 04817)  6. Shoulder arthroscopic lysis of adhesions (CPT 85211)    PTA Visit #: 0/5     Time In: 2:30 pm  Time Out: 3:30 pm  Total Billable Time: 60 minutes    Subjective     Pt reports: been feeling really good no complaints  He was compliant with home exercise program.  Response to previous  treatment: noelle eval well  Functional change: ongoing    Pain: 0/10  Location: left shoulder      Objective      DOS: 5/9/23: 6 weeks 0 days POD    Treatment     Shoulder Passive Range of Motion:   Shoulder Right Left*   Flexion   170 160   ER at 0   N/t 70   ER at 90   N/t 75   IR at 90   N/t 60           Ed received the treatments listed below:      therapeutic exercises to develop strength and ROM for 15 minutes including:  Supine dowel ER AAROM 3x10 5s holds  Supine dowel flexion AAROM 3x10 5s holds  SL ER 3x10 1#  Seated ER at 90/90 supported on bolster 3x10    manual therapy techniques: Joint mobilizations and Soft tissue Mobilization were applied to the: L shoulder for 10 minutes, including:  Skilled PROM flexion and ER @20 degrees abduction   Grade II/III a/p inferior GH joint mobs     neuromuscular re-education activities to improve: Kinesthetic and Proprioception for 27 minutes. The following activities were included:  Prone extension 3x10   Prone row 3x10 5#  RTB ER walkout at 90 deg flexion 3x6  RTB IR walkout at 90 deg 3x6  Serratus wall slide 3x10  Wall ball RS at 90 deg FE: up/down, side/side, CW, CCW x10 each  Standing scaption raise to 90 1# with contralat iso hold 10/10/10        Not today:  Standing row RTB 3x10  Standing extension RTB 3x10  Side-lying dowel flexion for LT/SA retraining 3x10   Standing dowel landmine press 3x10  GTB rows 3x10  YTB shoulder extension 3x10             therapeutic activities to improve functional performance for 08 minutes, including:  UBE 4' forward, 4' backwards to improve CV endurance and tissue extensibility      Patient Education and Home Exercises       Education provided:   - HEP, POC, answered patient questions      Written Home Exercises Provided: yes. Exercises were reviewed and Ed was able to demonstrate them prior to the end of the session.  Ed demonstrated good  understanding of the education provided. See EMR under Patient Instructions for  exercises provided during therapy sessions    Assessment   Ed completed session as noted above with progressions made to isometric cuff activation at increased angles of FE. Patient reported sig fatigue however no complaints of pain. ROM is satisfactory at this stage of rehab continues to demo restrictions at end range as expected. Will continue to progress as tolerated to normalize ROM and cuff and periscapular stability    Ed Is progressing well towards his goals.   Pt prognosis is Excellent.     Pt will continue to benefit from skilled outpatient physical therapy to address the deficits listed in the problem list box on initial evaluation, provide pt/family education and to maximize pt's level of independence in the home and community environment.     Pt's spiritual, cultural and educational needs considered and pt agreeable to plan of care and goals.     Anticipated barriers to physical therapy: none    GOALS: Short Term Goals:  8 weeks  1.Report decreased shoulder pain < / =  4/10 at worst to increase tolerance for working  2. Increase PROM to WNL.   3. Pt will be able to tolerate MMT testing and measure at no lower that 4-/5 for flexor, abductors, external rotators and internal rotators.   4. Pt to tolerate HEP to improve ROM and independence with ADL's     Long Term Goals: 16  weeks  1.Report decreased shoulder pain  < / =  1 /10 at worst to increase tolerance for working.   2.Increase AROM to WNL.  3.Increase strength to >/= 4/+5 in areas of limitation to increase tolerance for ADL and work activities.  4. Pt goal: able to return to normal exercise routine with no limitations from shoulder.   5. Pt will have improved gcode of CJ (20-40% limited) on FOTO shoulder in order to demonstrate true functional improvement.    Plan     Cont to treat following precautions      ANILA BUTLER, PT, DPT

## 2023-07-06 ENCOUNTER — CLINICAL SUPPORT (OUTPATIENT)
Dept: REHABILITATION | Facility: HOSPITAL | Age: 43
End: 2023-07-06
Payer: COMMERCIAL

## 2023-07-06 ENCOUNTER — PATIENT MESSAGE (OUTPATIENT)
Dept: SPORTS MEDICINE | Facility: CLINIC | Age: 43
End: 2023-07-06
Payer: COMMERCIAL

## 2023-07-06 DIAGNOSIS — G89.29 CHRONIC LEFT SHOULDER PAIN: Primary | ICD-10-CM

## 2023-07-06 DIAGNOSIS — M25.512 CHRONIC LEFT SHOULDER PAIN: Primary | ICD-10-CM

## 2023-07-06 PROCEDURE — 97110 THERAPEUTIC EXERCISES: CPT | Mod: PN

## 2023-07-06 PROCEDURE — 97140 MANUAL THERAPY 1/> REGIONS: CPT | Mod: PN

## 2023-07-06 PROCEDURE — 97112 NEUROMUSCULAR REEDUCATION: CPT | Mod: PN

## 2023-07-06 NOTE — PROGRESS NOTES
[facility]   Physical Therapy Treatment Note      Name: Ed Osei  Clinic Number: [id number]    Therapy Diagnosis:   Encounter Diagnosis   Name Primary?    Chronic left shoulder pain Yes     Physician: Sincere Beebe III, *    Visit Date: 7/6/2023    Physician Orders: PT Eval and Treat  Medical Diagnosis from Referral: M75.102 (ICD-10-CM) - Non-traumatic rotator cuff tear, left  Evaluation Date: 5/23/2023  Authorization Period Expiration: 6/23/23  Plan of Care Expiration: 10/29/23  Progress Note Due: 6/23/23  Visit # / Visits authorized: 6/ 20  Next MD visit: 6/19/23  FOTO: 1/3     Precautions: Standard   DOS 5/9/23 , POW 2     POSTOPERATIVE PLAN: We will follow the arthroscopic rotator cuff repair guidelines for a small size rotator cuff tear.  We discussed with the patient's family after surgery.  The patient will remain in a sling for 6 weeks.  PT to start at 1-2 weeks.     Cuff specific program:  Pendulum exercises and Codman's exercises in 5-7 days, protecting rotator cuff repair for 1 week by avoiding active motion program until 1 week.     PASSIVE ROM: ER side 30 degrees, Forward Flex 90 degrees, ABD - 60 degrees   Full AAROM/PROM starting at 5-7 days as tolerated      OPERATION:   left  1. Shoulder arthroscopic rotator cuff repair (upper 1/8 subscapularis) (CPT 17206) with CuffMend ()  2. Shoulder arthroscopic biceps tenodesis (CPT 48986)  3. Shoulder arthroscopic subacromial decompression, bursectomy   4. Shoulder arthroscopic extensive debridement (anterior, posterior glenohumeral joint, subacromial space) (CPT 16205)  5. Shoulder arthroscopic labral debridement (CPT 30193)  6. Shoulder arthroscopic lysis of adhesions (CPT 70487)    PTA Visit #: 0/5     Time In: 2:30 pm  Time Out: 3:30 pm  Total Billable Time: 60 minutes    Subjective     Pt reports: been feeling really good no complaints  He was compliant with home exercise program.  Response to previous  treatment: noelle eval well  Functional change: ongoing    Pain: 0/10  Location: left shoulder      Objective      DOS: 5/9/23: 6 weeks 0 days POD    Treatment     Shoulder Passive Range of Motion:   Shoulder Right Left*   Flexion   170 160   ER at 0   N/t 70   ER at 90   N/t 75   IR at 90   N/t 60           Ed received the treatments listed below:      therapeutic exercises to develop strength and ROM for 15 minutes including:  Supine dowel ER AAROM 3x10 5s holds  Supine dowel flexion AAROM 3x10 5s holds  SL ER 3x6 2#  Seated ER at 90/90 supported on bolster 3x10    manual therapy techniques: Joint mobilizations and Soft tissue Mobilization were applied to the: L shoulder for 10 minutes, including:  Skilled PROM flexion and ER @20 degrees abduction   Grade II/III a/p inferior GH joint mobs     neuromuscular re-education activities to improve: Kinesthetic and Proprioception for 27 minutes. The following activities were included:  Prone extension 3x10   Prone row 3x10 5#  GTB ER walkout at 90 deg flexion 3x6  GTB IR walkout at 90 deg 3x6  Serratus wall slide 3x10  Wall ball RS at 90 deg FE: up/down, side/side, CW, CCW x10 each  Standing scaption raise from 70->100 degrees (cuing on scap retraction and depression)  Standing scaption raise to 90 1# with contralat iso hold 10/10/10      Not today:  Standing row RTB 3x10  Standing extension RTB 3x10  Side-lying dowel flexion for LT/SA retraining 3x10   Standing dowel landmine press 3x10  GTB rows 3x10  YTB shoulder extension 3x10      therapeutic activities to improve functional performance for 08 minutes, including:  UBE 4' forward, 4' backwards to improve CV endurance and tissue extensibility      Patient Education and Home Exercises       Education provided:   - HEP, POC, answered patient questions      Written Home Exercises Provided: yes. Exercises were reviewed and Ed was able to demonstrate them prior to the end of the session.  Ed demonstrated good   understanding of the education provided. See EMR under Patient Instructions for exercises provided during therapy sessions    Assessment   Ed did well today. Still a little stiff into flexion but is where he needs to be for 8 weeks out. Slowly progressed strength today to noelle. Also worked on reducing shrug sign today with cuing for PT during scaption raises.     Ed Is progressing well towards his goals.   Pt prognosis is Excellent.     Pt will continue to benefit from skilled outpatient physical therapy to address the deficits listed in the problem list box on initial evaluation, provide pt/family education and to maximize pt's level of independence in the home and community environment.     Pt's spiritual, cultural and educational needs considered and pt agreeable to plan of care and goals.     Anticipated barriers to physical therapy: none    GOALS: Short Term Goals:  8 weeks  1.Report decreased shoulder pain < / =  4/10 at worst to increase tolerance for working  2. Increase PROM to WNL.   3. Pt will be able to tolerate MMT testing and measure at no lower that 4-/5 for flexor, abductors, external rotators and internal rotators.   4. Pt to tolerate HEP to improve ROM and independence with ADL's     Long Term Goals: 16  weeks  1.Report decreased shoulder pain  < / =  1 /10 at worst to increase tolerance for working.   2.Increase AROM to WNL.  3.Increase strength to >/= 4/+5 in areas of limitation to increase tolerance for ADL and work activities.  4. Pt goal: able to return to normal exercise routine with no limitations from shoulder.   5. Pt will have improved gcode of CJ (20-40% limited) on FOTO shoulder in order to demonstrate true functional improvement.    Plan     Cont to treat following precautions      Arturo Aleman, PT, DPT

## 2023-07-11 ENCOUNTER — CLINICAL SUPPORT (OUTPATIENT)
Dept: REHABILITATION | Facility: HOSPITAL | Age: 43
End: 2023-07-11
Payer: COMMERCIAL

## 2023-07-11 DIAGNOSIS — G89.29 CHRONIC LEFT SHOULDER PAIN: Primary | ICD-10-CM

## 2023-07-11 DIAGNOSIS — M25.512 CHRONIC LEFT SHOULDER PAIN: Primary | ICD-10-CM

## 2023-07-11 PROCEDURE — 97110 THERAPEUTIC EXERCISES: CPT | Mod: PN

## 2023-07-11 PROCEDURE — 97112 NEUROMUSCULAR REEDUCATION: CPT | Mod: PN

## 2023-07-11 PROCEDURE — 97530 THERAPEUTIC ACTIVITIES: CPT | Mod: PN

## 2023-07-11 NOTE — PROGRESS NOTES
[facility]   Physical Therapy Treatment Note      Name: Ed Osei  Clinic Number: [id number]    Therapy Diagnosis:   Encounter Diagnosis   Name Primary?    Chronic left shoulder pain Yes       Physician: Sincere Beebe III, *    Visit Date: 7/11/2023    Physician Orders: PT Eval and Treat  Medical Diagnosis from Referral: M75.102 (ICD-10-CM) - Non-traumatic rotator cuff tear, left  Evaluation Date: 5/23/2023  Authorization Period Expiration: 6/23/23  Plan of Care Expiration: 10/29/23  Progress Note Due: 6/23/23  Visit # / Visits authorized: 8/ 20  Next MD visit: 6/19/23  FOTO: 1/3     Precautions: Standard   DOS 5/9/23     POSTOPERATIVE PLAN: We will follow the arthroscopic rotator cuff repair guidelines for a small size rotator cuff tear.  We discussed with the patient's family after surgery.  The patient will remain in a sling for 6 weeks.  PT to start at 1-2 weeks.     Cuff specific program:  Pendulum exercises and Codman's exercises in 5-7 days, protecting rotator cuff repair for 1 week by avoiding active motion program until 1 week.     PASSIVE ROM: ER side 30 degrees, Forward Flex 90 degrees, ABD - 60 degrees   Full AAROM/PROM starting at 5-7 days as tolerated      OPERATION:   left  1. Shoulder arthroscopic rotator cuff repair (upper 1/8 subscapularis) (CPT 15177) with CuffMend ()  2. Shoulder arthroscopic biceps tenodesis (CPT 52396)  3. Shoulder arthroscopic subacromial decompression, bursectomy   4. Shoulder arthroscopic extensive debridement (anterior, posterior glenohumeral joint, subacromial space) (CPT 16990)  5. Shoulder arthroscopic labral debridement (CPT 52612)  6. Shoulder arthroscopic lysis of adhesions (CPT 95143)    PTA Visit #: 0/5     Time In: 2:30 pm  Time Out: 3:30 pm  Total Billable Time: 60 minutes    Subjective     Pt reports: been feeling really good no complaints  He was compliant with home exercise program.  Response to previous treatment:  noelle eval well  Functional change: ongoing    Pain: 0/10  Location: left shoulder      Objective      DOS: 5/9/23: 9 weeks 0 days POD as of 7/11/23    Treatment     Shoulder Passive Range of Motion:   Shoulder Right Left*   Flexion   170 160   ER at 0   N/t 70   ER at 90   N/t 75   IR at 90   N/t 60           Ed received the treatments listed below:      therapeutic exercises to develop strength and ROM for 12 minutes including:  Side-lying open books 20xB  SL ER 3x10-15 2#  Supine ER at 90/90 3x10 RTB    Not today  Seated ER at 90/90 supported on bolster 3x10  Supine dowel ER AAROM 3x10 5s holds  Supine dowel flexion AAROM 3x10 5s holds    manual therapy techniques: Joint mobilizations and Soft tissue Mobilization were applied to the: L shoulder for 10 minutes, including:  Skilled PROM flexion and ER @20 degrees abduction   Grade II/III a/p inferior GH joint mobs     neuromuscular re-education activities to improve: Kinesthetic and Proprioception for 30 minutes. The following activities were included:  Scapular clocks YTB 3x6  RTB ER isotonics at 90 deg flexion 3x10  GTB IR isotonics at 90 deg flexion 3x10  Lower trap liftoff at wall 3x10    Not today  Serratus wall slide 3x10  Wall ball RS at 90 deg FE: up/down, side/side, CW, CCW x10 each  Standing scaption raise to 90 1# with contralat iso hold 10/10/10      Not today:  Prone extension 3x10   Prone row 3x10 5#  Standing scaption raise from 70->100 degrees (cuing on scap retraction and depression)  Side-lying dowel flexion for LT/SA retraining 3x10   Standing dowel landmine press 3x10  GTB rows 3x10  YTB shoulder extension 3x10      therapeutic activities to improve functional performance for 08 minutes, including:  UBE 4' forward, 4' backwards to improve CV endurance and tissue extensibility      Patient Education and Home Exercises       Education provided:   - HEP, POC, answered patient questions      Written Home Exercises Provided: yes. Exercises were  reviewed and Ed was able to demonstrate them prior to the end of the session.  Ed demonstrated good  understanding of the education provided. See EMR under Patient Instructions for exercises provided during therapy sessions    Assessment   Ed did well today. Overall moody's good ROM at this stage, min restrictions noted at end ranges as expected. Progressed cuff activation at increased angles of FE during session today with no complaints reported but sig fatigue as expected. Overall doing well at this stage needs to continue to work on normalizing ROM and improve periscap/cuff strengthening.      Ed Is progressing well towards his goals.   Pt prognosis is Excellent.     Pt will continue to benefit from skilled outpatient physical therapy to address the deficits listed in the problem list box on initial evaluation, provide pt/family education and to maximize pt's level of independence in the home and community environment.     Pt's spiritual, cultural and educational needs considered and pt agreeable to plan of care and goals.     Anticipated barriers to physical therapy: none    GOALS: Short Term Goals:  8 weeks  1.Report decreased shoulder pain < / =  4/10 at worst to increase tolerance for working  2. Increase PROM to WNL.   3. Pt will be able to tolerate MMT testing and measure at no lower that 4-/5 for flexor, abductors, external rotators and internal rotators.   4. Pt to tolerate HEP to improve ROM and independence with ADL's     Long Term Goals: 16  weeks  1.Report decreased shoulder pain  < / =  1 /10 at worst to increase tolerance for working.   2.Increase AROM to WNL.  3.Increase strength to >/= 4/+5 in areas of limitation to increase tolerance for ADL and work activities.  4. Pt goal: able to return to normal exercise routine with no limitations from shoulder.   5. Pt will have improved gcode of CJ (20-40% limited) on FOTO shoulder in order to demonstrate true functional improvement.    Plan      Cont to treat following precautions      ANILA BUTLER, PT, DPT, SCS

## 2023-07-18 ENCOUNTER — CLINICAL SUPPORT (OUTPATIENT)
Dept: REHABILITATION | Facility: HOSPITAL | Age: 43
End: 2023-07-18
Payer: COMMERCIAL

## 2023-07-18 DIAGNOSIS — G89.29 CHRONIC LEFT SHOULDER PAIN: Primary | ICD-10-CM

## 2023-07-18 DIAGNOSIS — M25.512 CHRONIC LEFT SHOULDER PAIN: Primary | ICD-10-CM

## 2023-07-18 PROCEDURE — 97110 THERAPEUTIC EXERCISES: CPT | Mod: PN

## 2023-07-18 PROCEDURE — 97112 NEUROMUSCULAR REEDUCATION: CPT | Mod: PN

## 2023-07-18 PROCEDURE — 97140 MANUAL THERAPY 1/> REGIONS: CPT | Mod: PN

## 2023-07-18 NOTE — PROGRESS NOTES
[facility]   Physical Therapy Treatment Note      Name: Ed Osei  Clinic Number: [id number]    Therapy Diagnosis:   Encounter Diagnosis   Name Primary?    Chronic left shoulder pain Yes         Physician: Sincere Beebe III, *    Visit Date: 7/18/2023    Physician Orders: PT Eval and Treat  Medical Diagnosis from Referral: M75.102 (ICD-10-CM) - Non-traumatic rotator cuff tear, left  Evaluation Date: 5/23/2023  Authorization Period Expiration: 6/23/23  Plan of Care Expiration: 10/29/23  Progress Note Due: n/a  Visit # / Visits authorized: 9/ 20  Next MD visit: 6/19/23  FOTO: 1/3     Precautions: Standard   DOS 5/9/23     POSTOPERATIVE PLAN: We will follow the arthroscopic rotator cuff repair guidelines for a small size rotator cuff tear.  We discussed with the patient's family after surgery.  The patient will remain in a sling for 6 weeks.  PT to start at 1-2 weeks.     Cuff specific program:  Pendulum exercises and Codman's exercises in 5-7 days, protecting rotator cuff repair for 1 week by avoiding active motion program until 1 week.     PASSIVE ROM: ER side 30 degrees, Forward Flex 90 degrees, ABD - 60 degrees   Full AAROM/PROM starting at 5-7 days as tolerated      OPERATION:   left  1. Shoulder arthroscopic rotator cuff repair (upper 1/8 subscapularis) (CPT 72303) with CuffMend ()  2. Shoulder arthroscopic biceps tenodesis (CPT 73927)  3. Shoulder arthroscopic subacromial decompression, bursectomy   4. Shoulder arthroscopic extensive debridement (anterior, posterior glenohumeral joint, subacromial space) (CPT 33624)  5. Shoulder arthroscopic labral debridement (CPT 02075)  6. Shoulder arthroscopic lysis of adhesions (CPT 41022)    PTA Visit #: 0/5     Time In: 2:20 pm  Time Out: 3:20 pm  Total Billable Time: 60 minutes    Subjective     Pt reports: been feeling really good no complaints  He was compliant with home exercise program.  Response to previous treatment: noelle  eval well  Functional change: ongoing    Pain: 0/10  Location: left shoulder      Objective      DOS: 5/9/23: 10 weeks 0 days POD as of 7/18/23    Treatment     Shoulder Passive Range of Motion:   Shoulder Right Left*   Flexion   170 160   ER at 0   N/t 70   ER at 90   N/t 75   IR at 90   N/t 60           Ed received the treatments listed below:      therapeutic exercises to develop strength and ROM for 15 minutes including:  Hand heel rocking for shoulder flexion 20x5s holds  Side-lying open books 20xB  SL ER 3x10-15 2#  Seated ER at 90/90 3x10 2#    Not today  Seated ER at 90/90 supported on bolster 3x10  Supine dowel ER AAROM 3x10 5s holds  Supine dowel flexion AAROM 3x10 5s holds    manual therapy techniques: Joint mobilizations and Soft tissue Mobilization were applied to the: L shoulder for 10 minutes, including:  Skilled PROM flexion and ER @20 degrees abduction   Grade II/III a/p inferior GH joint mobs     neuromuscular re-education activities to improve: Kinesthetic and Proprioception for 35 minutes. The following activities were included:  RTB ER isotonics at 90 deg flexion 3x10  GTB IR isotonics at 90 deg flexion 3x10  Lower trap liftoff at wall 3x10  Landmine press 5# - 3x12  Prone extension 3x10   Prone row 3x10 5#  Wall ball RS at 90 deg FE: up/down, side/side, CW, CCW x10 each    Not today  Scapular clocks YTB 3x6  Serratus wall slide 3x10  Standing scaption raise to 90 1# with contralat iso hold 10/10/10      Not today:  Standing scaption raise from 70->100 degrees (cuing on scap retraction and depression)  Side-lying dowel flexion for LT/SA retraining 3x10   Standing dowel landmine press 3x10  GTB rows 3x10  YTB shoulder extension 3x10      therapeutic activities to improve functional performance for 08 minutes, including:  UBE 4' forward, 4' backwards to improve CV endurance and tissue extensibility      Patient Education and Home Exercises       Education provided:   - HEP, POC, answered  patient questions      Written Home Exercises Provided: yes. Exercises were reviewed and Ed was able to demonstrate them prior to the end of the session.  Ed demonstrated good  understanding of the education provided. See EMR under Patient Instructions for exercises provided during therapy sessions    Assessment   Ed did well today. He is still limited with forward elevation actively, but performs that motion with minimal shrug sign at this time. His strength and endurance is slowly progressing, but he still fatigues very fast as expected per post op week 10. Doing well per protocol at this time.       Ed Is progressing well towards his goals.   Pt prognosis is Excellent.     Pt will continue to benefit from skilled outpatient physical therapy to address the deficits listed in the problem list box on initial evaluation, provide pt/family education and to maximize pt's level of independence in the home and community environment.     Pt's spiritual, cultural and educational needs considered and pt agreeable to plan of care and goals.     Anticipated barriers to physical therapy: none    GOALS: Short Term Goals:  8 weeks  1.Report decreased shoulder pain < / =  4/10 at worst to increase tolerance for working  2. Increase PROM to WNL.   3. Pt will be able to tolerate MMT testing and measure at no lower that 4-/5 for flexor, abductors, external rotators and internal rotators.   4. Pt to tolerate HEP to improve ROM and independence with ADL's     Long Term Goals: 16  weeks  1.Report decreased shoulder pain  < / =  1 /10 at worst to increase tolerance for working.   2.Increase AROM to WNL.  3.Increase strength to >/= 4/+5 in areas of limitation to increase tolerance for ADL and work activities.  4. Pt goal: able to return to normal exercise routine with no limitations from shoulder.   5. Pt will have improved gcode of CJ (20-40% limited) on FOTO shoulder in order to demonstrate true functional  improvement.    Plan     Cont to treat following precautions      Arturo Aleman, PT, DPT

## 2023-07-25 ENCOUNTER — CLINICAL SUPPORT (OUTPATIENT)
Dept: REHABILITATION | Facility: HOSPITAL | Age: 43
End: 2023-07-25
Payer: COMMERCIAL

## 2023-07-25 DIAGNOSIS — M25.512 CHRONIC LEFT SHOULDER PAIN: Primary | ICD-10-CM

## 2023-07-25 DIAGNOSIS — G89.29 CHRONIC LEFT SHOULDER PAIN: Primary | ICD-10-CM

## 2023-07-25 PROCEDURE — 97140 MANUAL THERAPY 1/> REGIONS: CPT | Mod: PN

## 2023-07-25 PROCEDURE — 97530 THERAPEUTIC ACTIVITIES: CPT | Mod: PN

## 2023-07-25 PROCEDURE — 97112 NEUROMUSCULAR REEDUCATION: CPT | Mod: PN

## 2023-07-25 PROCEDURE — 97110 THERAPEUTIC EXERCISES: CPT | Mod: PN

## 2023-07-25 NOTE — PROGRESS NOTES
[facility]   Physical Therapy Treatment Note      Name: Ed Osei  Clinic Number: [id number]    Therapy Diagnosis:   Encounter Diagnosis   Name Primary?    Chronic left shoulder pain Yes       Physician: Sincere Beebe III, *    Visit Date: 7/25/2023    Physician Orders: PT Eval and Treat  Medical Diagnosis from Referral: M75.102 (ICD-10-CM) - Non-traumatic rotator cuff tear, left  Evaluation Date: 5/23/2023  Authorization Period Expiration: 6/23/23  Plan of Care Expiration: 10/29/23  Progress Note Due: n/a  Visit # / Visits authorized: 10/ 20  Next MD visit: 6/19/23  FOTO: 3/3 administered 7/25/23: 65/100=35% limited     Precautions: Standard   DOS 5/9/23     POSTOPERATIVE PLAN: We will follow the arthroscopic rotator cuff repair guidelines for a small size rotator cuff tear.  We discussed with the patient's family after surgery.  The patient will remain in a sling for 6 weeks.  PT to start at 1-2 weeks.     Cuff specific program:  Pendulum exercises and Codman's exercises in 5-7 days, protecting rotator cuff repair for 1 week by avoiding active motion program until 1 week.     PASSIVE ROM: ER side 30 degrees, Forward Flex 90 degrees, ABD - 60 degrees   Full AAROM/PROM starting at 5-7 days as tolerated      OPERATION:   left  1. Shoulder arthroscopic rotator cuff repair (upper 1/8 subscapularis) (CPT 97332) with CuffMend ()  2. Shoulder arthroscopic biceps tenodesis (CPT 41154)  3. Shoulder arthroscopic subacromial decompression, bursectomy   4. Shoulder arthroscopic extensive debridement (anterior, posterior glenohumeral joint, subacromial space) (CPT 40970)  5. Shoulder arthroscopic labral debridement (CPT 19280)  6. Shoulder arthroscopic lysis of adhesions (CPT 03232)    PTA Visit #: 0/5     Time In: 2:25 pm  Time Out: 3:24 pm  Total Billable Time: 59 minutes    Subjective     Pt reports: been dealing with a cold that has made me feel not so great but otherwise doing  "good  He was compliant with home exercise program.  Response to previous treatment: noelle mario well  Functional change: improved motion    Pain: 0/10  Location: left shoulder      Objective      DOS: 5/9/23: 11 weeks 0 days POD as of 7/25/23    Treatment     Shoulder Passive Range of Motion:   Shoulder Right Left*   Flexion   170 170   ER at 0   N/t 80   ER at 90   N/t 90   IR at 90   N/t 60     Shoulder Active Range of Motion:   Shoulder Right Left   Flexion   175 175   PATY   T2 C7   FIR   T12 T12         Ed received the treatments listed below:      therapeutic exercises to develop strength and ROM for 10 minutes including:  Quadruped thoracic rotation 25xB  SL ER 3x10-15 2#      Not today  Seated ER at 90/90 3x10 2#  Side-lying open books 20xB  Seated ER at 90/90 supported on bolster 3x10  Supine dowel ER AAROM 3x10 5s holds  Supine dowel flexion AAROM 3x10 5s holds    manual therapy techniques: Joint mobilizations and Soft tissue Mobilization were applied to the: L shoulder for 10 minutes, including:  Skilled PROM in all planes  Grade III/IV a/p inferior GH joint mobs  Manual posterior cuff stretch  Manual lat stretch      neuromuscular re-education activities to improve: Kinesthetic and Proprioception for 31 minutes. The following activities were included:  Serratus hand heel rock with LT liftoff 3x10  Prone T isometric holds 3x5 5"  Prone Y isometric holds 3x5 5"  RTB ER isotonics at 90/90 3x10  GTB IR isotonics at 90/90 3x10  BB landmine press 3x10B  Standing scaption raise to 90 2# with contralat iso hold 10/10/10  Standing ABD raise to 90 2# with contralat iso hold 10/10/10      Not today:  Landmine press 5# - 3x12  Wall ball RS at 90 deg FE: up/down, side/side, CW, CCW x10 each  Prone extension 3x10   Prone row 3x10 5#  Scapular clocks YTB 3x6  Serratus wall slide 3x10  Standing scaption raise from 70->100 degrees (cuing on scap retraction and depression)  GTB rows 3x10  YTB shoulder extension " 3x10      therapeutic activities to improve functional performance for 08 minutes, including:  UBE 4' forward, 4' backwards level 6 to improve CV endurance and tissue extensibility      Patient Education and Home Exercises       Education provided:   - HEP, POC, answered patient questions      Written Home Exercises Provided: yes. Exercises were reviewed and Ed was able to demonstrate them prior to the end of the session.  Ed demonstrated good  understanding of the education provided. See EMR under Patient Instructions for exercises provided during therapy sessions    Assessment   Ed completed session as noted above. AROM assessment demo'd nearly symmetrical ROM upon arrival with tightness reported at end range flexion and functional ER/IR. Noted posterior cuff tightness that response well to manual posterior cuff stretching. Progressed periscapular activation as well as cuff isotonics to 90/90 position with no pain reported but significant fatigue reported. Overall doing well at this stage plan to continue to address end range ROM restrictions as well as work through functional strengthening progressions.       Ed Is progressing well towards his goals.   Pt prognosis is Excellent.     Pt will continue to benefit from skilled outpatient physical therapy to address the deficits listed in the problem list box on initial evaluation, provide pt/family education and to maximize pt's level of independence in the home and community environment.     Pt's spiritual, cultural and educational needs considered and pt agreeable to plan of care and goals.     Anticipated barriers to physical therapy: none    GOALS: Short Term Goals:  8 weeks  1.Report decreased shoulder pain < / =  4/10 at worst to increase tolerance for working MET  2. Increase PROM to WNL.  MET  3. Pt will be able to tolerate MMT testing and measure at no lower that 4-/5 for flexor, abductors, external rotators and internal rotators. MET   4. Pt  to tolerate HEP to improve ROM and independence with ADL's MET     Long Term Goals: 16  weeks  1.Report decreased shoulder pain  < / =  1 /10 at worst to increase tolerance for working.   2.Increase AROM to WNL.  3.Increase strength to >/= 4/+5 in areas of limitation to increase tolerance for ADL and work activities.  4. Pt goal: able to return to normal exercise routine with no limitations from shoulder.   5. Pt will have improved gcode of CJ (20-40% limited) on FOTO shoulder in order to demonstrate true functional improvement.    Plan     Normalize end range ROM and progress functional strengthening    ANILA BUTLER, PT, DPT, SCS

## 2023-07-31 ENCOUNTER — OFFICE VISIT (OUTPATIENT)
Dept: SPORTS MEDICINE | Facility: CLINIC | Age: 43
End: 2023-07-31
Payer: COMMERCIAL

## 2023-07-31 VITALS
DIASTOLIC BLOOD PRESSURE: 76 MMHG | HEIGHT: 66 IN | WEIGHT: 157.88 LBS | HEART RATE: 63 BPM | BODY MASS INDEX: 25.37 KG/M2 | SYSTOLIC BLOOD PRESSURE: 116 MMHG

## 2023-07-31 DIAGNOSIS — Z98.890 S/P SHOULDER SURGERY: ICD-10-CM

## 2023-07-31 DIAGNOSIS — M25.512 CHRONIC LEFT SHOULDER PAIN: Primary | ICD-10-CM

## 2023-07-31 DIAGNOSIS — G89.29 CHRONIC LEFT SHOULDER PAIN: Primary | ICD-10-CM

## 2023-07-31 PROCEDURE — 3078F PR MOST RECENT DIASTOLIC BLOOD PRESSURE < 80 MM HG: ICD-10-PCS | Mod: CPTII,S$GLB,, | Performed by: PHYSICIAN ASSISTANT

## 2023-07-31 PROCEDURE — 99213 PR OFFICE/OUTPT VISIT, EST, LEVL III, 20-29 MIN: ICD-10-PCS | Mod: S$GLB,,, | Performed by: PHYSICIAN ASSISTANT

## 2023-07-31 PROCEDURE — 3008F BODY MASS INDEX DOCD: CPT | Mod: CPTII,S$GLB,, | Performed by: PHYSICIAN ASSISTANT

## 2023-07-31 PROCEDURE — 3044F PR MOST RECENT HEMOGLOBIN A1C LEVEL <7.0%: ICD-10-PCS | Mod: CPTII,S$GLB,, | Performed by: PHYSICIAN ASSISTANT

## 2023-07-31 PROCEDURE — 3074F SYST BP LT 130 MM HG: CPT | Mod: CPTII,S$GLB,, | Performed by: PHYSICIAN ASSISTANT

## 2023-07-31 PROCEDURE — 3074F PR MOST RECENT SYSTOLIC BLOOD PRESSURE < 130 MM HG: ICD-10-PCS | Mod: CPTII,S$GLB,, | Performed by: PHYSICIAN ASSISTANT

## 2023-07-31 PROCEDURE — 99213 OFFICE O/P EST LOW 20 MIN: CPT | Mod: S$GLB,,, | Performed by: PHYSICIAN ASSISTANT

## 2023-07-31 PROCEDURE — 1160F PR REVIEW ALL MEDS BY PRESCRIBER/CLIN PHARMACIST DOCUMENTED: ICD-10-PCS | Mod: CPTII,S$GLB,, | Performed by: PHYSICIAN ASSISTANT

## 2023-07-31 PROCEDURE — 1160F RVW MEDS BY RX/DR IN RCRD: CPT | Mod: CPTII,S$GLB,, | Performed by: PHYSICIAN ASSISTANT

## 2023-07-31 PROCEDURE — 3044F HG A1C LEVEL LT 7.0%: CPT | Mod: CPTII,S$GLB,, | Performed by: PHYSICIAN ASSISTANT

## 2023-07-31 PROCEDURE — 3008F PR BODY MASS INDEX (BMI) DOCUMENTED: ICD-10-PCS | Mod: CPTII,S$GLB,, | Performed by: PHYSICIAN ASSISTANT

## 2023-07-31 PROCEDURE — 1159F PR MEDICATION LIST DOCUMENTED IN MEDICAL RECORD: ICD-10-PCS | Mod: CPTII,S$GLB,, | Performed by: PHYSICIAN ASSISTANT

## 2023-07-31 PROCEDURE — 99999 PR PBB SHADOW E&M-EST. PATIENT-LVL III: ICD-10-PCS | Mod: PBBFAC,,, | Performed by: PHYSICIAN ASSISTANT

## 2023-07-31 PROCEDURE — 1159F MED LIST DOCD IN RCRD: CPT | Mod: CPTII,S$GLB,, | Performed by: PHYSICIAN ASSISTANT

## 2023-07-31 PROCEDURE — 99999 PR PBB SHADOW E&M-EST. PATIENT-LVL III: CPT | Mod: PBBFAC,,, | Performed by: PHYSICIAN ASSISTANT

## 2023-07-31 PROCEDURE — 3078F DIAST BP <80 MM HG: CPT | Mod: CPTII,S$GLB,, | Performed by: PHYSICIAN ASSISTANT

## 2023-08-01 ENCOUNTER — CLINICAL SUPPORT (OUTPATIENT)
Dept: REHABILITATION | Facility: HOSPITAL | Age: 43
End: 2023-08-01
Payer: COMMERCIAL

## 2023-08-01 DIAGNOSIS — M25.512 CHRONIC LEFT SHOULDER PAIN: Primary | ICD-10-CM

## 2023-08-01 DIAGNOSIS — G89.29 CHRONIC LEFT SHOULDER PAIN: Primary | ICD-10-CM

## 2023-08-01 PROCEDURE — 97110 THERAPEUTIC EXERCISES: CPT | Mod: PN

## 2023-08-01 PROCEDURE — 97112 NEUROMUSCULAR REEDUCATION: CPT | Mod: PN

## 2023-08-01 PROCEDURE — 97530 THERAPEUTIC ACTIVITIES: CPT | Mod: PN

## 2023-08-01 PROCEDURE — 97140 MANUAL THERAPY 1/> REGIONS: CPT | Mod: PN

## 2023-08-01 NOTE — PROGRESS NOTES
[facility]   Physical Therapy Treatment Note      Name: Ed Osei  Clinic Number: [id number]    Therapy Diagnosis:   Encounter Diagnosis   Name Primary?    Chronic left shoulder pain Yes       Physician: Sincere Beebe III, *    Visit Date: 8/1/2023    Physician Orders: PT Eval and Treat  Medical Diagnosis from Referral: M75.102 (ICD-10-CM) - Non-traumatic rotator cuff tear, left  Evaluation Date: 5/23/2023  Authorization Period Expiration: 6/23/23  Plan of Care Expiration: 10/29/23  Progress Note Due: n/a  Visit # / Visits authorized: 11/ 20  Next MD visit: 6/19/23  FOTO: 3/3 administered 7/25/23: 65/100=35% limited     Precautions: Standard   DOS 5/9/23     POSTOPERATIVE PLAN: We will follow the arthroscopic rotator cuff repair guidelines for a small size rotator cuff tear.  We discussed with the patient's family after surgery.  The patient will remain in a sling for 6 weeks.  PT to start at 1-2 weeks.     Cuff specific program:  Pendulum exercises and Codman's exercises in 5-7 days, protecting rotator cuff repair for 1 week by avoiding active motion program until 1 week.     PASSIVE ROM: ER side 30 degrees, Forward Flex 90 degrees, ABD - 60 degrees   Full AAROM/PROM starting at 5-7 days as tolerated      OPERATION:   left  1. Shoulder arthroscopic rotator cuff repair (upper 1/8 subscapularis) (CPT 30673) with CuffMend ()  2. Shoulder arthroscopic biceps tenodesis (CPT 09830)  3. Shoulder arthroscopic subacromial decompression, bursectomy   4. Shoulder arthroscopic extensive debridement (anterior, posterior glenohumeral joint, subacromial space) (CPT 70380)  5. Shoulder arthroscopic labral debridement (CPT 25507)  6. Shoulder arthroscopic lysis of adhesions (CPT 01677)    PTA Visit #: 0/5     Time In: 2:15 pm  Time Out: 3:15 pm  Total Billable Time: 60 minutes    Subjective     Pt reports: had f/u with Derek yesterday and he said everything looks good continue PT  He was  compliant with home exercise program.  Response to previous treatment: noelle mario well  Functional change: improved motion    Pain: 0/10  Location: left shoulder      Objective      DOS: 5/9/23: 12 weeks 0 days POD as of 7/31/23    Treatment     Shoulder Passive Range of Motion:   Shoulder Right Left*   Flexion   170 170   ER at 0   N/t 80   ER at 90   N/t 90   IR at 90   N/t 60     Shoulder Active Range of Motion:   Shoulder Right Left   Flexion   175 175   PATY   T2 C7   FIR   T12 T12         Ed received the treatments listed below:      therapeutic exercises to develop strength and ROM for 10 minutes including:  Quadruped thoracic rotation 25xB  SL ER 3x10-15 2#      Not today  Seated ER at 90/90 3x10 2#  Side-lying open books 20xB  Seated ER at 90/90 supported on bolster 3x10  Supine dowel ER AAROM 3x10 5s holds  Supine dowel flexion AAROM 3x10 5s holds    manual therapy techniques: Joint mobilizations and Soft tissue Mobilization were applied to the: L shoulder for 10 minutes, including:  Skilled PROM in all planes  Grade III/IV a/p inferior GH joint mobs  Manual posterior cuff stretch  Manual lat stretch      neuromuscular re-education activities to improve: Kinesthetic and Proprioception for 32 minutes. The following activities were included:  Serratus hand heel rock with LT liftoff 3x10  Prone T/Y on SB 3x8 each  RTB ER isotonics at 90/90 3x10  GTB IR isotonics at 90/90 3x10  Pushup + at mat 4x8  BB landmine press 3x10B  Standing scaption raise to 90 2# with contralat iso hold 10/10/10  Standing ABD raise to 90 2# with contralat iso hold 10/10/10      Not today:  Wall ball RS at 90 deg FE: up/down, side/side, CW, CCW x10 each  Scapular clocks YTB 3x6  Serratus wall slide 3x10  Standing scaption raise from 70->100 degrees (cuing on scap retraction and depression)    therapeutic activities to improve functional performance for 08 minutes, including:  UBE 4' forward, 4' backwards level 6 to improve CV  endurance and tissue extensibility      Patient Education and Home Exercises       Education provided:   - HEP, POC, answered patient questions      Written Home Exercises Provided: yes. Exercises were reviewed and Ed was able to demonstrate them prior to the end of the session.  Ed demonstrated good  understanding of the education provided. See EMR under Patient Instructions for exercises provided during therapy sessions    Assessment   Ed completed session as noted above. AROM assessment demo'd nearly symmetrical ROM upon arrival with tightness reported at end range flexion and functional ER/IR. Noted posterior cuff tightness that response well to manual posterior cuff stretching. Continued with periscapular activation as well as cuff isotonics to 90/90 position with no pain reported but significant fatigue reported. Able to initiate pushup progression during session today with no complaints of pain. Overall doing well at this stage plan to continue to address end range ROM restrictions as well as work through functional strengthening progressions.       Ed Is progressing well towards his goals.   Pt prognosis is Excellent.     Pt will continue to benefit from skilled outpatient physical therapy to address the deficits listed in the problem list box on initial evaluation, provide pt/family education and to maximize pt's level of independence in the home and community environment.     Pt's spiritual, cultural and educational needs considered and pt agreeable to plan of care and goals.     Anticipated barriers to physical therapy: none    GOALS: Short Term Goals:  8 weeks  1.Report decreased shoulder pain < / =  4/10 at worst to increase tolerance for working MET  2. Increase PROM to WNL.  MET  3. Pt will be able to tolerate MMT testing and measure at no lower that 4-/5 for flexor, abductors, external rotators and internal rotators. MET   4. Pt to tolerate HEP to improve ROM and independence with  ADL's MET     Long Term Goals: 16  weeks  1.Report decreased shoulder pain  < / =  1 /10 at worst to increase tolerance for working.   2.Increase AROM to WNL.  3.Increase strength to >/= 4/+5 in areas of limitation to increase tolerance for ADL and work activities.  4. Pt goal: able to return to normal exercise routine with no limitations from shoulder.   5. Pt will have improved gcode of CJ (20-40% limited) on FOTO shoulder in order to demonstrate true functional improvement.    Plan     Normalize end range ROM and progress functional strengthening    ANILA BUTLER, PT, DPT, SCS

## 2023-08-08 ENCOUNTER — CLINICAL SUPPORT (OUTPATIENT)
Dept: REHABILITATION | Facility: HOSPITAL | Age: 43
End: 2023-08-08
Payer: COMMERCIAL

## 2023-08-08 DIAGNOSIS — M25.512 CHRONIC LEFT SHOULDER PAIN: Primary | ICD-10-CM

## 2023-08-08 DIAGNOSIS — G89.29 CHRONIC LEFT SHOULDER PAIN: Primary | ICD-10-CM

## 2023-08-08 PROCEDURE — 97110 THERAPEUTIC EXERCISES: CPT | Mod: PN

## 2023-08-08 PROCEDURE — 97530 THERAPEUTIC ACTIVITIES: CPT | Mod: PN

## 2023-08-08 PROCEDURE — 97112 NEUROMUSCULAR REEDUCATION: CPT | Mod: PN

## 2023-08-08 PROCEDURE — 97140 MANUAL THERAPY 1/> REGIONS: CPT | Mod: PN

## 2023-08-08 NOTE — PROGRESS NOTES
[facility]   Physical Therapy Treatment Note      Name: Ed Osei  Clinic Number: [id number]    Therapy Diagnosis:   Encounter Diagnosis   Name Primary?    Chronic left shoulder pain Yes         Physician: Sincere Beebe III, *    Visit Date: 8/8/2023    Physician Orders: PT Eval and Treat  Medical Diagnosis from Referral: M75.102 (ICD-10-CM) - Non-traumatic rotator cuff tear, left  Evaluation Date: 5/23/2023  Authorization Period Expiration: 6/23/23  Plan of Care Expiration: 10/29/23  Progress Note Due: n/a  Visit # / Visits authorized: 12/ 20  Next MD visit: 6/19/23  FOTO: 3/3 administered 7/25/23: 65/100=35% limited     Precautions: Standard   DOS 5/9/23     POSTOPERATIVE PLAN: We will follow the arthroscopic rotator cuff repair guidelines for a small size rotator cuff tear.  We discussed with the patient's family after surgery.  The patient will remain in a sling for 6 weeks.  PT to start at 1-2 weeks.     Cuff specific program:  Pendulum exercises and Codman's exercises in 5-7 days, protecting rotator cuff repair for 1 week by avoiding active motion program until 1 week.     PASSIVE ROM: ER side 30 degrees, Forward Flex 90 degrees, ABD - 60 degrees   Full AAROM/PROM starting at 5-7 days as tolerated      OPERATION:   left  1. Shoulder arthroscopic rotator cuff repair (upper 1/8 subscapularis) (CPT 90003) with CuffMend ()  2. Shoulder arthroscopic biceps tenodesis (CPT 06768)  3. Shoulder arthroscopic subacromial decompression, bursectomy   4. Shoulder arthroscopic extensive debridement (anterior, posterior glenohumeral joint, subacromial space) (CPT 98431)  5. Shoulder arthroscopic labral debridement (CPT 75333)  6. Shoulder arthroscopic lysis of adhesions (CPT 82030)    PTA Visit #: 0/5     Time In: 2:30  pm  Time Out: 3:30 pm  Total Billable Time: 60 minutes    Subjective     Pt reports: was a little sore after last visit but it felt like a good sore  He was  compliant with home exercise program.  Response to previous treatment: noelle mario well  Functional change: improved motion    Pain: 0/10  Location: left shoulder      Objective      DOS: 5/9/23: 13 weeks 0 days POD as of 8/7/23    Treatment     Shoulder Passive Range of Motion:   Shoulder Right Left*   Flexion   170 170   ER at 0   N/t 80   ER at 90   N/t 90   IR at 90   N/t 60     Shoulder Active Range of Motion:   Shoulder Right Left   Flexion   175 175   PATY   T2 C7   FIR   T12 T12         Ed received the treatments listed below:      therapeutic exercises to develop strength and ROM for 10 minutes including:  Quadruped thoracic rotation 25xB  SL ER 3x10-15 2#      Not today  Seated ER at 90/90 3x10 2#  Side-lying open books 20xB  Seated ER at 90/90 supported on bolster 3x10  Supine dowel ER AAROM 3x10 5s holds  Supine dowel flexion AAROM 3x10 5s holds    manual therapy techniques: Joint mobilizations and Soft tissue Mobilization were applied to the: L shoulder for 10 minutes, including:  Skilled PROM in all planes  Grade III/IV a/p inferior GH joint mobs  Manual posterior cuff stretch  Manual lat stretch      neuromuscular re-education activities to improve: Kinesthetic and Proprioception for 32 minutes. The following activities were included:  Serratus hand heel rock with LT liftoff 3x10  TRX Y/T/W 3x10B  RTB ER isotonics at 90/90 3x10  GTB IR isotonics at 90/90 3x10  Incline pushup at weight bench 4x8  Quadruped shoulder taps 3x10B  10# KB OH carry with march x 4 laps    BB landmine press 3x10B  Standing scaption raise to 90 2# with contralat iso hold 10/10/10  Standing ABD raise to 90 2# with contralat iso hold 10/10/10      Not today:  Wall ball RS at 90 deg FE: up/down, side/side, CW, CCW x10 each  Scapular clocks YTB 3x6  Serratus wall slide 3x10  Standing scaption raise from 70->100 degrees (cuing on scap retraction and depression)    therapeutic activities to improve functional performance for 08  minutes, including:  UBE 4' forward, 4' backwards level 6 to improve CV endurance and tissue extensibility      Patient Education and Home Exercises       Education provided:   - HEP, POC, answered patient questions      Written Home Exercises Provided: yes. Exercises were reviewed and Ed was able to demonstrate them prior to the end of the session.  Ed demonstrated good  understanding of the education provided. See EMR under Patient Instructions for exercises provided during therapy sessions    Assessment   Ed completed session as noted above. AROM assessment demo'd nearly symmetrical ROM upon arrival with tightness reported at end range flexion and functional ER/IR. Noted posterior cuff tightness that response well to manual posterior cuff stretching. Continued with periscapular activation as well as cuff isotonics to 90/90 position with no pain reported but significant fatigue reported. Able to initiate pushup progression during session today with no complaints of pain. Overall doing well at this stage plan to continue to address end range ROM restrictions as well as work through functional strengthening progressions.       Ed Is progressing well towards his goals.   Pt prognosis is Excellent.     Pt will continue to benefit from skilled outpatient physical therapy to address the deficits listed in the problem list box on initial evaluation, provide pt/family education and to maximize pt's level of independence in the home and community environment.     Pt's spiritual, cultural and educational needs considered and pt agreeable to plan of care and goals.     Anticipated barriers to physical therapy: none    GOALS: Short Term Goals:  8 weeks  1.Report decreased shoulder pain < / =  4/10 at worst to increase tolerance for working MET  2. Increase PROM to WNL.  MET  3. Pt will be able to tolerate MMT testing and measure at no lower that 4-/5 for flexor, abductors, external rotators and internal  rotators. MET   4. Pt to tolerate HEP to improve ROM and independence with ADL's MET     Long Term Goals: 16  weeks  1.Report decreased shoulder pain  < / =  1 /10 at worst to increase tolerance for working.   2.Increase AROM to WNL.  3.Increase strength to >/= 4/+5 in areas of limitation to increase tolerance for ADL and work activities.  4. Pt goal: able to return to normal exercise routine with no limitations from shoulder.   5. Pt will have improved gcode of CJ (20-40% limited) on FOTO shoulder in order to demonstrate true functional improvement.    Plan     Normalize end range ROM and progress functional strengthening    ANILA BUTLER, PT, DPT, SCS

## 2023-08-14 NOTE — PROGRESS NOTES
Chief Complaint: Left shoulder pain    HISTORY OF PRESENT ILLNESS:   Pt is here today for post-operative followup of shoulder arthroscopy.  he is doing well.  We have reviewed patient's findings and discussed plan of care and future treatment options.      No pain. Working with Puma in PT and doing well with that  Shoulder has been feeling really good.   No other issues reported.     He is back at work with no issues.     Pain at a 0/10.     SANE: 80    DATE OF PROCEDURE: 05/09/2023     SURGEON: Tori Davis M.D.  OPERATION:   left  1. Shoulder arthroscopic rotator cuff repair (upper 1/8 subscapularis) (CPT 33330) with CuffMend ()  2. Shoulder arthroscopic biceps tenodesis (CPT 44844)  3. Shoulder arthroscopic subacromial decompression, bursectomy   4. Shoulder arthroscopic extensive debridement (anterior, posterior glenohumeral joint, subacromial space) (CPT 10801)  5. Shoulder arthroscopic labral debridement (CPT 97410)  6. Shoulder arthroscopic lysis of adhesions (CPT 26130)     Review of Systems   Constitution: Negative. Negative for chills, fever and night sweats.   HENT: Negative for congestion and headaches.    Eyes: Negative for blurred vision, left vision loss and right vision loss.   Cardiovascular: Negative for chest pain and syncope.   Respiratory: Negative for cough and shortness of breath.    Endocrine: Negative for polydipsia, polyphagia and polyuria.   Hematologic/Lymphatic: Negative for bleeding problem. Does not bruise/bleed easily.   Skin: Negative for dry skin, itching and rash.   Musculoskeletal: Negative for falls and muscle weakness.   Gastrointestinal: Negative for abdominal pain and bowel incontinence.   Genitourinary: Negative for bladder incontinence and nocturia.   Neurological: Negative for disturbances in coordination, loss of balance and seizures.   Psychiatric/Behavioral: Negative for depression. The patient does not have insomnia.    Allergic/Immunologic: Negative for hives and  persistent infections.                                                                                 PHYSICAL EXAMINATION:     Incision sites healed well  No evidence of any erythema, infection or induration  elbow Range of motion full   Minimal effusion  2+ Radial pulses  No swelling  ROM: FF-170  ER-55  IR- T12    Scaption Strength 5/5 at 0 and 30  ER 5/5    No TTP  Negative altman                                                                               ASSESSMENT:                                                                                                                                               1. Status post above, doing well.                                                                                                                               PLAN:                                                                                                                                                     1. PT to continue  2. Emphasized scapular function.  3. I have discussed return to activity in detail.  4. Patient will see us back in 12 weeks.                                    5. Discussed case with PT.    All questions were answered, surgical technique was reviewed and interpreted, and patient should contact us with any questions or concerns in the interim.

## 2023-08-17 ENCOUNTER — CLINICAL SUPPORT (OUTPATIENT)
Dept: REHABILITATION | Facility: HOSPITAL | Age: 43
End: 2023-08-17
Payer: COMMERCIAL

## 2023-08-17 DIAGNOSIS — M25.512 CHRONIC LEFT SHOULDER PAIN: Primary | ICD-10-CM

## 2023-08-17 DIAGNOSIS — G89.29 CHRONIC LEFT SHOULDER PAIN: Primary | ICD-10-CM

## 2023-08-17 PROCEDURE — 97110 THERAPEUTIC EXERCISES: CPT | Mod: PN

## 2023-08-17 PROCEDURE — 97112 NEUROMUSCULAR REEDUCATION: CPT | Mod: PN

## 2023-08-17 PROCEDURE — 97140 MANUAL THERAPY 1/> REGIONS: CPT | Mod: PN

## 2023-08-17 PROCEDURE — 97530 THERAPEUTIC ACTIVITIES: CPT | Mod: PN

## 2023-08-17 NOTE — PROGRESS NOTES
[facility]   Physical Therapy Treatment Note      Name: Ed Osei  Clinic Number: [id number]    Therapy Diagnosis:   Encounter Diagnosis   Name Primary?    Chronic left shoulder pain Yes         Physician: Sincere Beebe III, *    Visit Date: 8/17/2023    Physician Orders: PT Eval and Treat  Medical Diagnosis from Referral: M75.102 (ICD-10-CM) - Non-traumatic rotator cuff tear, left  Evaluation Date: 5/23/2023  Authorization Period Expiration: 6/23/23  Plan of Care Expiration: 10/29/23  Progress Note Due: n/a  Visit # / Visits authorized: 13/ 20  Next MD visit: 6/19/23  FOTO: 3/3 administered 7/25/23: 65/100=35% limited     Precautions: Standard   DOS 5/9/23     POSTOPERATIVE PLAN: We will follow the arthroscopic rotator cuff repair guidelines for a small size rotator cuff tear.  We discussed with the patient's family after surgery.  The patient will remain in a sling for 6 weeks.  PT to start at 1-2 weeks.     Cuff specific program:  Pendulum exercises and Codman's exercises in 5-7 days, protecting rotator cuff repair for 1 week by avoiding active motion program until 1 week.     PASSIVE ROM: ER side 30 degrees, Forward Flex 90 degrees, ABD - 60 degrees   Full AAROM/PROM starting at 5-7 days as tolerated      OPERATION:   left  1. Shoulder arthroscopic rotator cuff repair (upper 1/8 subscapularis) (CPT 82152) with CuffMend ()  2. Shoulder arthroscopic biceps tenodesis (CPT 06952)  3. Shoulder arthroscopic subacromial decompression, bursectomy   4. Shoulder arthroscopic extensive debridement (anterior, posterior glenohumeral joint, subacromial space) (CPT 20490)  5. Shoulder arthroscopic labral debridement (CPT 13384)  6. Shoulder arthroscopic lysis of adhesions (CPT 47405)    PTA Visit #: 0/5     Time In: 2:30  pm  Time Out: 3:30 pm  Total Billable Time: 60 minutes    Subjective     Pt reports: he's been fighting through tennis elbow over the last week and he occasionally  feel that the pain radiates into his wrist. Shoulder is doing great, however  He was compliant with home exercise program.  Response to previous treatment: noelle mario well  Functional change: improved motion    Pain: 0/10  Location: left shoulder      Objective      DOS: 5/9/23: 14 weeks 2 days POD as of 8/17/23    Treatment     Shoulder Passive Range of Motion:   Shoulder Right Left*   Flexion   170 170   ER at 0   N/t 80   ER at 90   N/t 90   IR at 90   N/t 60     Shoulder Active Range of Motion:   Shoulder Right Left   Flexion   175 175   PATY   T2 C7   FIR   T12 T12     L elbow:   Palpation: TTP to L common flexor tendon   Special test: + mills, - Abilio , - Jami's , - spurlings   ULTT: + radial nerve       Ed received the treatments listed below:      therapeutic exercises to develop strength and ROM for 10 minutes including:  Quadruped thoracic rotation 20xB  SL ER 3x10-15 2#      Not today  Seated ER at 90/90 3x10 2#  Side-lying open books 20xB  Seated ER at 90/90 supported on bolster 3x10  Supine dowel ER AAROM 3x10 5s holds  Supine dowel flexion AAROM 3x10 5s holds    manual therapy techniques: Joint mobilizations and Soft tissue Mobilization were applied to the: L shoulder for 10 minutes, including:  Skilled PROM in all planes  Grade III/IV a/p inferior GH joint mobs  Manual posterior cuff stretch  Manual lat stretch      neuromuscular re-education activities to improve: Kinesthetic and Proprioception for 32 minutes. The following activities were included:  Serratus hand heel rock with LT liftoff 3x10  TRX Y/T/W 3x10B  RTB ER isotonics at 90/90 3x10  GTB IR isotonics at 90/90 3x10  Incline pushup at weight bench 4x10  Quadruped KB pull through 3x10B  10# KB OH carry with lunge x 4 laps  Sled push/pull 3 laps + 25#     BB landmine press 3x10B  Standing scaption raise to 90 2# with contralat iso hold 10/10/10  Standing ABD raise to 90 2# with contralat iso hold 10/10/10      Not today:  Wall ball RS at  90 deg FE: up/down, side/side, CW, CCW x10 each  Scapular clocks YTB 3x6  Serratus wall slide 3x10  Standing scaption raise from 70->100 degrees (cuing on scap retraction and depression)    therapeutic activities to improve functional performance for 08 minutes, including:  UBE 4' forward, 4' backwards level 6 to improve CV endurance and tissue extensibility      Patient Education and Home Exercises       Education provided:   - HEP, POC, answered patient questions      Written Home Exercises Provided: yes. Exercises were reviewed and Ed was able to demonstrate them prior to the end of the session.  Ed demonstrated good  understanding of the education provided. See EMR under Patient Instructions for exercises provided during therapy sessions    Assessment   Ed completed session as noted above. Continues to demonstrate minimal end range stiffness with active and passive ROM, but overall he is doing well. Assessed his complaints of lateral elbow pain today. He did present with a positive Prakash and TTP directly over the extensor mass tendon, but did have a positive radial nerve ULTT. Too soon to treat for possible lateral epicondylagia or neural tension, but we will monitor symptoms next week.       Ed Is progressing well towards his goals.   Pt prognosis is Excellent.     Pt will continue to benefit from skilled outpatient physical therapy to address the deficits listed in the problem list box on initial evaluation, provide pt/family education and to maximize pt's level of independence in the home and community environment.     Pt's spiritual, cultural and educational needs considered and pt agreeable to plan of care and goals.     Anticipated barriers to physical therapy: none    GOALS: Short Term Goals:  8 weeks  1.Report decreased shoulder pain < / =  4/10 at worst to increase tolerance for working MET  2. Increase PROM to WNL.  MET  3. Pt will be able to tolerate MMT testing and measure at no lower  that 4-/5 for flexor, abductors, external rotators and internal rotators. MET   4. Pt to tolerate HEP to improve ROM and independence with ADL's MET     Long Term Goals: 16  weeks  1.Report decreased shoulder pain  < / =  1 /10 at worst to increase tolerance for working.   2.Increase AROM to WNL.  3.Increase strength to >/= 4/+5 in areas of limitation to increase tolerance for ADL and work activities.  4. Pt goal: able to return to normal exercise routine with no limitations from shoulder.   5. Pt will have improved gcode of CJ (20-40% limited) on FOTO shoulder in order to demonstrate true functional improvement.    Plan     Normalize end range ROM and progress functional strengthening    Arturo Ash, PT, DPT

## 2023-08-22 ENCOUNTER — CLINICAL SUPPORT (OUTPATIENT)
Dept: REHABILITATION | Facility: HOSPITAL | Age: 43
End: 2023-08-22
Payer: COMMERCIAL

## 2023-08-22 DIAGNOSIS — M25.512 CHRONIC LEFT SHOULDER PAIN: Primary | ICD-10-CM

## 2023-08-22 DIAGNOSIS — G89.29 CHRONIC LEFT SHOULDER PAIN: Primary | ICD-10-CM

## 2023-08-22 PROCEDURE — 97530 THERAPEUTIC ACTIVITIES: CPT | Mod: PN

## 2023-08-22 PROCEDURE — 97140 MANUAL THERAPY 1/> REGIONS: CPT | Mod: PN

## 2023-08-22 PROCEDURE — 97112 NEUROMUSCULAR REEDUCATION: CPT | Mod: PN

## 2023-08-22 NOTE — PROGRESS NOTES
[facility]   Physical Therapy Treatment Note      Name: Ed Osei  Clinic Number: [id number]    Therapy Diagnosis:   Encounter Diagnosis   Name Primary?    Chronic left shoulder pain Yes     Physician: Sincere Beebe III, *    Visit Date: 8/22/2023    Physician Orders: PT Eval and Treat  Medical Diagnosis from Referral: M75.102 (ICD-10-CM) - Non-traumatic rotator cuff tear, left  Evaluation Date: 5/23/2023  Authorization Period Expiration: 6/23/23  Plan of Care Expiration: 10/29/23  Progress Note Due: n/a  Visit # / Visits authorized: 14/ 20  FOTO: 4/4 administered 8/22/23: 75/100=25% limited     Precautions: Standard   DOS 5/9/23     POSTOPERATIVE PLAN: We will follow the arthroscopic rotator cuff repair guidelines for a small size rotator cuff tear.  We discussed with the patient's family after surgery.  The patient will remain in a sling for 6 weeks.  PT to start at 1-2 weeks.     Cuff specific program:  Pendulum exercises and Codman's exercises in 5-7 days, protecting rotator cuff repair for 1 week by avoiding active motion program until 1 week.     PASSIVE ROM: ER side 30 degrees, Forward Flex 90 degrees, ABD - 60 degrees   Full AAROM/PROM starting at 5-7 days as tolerated      OPERATION:   left  1. Shoulder arthroscopic rotator cuff repair (upper 1/8 subscapularis) (CPT 64499) with CuffMend ()  2. Shoulder arthroscopic biceps tenodesis (CPT 96599)  3. Shoulder arthroscopic subacromial decompression, bursectomy   4. Shoulder arthroscopic extensive debridement (anterior, posterior glenohumeral joint, subacromial space) (CPT 10227)  5. Shoulder arthroscopic labral debridement (CPT 06695)  6. Shoulder arthroscopic lysis of adhesions (CPT 78282)    PTA Visit #: 0/5     Time In: 2:25  pm  Time Out: 3:31 pm  Total Billable Time: 66 minutes    Subjective     Pt reports: no pain or soreness following last session.   He was compliant with home exercise program.  Response to  previous treatment: muscle soreness  Functional change: improved motion    Pain: 0/10  Location: left shoulder      Objective      DOS: 5/9/23: 15 weeks 0 days POD as of 8/22/23    Treatment     Shoulder Passive Range of Motion:   Shoulder Right Left*   Flexion   170 170   ER at 0   N/t 80   ER at 90   N/t 90   IR at 90   N/t 60     Shoulder Active Range of Motion:   Shoulder Right Left*   Flexion   175 175   PATY   T2 C7   FIR   T12 T12     L elbow:   Palpation: TTP to L common flexor tendon   Special test: + mills, - Abilio , - Jami's , - spurlings   ULTT: + radial nerve       Ed received the treatments listed below:      therapeutic exercises to develop strength and ROM for 3 minutes including:  Quadruped thoracic rotation 20xB        Not today  SL ER 3x10-15 2#  Seated ER at 90/90 3x10 2#  Side-lying open books 20xB  Seated ER at 90/90 supported on bolster 3x10  Supine dowel ER AAROM 3x10 5s holds  Supine dowel flexion AAROM 3x10 5s holds    manual therapy techniques: Joint mobilizations and Soft tissue Mobilization were applied to the: L shoulder for 10 minutes, including:  Skilled PROM in all planes  Grade III/IV a/p inferior GH joint mobs  Manual posterior cuff stretch  Manual lat stretch      neuromuscular re-education activities to improve: Kinesthetic and Proprioception for 25 minutes. The following activities were included:  Prone snow angels at EOM 3x10  G power band row w/ tspine rotation 3x10B  2# Incline Y/T on weight bench with contralat iso hold 3x10 B      Serratus hand heel rock with LT liftoff 3x10  TRX Y/T/W 3x10B  RTB ER isotonics at 90/90 3x10  GTB IR isotonics at 90/90 3x10  Incline pushup at weight bench 4x10  Quadruped KB pull through 3x10B  10# KB OH carry with lunge x 4 laps  Sled push/pull 3 laps + 25#   BB landmine press 3x10B  Standing scaption raise to 90 2# with contralat iso hold 10/10/10  Standing ABD raise to 90 2# with contralat iso hold 10/10/10  Wall ball RS at 90 deg  FE: up/down, side/side, CW, CCW x10 each  Scapular clocks YTB 3x6  Serratus wall slide 3x10  Standing scaption raise from 70->100 degrees (cuing on scap retraction and depression)    therapeutic activities to improve functional performance for 28 minutes, including:  UBE 4' forward, 4' backwards level 6.5 to improve CV endurance and tissue extensibility  Floor press 25# DB 4x8  Inverted row: 2x8 pronated, 2x8 supinated  4# MB UE plyo series: 3x30 each  - chest pass  - OH pass  - diagonals     Patient Education and Home Exercises       Education provided:   - HEP, POC, gym based HEP program, 10% progressive overload principles      Written Home Exercises Provided: yes. Exercises were reviewed and Ed was able to demonstrate them prior to the end of the session.  Ed demonstrated good  understanding of the education provided. See EMR under Patient Instructions for exercises provided during therapy sessions    Assessment   Ed completed session as noted above with progressions made towards gym based functional strengthening program. Overall doing very well with near symmetrical ROM at this time. Continues to demo cuff and periscapular weakness contributing to fatigue easily. Intro'd beginner UE plyo series with no complaints of pain. Needs to continue to work on improving periscap and cuff strength and likely progress through UE power series over next few visits. Discussed proper gym based programming and slow progressive overload principles as he re-integrates back into gym program.       Ed Is progressing well towards his goals.   Pt prognosis is Excellent.     Pt will continue to benefit from skilled outpatient physical therapy to address the deficits listed in the problem list box on initial evaluation, provide pt/family education and to maximize pt's level of independence in the home and community environment.     Pt's spiritual, cultural and educational needs considered and pt agreeable to plan of  care and goals.     Anticipated barriers to physical therapy: none    GOALS: Short Term Goals:  8 weeks  1.Report decreased shoulder pain < / =  4/10 at worst to increase tolerance for working MET  2. Increase PROM to WNL.  MET  3. Pt will be able to tolerate MMT testing and measure at no lower that 4-/5 for flexor, abductors, external rotators and internal rotators. MET   4. Pt to tolerate HEP to improve ROM and independence with ADL's MET     Long Term Goals: 16  weeks  1.Report decreased shoulder pain  < / =  1 /10 at worst to increase tolerance for working. MET  2.Increase AROM to WNL. In progress  3.Increase strength to >/= 4/+5 in areas of limitation to increase tolerance for ADL and work activities.  4. Pt goal: able to return to normal exercise routine with no limitations from shoulder. IN progress  5. Pt will have improved gcode of CJ (20-40% limited) on FOTO shoulder in order to demonstrate true functional improvement. In progress    Plan     Normalize end range ROM and progress functional strengthening. Progress through UE power series    ANILA BUTLER, PT, DPT, SCS

## 2023-08-29 ENCOUNTER — CLINICAL SUPPORT (OUTPATIENT)
Dept: REHABILITATION | Facility: HOSPITAL | Age: 43
End: 2023-08-29
Payer: COMMERCIAL

## 2023-08-29 DIAGNOSIS — G89.29 CHRONIC LEFT SHOULDER PAIN: Primary | ICD-10-CM

## 2023-08-29 DIAGNOSIS — M25.512 CHRONIC LEFT SHOULDER PAIN: Primary | ICD-10-CM

## 2023-08-29 PROCEDURE — 97112 NEUROMUSCULAR REEDUCATION: CPT | Mod: PN

## 2023-08-29 PROCEDURE — 97530 THERAPEUTIC ACTIVITIES: CPT | Mod: PN

## 2023-08-29 NOTE — PROGRESS NOTES
[facility]   Physical Therapy Treatment Note      Name: Ed Osei  Clinic Number: [id number]    Therapy Diagnosis:   Encounter Diagnosis   Name Primary?    Chronic left shoulder pain Yes     Physician: Sincere Beebe III, *    Visit Date: 8/29/2023    Physician Orders: PT Eval and Treat  Medical Diagnosis from Referral: M75.102 (ICD-10-CM) - Non-traumatic rotator cuff tear, left  Evaluation Date: 5/23/2023  Authorization Period Expiration: 6/23/23  Plan of Care Expiration: 10/29/23  Progress Note Due: n/a  Visit # / Visits authorized: 15/ 20  FOTO: 4/4 administered 8/22/23: 75/100=25% limited     Precautions: Standard   DOS 5/9/23     POSTOPERATIVE PLAN: We will follow the arthroscopic rotator cuff repair guidelines for a small size rotator cuff tear.  We discussed with the patient's family after surgery.  The patient will remain in a sling for 6 weeks.  PT to start at 1-2 weeks.     Cuff specific program:  Pendulum exercises and Codman's exercises in 5-7 days, protecting rotator cuff repair for 1 week by avoiding active motion program until 1 week.     PASSIVE ROM: ER side 30 degrees, Forward Flex 90 degrees, ABD - 60 degrees   Full AAROM/PROM starting at 5-7 days as tolerated      OPERATION:   left  1. Shoulder arthroscopic rotator cuff repair (upper 1/8 subscapularis) (CPT 76121) with CuffMend ()  2. Shoulder arthroscopic biceps tenodesis (CPT 03005)  3. Shoulder arthroscopic subacromial decompression, bursectomy   4. Shoulder arthroscopic extensive debridement (anterior, posterior glenohumeral joint, subacromial space) (CPT 99376)  5. Shoulder arthroscopic labral debridement (CPT 71614)  6. Shoulder arthroscopic lysis of adhesions (CPT 12943)    PTA Visit #: 0/5     Time In: 2:24  pm  Time Out: 3:22 pm  Total Billable Time: 58 minutes    Subjective     Pt reports: sore because I went to the gym yesterday. Mainly did core and some low rows but   He was compliant with  home exercise program.  Response to previous treatment: muscle soreness  Functional change: improved motion    Pain: 0/10  Location: left shoulder      Objective      DOS: 5/9/23: 16 weeks 0 days POD as of 8/28/23    Treatment     Shoulder Passive Range of Motion:   Shoulder Right Left*   Flexion   170 170   ER at 0   N/t 80   ER at 90   N/t 90   IR at 90   N/t 60     Shoulder Active Range of Motion:   Shoulder Right Left*   Flexion   175 175   PATY   T2 C7   FIR   T12 T12     L elbow:   Palpation: TTP to L common flexor tendon   Special test: + mills, - Abilio , - Jami's , - spurlings   ULTT: + radial nerve       Ed received the treatments listed below:      therapeutic exercises to develop strength and ROM for 3 minutes including:  Quadruped thoracic rotation 20xB        Not today  SL ER 3x10-15 2#  Seated ER at 90/90 3x10 2#  Side-lying open books 20xB  Seated ER at 90/90 supported on bolster 3x10  Supine dowel ER AAROM 3x10 5s holds  Supine dowel flexion AAROM 3x10 5s holds    manual therapy techniques: Joint mobilizations and Soft tissue Mobilization were applied to the: L shoulder for 5 minutes, including:  Skilled PROM in all planes  Grade III/IV a/p inferior GH joint mobs  Manual posterior cuff stretch  Manual lat stretch      neuromuscular re-education activities to improve: Kinesthetic and Proprioception for 25 minutes. The following activities were included:  Prone snow angels at EOM 3x6  Landmine unilat OH press hold-> reverse lunge 4x8  High plank ->pikes 3x10  Standing scaption raise to 90 5# with contralat iso hold 10/10/10  Standing ABD raise to 90 5# with contralat iso hold 8/8/8    Not today:  2# Incline Y/T on weight bench with contralat iso hold 3x10 B  Serratus hand heel rock with LT liftoff 3x10  TRX Y/T/W 3x10B  RTB ER isotonics at 90/90 3x10  GTB IR isotonics at 90/90 3x10  Quadruped KB pull through 3x10B  10# KB OH carry with lunge x 4 laps  BB landmine press 3x10B  Scapular clocks  YTB 3x6      therapeutic activities to improve functional performance for 25 minutes, including:  UBE 4' forward, 4' backwards level 6.5 to improve CV endurance and tissue extensibility  25# Bent over landmine meadow row 4x8-10  Pushups 4x8      Not today:  Floor press 25# DB 4x8  Inverted row: 2x8 pronated, 2x8 supinated  4# MB UE plyo series: 3x30 each  - chest pass  - OH pass  - diagonals     Patient Education and Home Exercises       Education provided:   - HEP, POC, gym based HEP program, 10% progressive overload principles      Written Home Exercises Provided: yes. Exercises were reviewed and Ed was able to demonstrate them prior to the end of the session.  Ed demonstrated good  understanding of the education provided. See EMR under Patient Instructions for exercises provided during therapy sessions    Assessment   Ed completed session as noted above with progressions made towards gym based functional strengthening program. Overall doing very well with near symmetrical ROM at this time. Continues to demo cuff and periscapular weakness contributing to fatigue easily. Progressed functional strengthening progression and stability based exercises into max FE. Fatigue reported L>R.  Needs to continue to work on improving periscap and cuff strength and likely progress through UE power series over next few visits. Discussed proper gym based programming and slow progressive overload principles as he re-integrates back into gym program. Will also assess UE strength using HH dynamometry over next few sessions      Ed Is progressing well towards his goals.   Pt prognosis is Excellent.     Pt will continue to benefit from skilled outpatient physical therapy to address the deficits listed in the problem list box on initial evaluation, provide pt/family education and to maximize pt's level of independence in the home and community environment.     Pt's spiritual, cultural and educational needs considered and  pt agreeable to plan of care and goals.     Anticipated barriers to physical therapy: none    GOALS: Short Term Goals:  8 weeks  1.Report decreased shoulder pain < / =  4/10 at worst to increase tolerance for working MET  2. Increase PROM to WNL.  MET  3. Pt will be able to tolerate MMT testing and measure at no lower that 4-/5 for flexor, abductors, external rotators and internal rotators. MET   4. Pt to tolerate HEP to improve ROM and independence with ADL's MET     Long Term Goals: 16  weeks  1.Report decreased shoulder pain  < / =  1 /10 at worst to increase tolerance for working. MET  2.Increase AROM to WNL. In progress  3.Increase strength to >/= 4/+5 in areas of limitation to increase tolerance for ADL and work activities.  4. Pt goal: able to return to normal exercise routine with no limitations from shoulder. IN progress  5. Pt will have improved gcode of CJ (20-40% limited) on FOTO shoulder in order to demonstrate true functional improvement. In progress    Plan     Normalize end range ROM and progress functional strengthening. Progress through UE power series    ANIAL BUTLER, PT, DPT, SCS

## 2023-09-05 ENCOUNTER — CLINICAL SUPPORT (OUTPATIENT)
Dept: REHABILITATION | Facility: HOSPITAL | Age: 43
End: 2023-09-05
Payer: COMMERCIAL

## 2023-09-05 DIAGNOSIS — M25.512 CHRONIC LEFT SHOULDER PAIN: Primary | ICD-10-CM

## 2023-09-05 DIAGNOSIS — G89.29 CHRONIC LEFT SHOULDER PAIN: Primary | ICD-10-CM

## 2023-09-05 PROCEDURE — 97112 NEUROMUSCULAR REEDUCATION: CPT | Mod: PN

## 2023-09-05 PROCEDURE — 97530 THERAPEUTIC ACTIVITIES: CPT | Mod: PN

## 2023-09-05 NOTE — PROGRESS NOTES
[facility]   Physical Therapy Treatment Note      Name: Ed Osei  Clinic Number: [id number]    Therapy Diagnosis:   Encounter Diagnosis   Name Primary?    Chronic left shoulder pain Yes     Physician: Sincere Beebe III, *    Visit Date: 9/5/2023    Physician Orders: PT Eval and Treat  Medical Diagnosis from Referral: M75.102 (ICD-10-CM) - Non-traumatic rotator cuff tear, left  Evaluation Date: 5/23/2023  Authorization Period Expiration: 6/23/23  Plan of Care Expiration: 10/29/23  Progress Note Due: n/a  Visit # / Visits authorized: 15/ 20  FOTO: 4/4 administered 8/22/23: 75/100=25% limited     Precautions: Standard   DOS 5/9/23     POSTOPERATIVE PLAN: We will follow the arthroscopic rotator cuff repair guidelines for a small size rotator cuff tear.  We discussed with the patient's family after surgery.  The patient will remain in a sling for 6 weeks.  PT to start at 1-2 weeks.     Cuff specific program:  Pendulum exercises and Codman's exercises in 5-7 days, protecting rotator cuff repair for 1 week by avoiding active motion program until 1 week.     PASSIVE ROM: ER side 30 degrees, Forward Flex 90 degrees, ABD - 60 degrees   Full AAROM/PROM starting at 5-7 days as tolerated      OPERATION:   left  1. Shoulder arthroscopic rotator cuff repair (upper 1/8 subscapularis) (CPT 57200) with CuffMend ()  2. Shoulder arthroscopic biceps tenodesis (CPT 19619)  3. Shoulder arthroscopic subacromial decompression, bursectomy   4. Shoulder arthroscopic extensive debridement (anterior, posterior glenohumeral joint, subacromial space) (CPT 11974)  5. Shoulder arthroscopic labral debridement (CPT 73885)  6. Shoulder arthroscopic lysis of adhesions (CPT 01898)    PTA Visit #: 0/5     Time In: 2:24  pm  Time Out: 3:22 pm  Total Billable Time: 58 minutes    Subjective     Pt reports: sore because I went to the gym yesterday. Mainly did core and some low rows but   He was compliant with  home exercise program.  Response to previous treatment: muscle soreness  Functional change: improved motion    Pain: 0/10  Location: left shoulder      Objective      DOS: 5/9/23: 16 weeks 0 days POD as of 8/28/23    Treatment     Shoulder Passive Range of Motion:   Shoulder Right Left*   Flexion   170 170   ER at 0   N/t 80   ER at 90   N/t 90   IR at 90   N/t 60     Shoulder Active Range of Motion:   Shoulder Right Left*   Flexion   175 175   PATY   T2 C7   FIR   T12 T12     L elbow:   Palpation: TTP to L common flexor tendon   Special test: + mills, - Abilio , - Enochs , - spurlings   ULTT: + radial nerve       Ed received the treatments listed below:      therapeutic exercises to develop strength and ROM for 3 minutes including:  Quadruped thoracic rotation 20xB        Not today  SL ER 3x10-15 2#  Seated ER at 90/90 3x10 2#  Side-lying open books 20xB  Seated ER at 90/90 supported on bolster 3x10  Supine dowel ER AAROM 3x10 5s holds  Supine dowel flexion AAROM 3x10 5s holds    manual therapy techniques: Joint mobilizations and Soft tissue Mobilization were applied to the: L shoulder for 0 minutes, including:  Skilled PROM in all planes  Grade III/IV a/p inferior GH joint mobs  Manual posterior cuff stretch  Manual lat stretch      neuromuscular re-education activities to improve: Kinesthetic and Proprioception for 25 minutes. The following activities were included:  Hanging rows 4x12,10,9,9  TRX Y/T/W 3x10B  KB pull through in plank position 2x10 10#  Overhead carries with monster band and 10# + lunges - 3 laps     Not today:  Landmine unilat OH press hold-> reverse lunge 4x8  High plank ->pikes 3x10  Standing scaption raise to 90 5# with contralat iso hold 10/10/10  Standing ABD raise to 90 5# with contralat iso hold 8/8/8  2# Incline Y/T on weight bench with contralat iso hold 3x10 B  Serratus hand heel rock with LT liftoff 3x10  RTB ER isotonics at 90/90 3x10  GTB IR isotonics at 90/90 3x10  Quadruped  KB pull through 3x10B  10# KB OH carry with lunge x 4 laps  BB landmine press 3x10B  Scapular clocks YTB 3x6      therapeutic activities to improve functional performance for 32 minutes, including:  UBE 4' forward, 4' backwards level 6.5 to improve CV endurance and tissue extensibility  Arnold press 10# KB's 3x6  Floor press 20# DB 4x8  11# MB UE plyo series: 3x30 each  - chest pass        Not today:  Inverted row: 2x8 pronated, 2x8 supinated  Patient Education and Home Exercises       Education provided:   - HEP, POC, gym based HEP program, 10% progressive overload principles      Written Home Exercises Provided: yes. Exercises were reviewed and Ed was able to demonstrate them prior to the end of the session.  Ed demonstrated good  understanding of the education provided. See EMR under Patient Instructions for exercises provided during therapy sessions    Assessment   Ed is doing well with near full mobility on L compared to R we are slowly able to incorporate gym-specific exercises into his routine with no pain reported. He continues to be challenged with pushing exercises such as floor press and overhead press, but able to complete with no pain and good form. Also able to progress to heavier plyo ball today. Will benefit from D strength testing in the near future.       Ed Is progressing well towards his goals.   Pt prognosis is Excellent.     Pt will continue to benefit from skilled outpatient physical therapy to address the deficits listed in the problem list box on initial evaluation, provide pt/family education and to maximize pt's level of independence in the home and community environment.     Pt's spiritual, cultural and educational needs considered and pt agreeable to plan of care and goals.     Anticipated barriers to physical therapy: none    GOALS: Short Term Goals:  8 weeks  1.Report decreased shoulder pain < / =  4/10 at worst to increase tolerance for working MET  2. Increase PROM  to WNL.  MET  3. Pt will be able to tolerate MMT testing and measure at no lower that 4-/5 for flexor, abductors, external rotators and internal rotators. MET   4. Pt to tolerate HEP to improve ROM and independence with ADL's MET     Long Term Goals: 16  weeks  1.Report decreased shoulder pain  < / =  1 /10 at worst to increase tolerance for working. MET  2.Increase AROM to WNL. In progress  3.Increase strength to >/= 4/+5 in areas of limitation to increase tolerance for ADL and work activities.  4. Pt goal: able to return to normal exercise routine with no limitations from shoulder. IN progress  5. Pt will have improved gcode of CJ (20-40% limited) on FOTO shoulder in order to demonstrate true functional improvement. In progress    Plan     Normalize end range ROM and progress functional strengthening. Progress through MovieSet power series    Arturo Ash, PT, DPT

## 2023-09-12 ENCOUNTER — CLINICAL SUPPORT (OUTPATIENT)
Dept: REHABILITATION | Facility: HOSPITAL | Age: 43
End: 2023-09-12
Payer: COMMERCIAL

## 2023-09-12 DIAGNOSIS — M25.512 CHRONIC LEFT SHOULDER PAIN: Primary | ICD-10-CM

## 2023-09-12 DIAGNOSIS — G89.29 CHRONIC LEFT SHOULDER PAIN: Primary | ICD-10-CM

## 2023-09-12 PROCEDURE — 97530 THERAPEUTIC ACTIVITIES: CPT | Mod: PN

## 2023-09-12 PROCEDURE — 97112 NEUROMUSCULAR REEDUCATION: CPT | Mod: PN

## 2023-09-12 PROCEDURE — 97750 PHYSICAL PERFORMANCE TEST: CPT | Mod: PN

## 2023-09-12 NOTE — PROGRESS NOTES
[facility]   Physical Therapy Treatment Note      Name: Ed Osei  Clinic Number: [id number]    Therapy Diagnosis:   Encounter Diagnosis   Name Primary?    Chronic left shoulder pain Yes       Physician: Sincere Beebe III, *    Visit Date: 9/12/2023    Physician Orders: PT Eval and Treat  Medical Diagnosis from Referral: M75.102 (ICD-10-CM) - Non-traumatic rotator cuff tear, left  Evaluation Date: 5/23/2023  Authorization Period Expiration: 6/23/23  Plan of Care Expiration: 10/29/23  Progress Note Due: n/a  Visit # / Visits authorized: 17/ 20  FOTO: 4/4 administered 8/22/23: 75/100=25% limited     Precautions: Standard   DOS 5/9/23     POSTOPERATIVE PLAN: We will follow the arthroscopic rotator cuff repair guidelines for a small size rotator cuff tear.  We discussed with the patient's family after surgery.  The patient will remain in a sling for 6 weeks.  PT to start at 1-2 weeks.     Cuff specific program:  Pendulum exercises and Codman's exercises in 5-7 days, protecting rotator cuff repair for 1 week by avoiding active motion program until 1 week.     PASSIVE ROM: ER side 30 degrees, Forward Flex 90 degrees, ABD - 60 degrees   Full AAROM/PROM starting at 5-7 days as tolerated      OPERATION:   left  1. Shoulder arthroscopic rotator cuff repair (upper 1/8 subscapularis) (CPT 88506) with CuffMend ()  2. Shoulder arthroscopic biceps tenodesis (CPT 94991)  3. Shoulder arthroscopic subacromial decompression, bursectomy   4. Shoulder arthroscopic extensive debridement (anterior, posterior glenohumeral joint, subacromial space) (CPT 72169)  5. Shoulder arthroscopic labral debridement (CPT 85459)  6. Shoulder arthroscopic lysis of adhesions (CPT 53404)    PTA Visit #: 0/5     Time In: 2:13  pm  Time Out: 3:15 pm  Total Billable Time: 62 minutes    Subjective     Pt reports: been going to the gym and pretty much replicating exercises we do in here with minimal issues  He was  compliant with home exercise program.  Response to previous treatment: muscle soreness  Functional change: improved motion    Pain: 0/10  Location: left shoulder      Objective      DOS: 23: 18 weeks 0 days POD as of 23    Treatment     Shoulder Passive Range of Motion:   Shoulder Right Left*   Flexion   170 170   ER at 0   N/t 80   ER at 90   N/t 90   IR at 90   N/t 60     Shoulder Active Range of Motion:   Shoulder Right Left*   Flexion   175 175   PATY   T2 C7   FIR   T12 T12     L elbow:   Palpation: TTP to L common flexor tendon   Special test: + mills, - Abilio , - Jami's , - spurlings   ULTT: + radial nerve     Objective Testin23    Strength: measured via HH dynamometer in pounds   Right Left Deficit %   Scaption 13.1, 12.6, 14.7  Average: 13.5# 12.5, 12.3, 14.6  Average: 13.1# 3% deficit   Shoulder ER at side 19.8, 18.0, 17.3  Average: 18.4# 20.6. 19.4, 19.2  Average: 19.7# 7% stronger   Shoulder ER at 90-90 12.2, 11.3, 12.1  Average: 11.9# 19.4, 20.8, 18.2  Average: 19.5# 39% stronger   Shoulder IR at 90-90 34, 22.7, 28.4  Average: 28.4# 22.9, 20.6, 21.3  Average: 21.6# 24% deficit           Ed received the treatments listed below:      therapeutic exercises to develop strength and ROM for 3 minutes including:  Quadruped thoracic rotation 20xB        Not today  SL ER 3x10-15 2#  Seated ER at 90/90 3x10 2#  Side-lying open books 20xB  Seated ER at 90/90 supported on bolster 3x10  Supine dowel ER AAROM 3x10 5s holds  Supine dowel flexion AAROM 3x10 5s holds    manual therapy techniques: Joint mobilizations and Soft tissue Mobilization were applied to the: L shoulder for 3 minutes, including:  Shoulder assessment  Skilled PROM in all planes  Grade III/IV a/p inferior GH joint mobs  Manual posterior cuff stretch  Manual lat stretch      neuromuscular re-education activities to improve: Kinesthetic and Proprioception for 26 minutes. The following activities were included:  Double BTB IR @  90/90 4x10-15  Plank med ball up and overs 3x5 each  5# standing scaption raise 10/10/10  5# standing ABD raise 10/10/10    Not today:  Hanging rows 4x12,10,9,9  TRX Y/T/W 3x10B  KB pull through in plank position 2x10 10#  Overhead carries with monster band and 10# + lunges - 3 laps   Landmine unilat OH press hold-> reverse lunge 4x8  High plank ->pikes 3x10  2# Incline Y/T on weight bench with contralat iso hold 3x10 B  Serratus hand heel rock with LT liftoff 3x10  RTB ER isotonics at 90/90 3x10  GTB IR isotonics at 90/90 3x10  Quadruped KB pull through 3x10B  10# KB OH carry with lunge x 4 laps  BB landmine press 3x10B  Scapular clocks YTB 3x6      therapeutic activities to improve functional performance for 30 minutes, including:  UBE 4' forward, 4' backwards level 7 to improve CV endurance and tissue extensibility  Physical performance test: HH dynamometry testing  Alternating MB pushup 3x6  7# free motion cable chest fly 4x10      Not today:  6# MB UE plyo series: 3x30 each  - chest pass  Arnold press 10# KB's 3x6  Floor press 20# DB 4x8  Inverted row: 2x8 pronated, 2x8 supinated  Patient Education and Home Exercises       Education provided:   - HEP, POC, gym based HEP program, 10% progressive overload principles      Written Home Exercises Provided: yes. Exercises were reviewed and Ed was able to demonstrate them prior to the end of the session.  Ed demonstrated good  understanding of the education provided. See EMR under Patient Instructions for exercises provided during therapy sessions    Assessment   Ed completed session as noted above with additional objective testing performed to assess UE LSI using hand held dynamometry. Upon assessment demonstrates excellent LSI in all motions other than IR at 90/90 position which is not unexpected 2/2 surgical procedure. Continued with functional strengthening progressions during session today with good response noted. Overall is doing very will will  continue to progress as tolerated in order to transition towards gym based HEP over next few weeks. Discussed decreasing frequency of PT visits to once every other week at this time in which patient agreed      Ed Is progressing well towards his goals.   Pt prognosis is Excellent.     Pt will continue to benefit from skilled outpatient physical therapy to address the deficits listed in the problem list box on initial evaluation, provide pt/family education and to maximize pt's level of independence in the home and community environment.     Pt's spiritual, cultural and educational needs considered and pt agreeable to plan of care and goals.     Anticipated barriers to physical therapy: none    GOALS: Short Term Goals:  8 weeks  1.Report decreased shoulder pain < / =  4/10 at worst to increase tolerance for working MET  2. Increase PROM to WNL.  MET  3. Pt will be able to tolerate MMT testing and measure at no lower that 4-/5 for flexor, abductors, external rotators and internal rotators. MET   4. Pt to tolerate HEP to improve ROM and independence with ADL's MET     Long Term Goals: 16  weeks  1.Report decreased shoulder pain  < / =  1 /10 at worst to increase tolerance for working. MET  2.Increase AROM to WNL. In progress  3.Increase strength to >/= 4/+5 in areas of limitation to increase tolerance for ADL and work activities.  4. Pt goal: able to return to normal exercise routine with no limitations from shoulder. IN progress  5. Pt will have improved gcode of CJ (20-40% limited) on FOTO shoulder in order to demonstrate true functional improvement. In progress    Plan   Progress gym based functional strengthening in prep for discharge planning      ANILA BUTLER, PT, DPT, SCS

## 2023-09-19 ENCOUNTER — CLINICAL SUPPORT (OUTPATIENT)
Dept: REHABILITATION | Facility: HOSPITAL | Age: 43
End: 2023-09-19
Payer: COMMERCIAL

## 2023-09-19 DIAGNOSIS — G89.29 CHRONIC LEFT SHOULDER PAIN: Primary | ICD-10-CM

## 2023-09-19 DIAGNOSIS — M25.512 CHRONIC LEFT SHOULDER PAIN: Primary | ICD-10-CM

## 2023-09-19 PROCEDURE — 97530 THERAPEUTIC ACTIVITIES: CPT | Mod: PN

## 2023-09-19 NOTE — PROGRESS NOTES
[facility]   Physical Therapy Treatment Note/Discharge note     Name: Ed Osei  Clinic Number: [id number]    Therapy Diagnosis:   Encounter Diagnosis   Name Primary?    Chronic left shoulder pain Yes       Physician: Sincere Beebe III, *    Visit Date: 9/19/2023    Physician Orders: PT Eval and Treat  Medical Diagnosis from Referral: M75.102 (ICD-10-CM) - Non-traumatic rotator cuff tear, left  Evaluation Date: 5/23/2023  Authorization Period Expiration: 6/23/23  Plan of Care Expiration: 10/29/23  Progress Note Due: n/a  Visit # / Visits authorized: 18/ 20  FOTO: 4/4 administered 8/22/23: 75/100=25% limited     Precautions: Standard   DOS 5/9/23     POSTOPERATIVE PLAN: We will follow the arthroscopic rotator cuff repair guidelines for a small size rotator cuff tear.  We discussed with the patient's family after surgery.  The patient will remain in a sling for 6 weeks.  PT to start at 1-2 weeks.     Cuff specific program:  Pendulum exercises and Codman's exercises in 5-7 days, protecting rotator cuff repair for 1 week by avoiding active motion program until 1 week.     PASSIVE ROM: ER side 30 degrees, Forward Flex 90 degrees, ABD - 60 degrees   Full AAROM/PROM starting at 5-7 days as tolerated      OPERATION:   left  1. Shoulder arthroscopic rotator cuff repair (upper 1/8 subscapularis) (CPT 07691) with CuffMend ()  2. Shoulder arthroscopic biceps tenodesis (CPT 58135)  3. Shoulder arthroscopic subacromial decompression, bursectomy   4. Shoulder arthroscopic extensive debridement (anterior, posterior glenohumeral joint, subacromial space) (CPT 47550)  5. Shoulder arthroscopic labral debridement (CPT 66625)  6. Shoulder arthroscopic lysis of adhesions (CPT 77843)    PTA Visit #: 0/5     Time In: 2:05  pm  Time Out: 3:35 pm  Total Billable Time: 30 minutes    Subjective     Pt reports: no complaints I feel like I am ready for discharge  He was compliant with home exercise  program.  Response to previous treatment: muscle soreness  Functional change: improved motion    Pain: 0/10  Location: left shoulder      Objective      DOS: 23: 19 weeks 0 days POD as of 23    Treatment     Shoulder Passive Range of Motion:   Shoulder Right Left*   Flexion   170 170   ER at 0   N/t 80   ER at 90   N/t 90   IR at 90   N/t 60     Shoulder Active Range of Motion:   Shoulder Right Left*   Flexion   175 175   PATY   T2 C7   FIR   T12 T12       Objective Testin23    Strength: measured via HH dynamometer in pounds   Right Left Deficit %   Scaption 13.1, 12.6, 14.7  Average: 13.5# 12.5, 12.3, 14.6  Average: 13.1# 3% deficit   Shoulder ER at side 19.8, 18.0, 17.3  Average: 18.4# 20.6. 19.4, 19.2  Average: 19.7# 7% stronger   Shoulder ER at 90-90 12.2, 11.3, 12.1  Average: 11.9# 19.4, 20.8, 18.2  Average: 19.5# 39% stronger   Shoulder IR at 90-90 34, 22.7, 28.4  Average: 28.4# 22.9, 20.6, 21.3  Average: 21.6# 24% deficit           Ed received the treatments listed below:      therapeutic exercises to develop strength and ROM for 5 minutes including:  ROM assessment  Quadruped thoracic rotation 20xB    therapeutic activities to improve functional performance for 30 minutes, including:  UBE 4' forward, 4' backwards level 7 to improve CV endurance and tissue extensibility  Alternating plyo MB pushup 3x6  UE power series:   Supine MB chest pass 4x6  Seated MB shot put throw 4x6    Patient Education and Home Exercises       Education provided:   - HEP, POC, gym based HEP program, 10% progressive overload principles, discharge instructions      Written Home Exercises Provided: yes. Exercises were reviewed and Ed was able to demonstrate them prior to the end of the session.  Ed demonstrated good  understanding of the education provided. See EMR under Patient Instructions for exercises provided during therapy sessions    Assessment   Ed completed session as noted above with full  symmetry of ROM and good UE strength testing values noted from previous session. At this time patient has met all previously established goals and is no longer functionally limited and is appropriate for discharge at this time. Patient is very satisfied with current shoulder function at this time and requires min cues for appropriate exercises performance. Progressed to UE power series with no issues. At this time patient will be discharged from PT and can follow up PRN. Discussed transition to gym based HEP and provided him with my business card and encouraged to reach out if any questions arise.       GOALS: Short Term Goals:  8 weeks  1.Report decreased shoulder pain < / =  4/10 at worst to increase tolerance for working MET  2. Increase PROM to WNL.  MET  3. Pt will be able to tolerate MMT testing and measure at no lower that 4-/5 for flexor, abductors, external rotators and internal rotators. MET   4. Pt to tolerate HEP to improve ROM and independence with ADL's MET     Long Term Goals: 16  weeks  1.Report decreased shoulder pain  < / =  1 /10 at worst to increase tolerance for working. MET  2.Increase AROM to WNL. MET  3.Increase strength to >/= 4/+5 in areas of limitation to increase tolerance for ADL and work activities. MET  4. Pt goal: able to return to normal exercise routine with no limitations from shoulder. MET  5. Pt will have improved gcode of CJ (20-40% limited) on FOTO shoulder in order to demonstrate true functional improvement. MET    Plan   Discharge with continuation of gym based HEP  Follow up PRN    ANILA BUTLER, PT, DPT, SCS  Board Certified Sports Clinical Specialist

## 2023-10-23 ENCOUNTER — OFFICE VISIT (OUTPATIENT)
Dept: SPORTS MEDICINE | Facility: CLINIC | Age: 43
End: 2023-10-23
Payer: COMMERCIAL

## 2023-10-23 VITALS
DIASTOLIC BLOOD PRESSURE: 70 MMHG | HEART RATE: 55 BPM | SYSTOLIC BLOOD PRESSURE: 106 MMHG | BODY MASS INDEX: 26.21 KG/M2 | WEIGHT: 162.38 LBS

## 2023-10-23 DIAGNOSIS — Z98.890 S/P SHOULDER SURGERY: Primary | ICD-10-CM

## 2023-10-23 PROCEDURE — 3008F BODY MASS INDEX DOCD: CPT | Mod: CPTII,S$GLB,, | Performed by: ORTHOPAEDIC SURGERY

## 2023-10-23 PROCEDURE — 3008F PR BODY MASS INDEX (BMI) DOCUMENTED: ICD-10-PCS | Mod: CPTII,S$GLB,, | Performed by: ORTHOPAEDIC SURGERY

## 2023-10-23 PROCEDURE — 3044F HG A1C LEVEL LT 7.0%: CPT | Mod: CPTII,S$GLB,, | Performed by: ORTHOPAEDIC SURGERY

## 2023-10-23 PROCEDURE — 3078F PR MOST RECENT DIASTOLIC BLOOD PRESSURE < 80 MM HG: ICD-10-PCS | Mod: CPTII,S$GLB,, | Performed by: ORTHOPAEDIC SURGERY

## 2023-10-23 PROCEDURE — 3074F SYST BP LT 130 MM HG: CPT | Mod: CPTII,S$GLB,, | Performed by: ORTHOPAEDIC SURGERY

## 2023-10-23 PROCEDURE — 99999 PR PBB SHADOW E&M-EST. PATIENT-LVL III: ICD-10-PCS | Mod: PBBFAC,,, | Performed by: ORTHOPAEDIC SURGERY

## 2023-10-23 PROCEDURE — 1159F MED LIST DOCD IN RCRD: CPT | Mod: CPTII,S$GLB,, | Performed by: ORTHOPAEDIC SURGERY

## 2023-10-23 PROCEDURE — 3044F PR MOST RECENT HEMOGLOBIN A1C LEVEL <7.0%: ICD-10-PCS | Mod: CPTII,S$GLB,, | Performed by: ORTHOPAEDIC SURGERY

## 2023-10-23 PROCEDURE — 3078F DIAST BP <80 MM HG: CPT | Mod: CPTII,S$GLB,, | Performed by: ORTHOPAEDIC SURGERY

## 2023-10-23 PROCEDURE — 3074F PR MOST RECENT SYSTOLIC BLOOD PRESSURE < 130 MM HG: ICD-10-PCS | Mod: CPTII,S$GLB,, | Performed by: ORTHOPAEDIC SURGERY

## 2023-10-23 PROCEDURE — 99214 PR OFFICE/OUTPT VISIT, EST, LEVL IV, 30-39 MIN: ICD-10-PCS | Mod: S$GLB,,, | Performed by: ORTHOPAEDIC SURGERY

## 2023-10-23 PROCEDURE — 99999 PR PBB SHADOW E&M-EST. PATIENT-LVL III: CPT | Mod: PBBFAC,,, | Performed by: ORTHOPAEDIC SURGERY

## 2023-10-23 PROCEDURE — 1159F PR MEDICATION LIST DOCUMENTED IN MEDICAL RECORD: ICD-10-PCS | Mod: CPTII,S$GLB,, | Performed by: ORTHOPAEDIC SURGERY

## 2023-10-23 PROCEDURE — 99214 OFFICE O/P EST MOD 30 MIN: CPT | Mod: S$GLB,,, | Performed by: ORTHOPAEDIC SURGERY

## 2023-10-23 NOTE — PROGRESS NOTES
Chief Complaint: Left shoulder pain    HISTORY OF PRESENT ILLNESS:   Pt is here today for post-operative followup of shoulder arthroscopy.  he is doing well.  We have reviewed patient's findings and discussed plan of care and future treatment options.      No pain. Working with [employer] in PT and doing well with that  Shoulder has been feeling really good.   No other issues reported.     He is back at work with no issues.     Pain at a 0/10.     SANE: 90  SANE preop: 20    DATE OF PROCEDURE: 05/09/2023     SURGEON: Tori Davis M.D.  OPERATION:   left  1. Shoulder arthroscopic rotator cuff repair (upper 1/8 subscapularis) (CPT 87339) with [employer] ()  2. Shoulder arthroscopic biceps tenodesis (CPT 25062)  3. Shoulder arthroscopic subacromial decompression, bursectomy   4. Shoulder arthroscopic extensive debridement (anterior, posterior glenohumeral joint, subacromial space) (CPT 71157)  5. Shoulder arthroscopic labral debridement (CPT 23737)  6. Shoulder arthroscopic lysis of adhesions (CPT 88814)     Review of Systems   Constitution: Negative. Negative for chills, fever and night sweats.   HENT: Negative for congestion and headaches.    Eyes: Negative for blurred vision, left vision loss and right vision loss.   Cardiovascular: Negative for chest pain and syncope.   Respiratory: Negative for cough and shortness of breath.    Endocrine: Negative for polydipsia, polyphagia and polyuria.   Hematologic/Lymphatic: Negative for bleeding problem. Does not bruise/bleed easily.   Skin: Negative for dry skin, itching and rash.   Musculoskeletal: Negative for falls and muscle weakness.   Gastrointestinal: Negative for abdominal pain and bowel incontinence.   Genitourinary: Negative for bladder incontinence and nocturia.   Neurological: Negative for disturbances in coordination, loss of balance and seizures.   Psychiatric/Behavioral: Negative for depression. The patient does not have insomnia.    Allergic/Immunologic:  Negative for hives and persistent infections.                                                                     PAST MEDICAL HISTORY:   Past Medical History:   Diagnosis Date    PONV (postoperative nausea and vomiting)      PAST SURGICAL HISTORY:   Past Surgical History:   Procedure Laterality Date    ACHILLES TENDON SURGERY  2015    ADENOIDECTOMY      ARTHROSCOPIC REPAIR OF ROTATOR CUFF OF SHOULDER Left 5/9/2023    Procedure: REPAIR/PATCH, ROTATOR CUFF, ARTHROSCOPIC;  Surgeon: Tori Davis MD;  Location: [facility] OR;  Service: Orthopedics;  Laterality: Left;    ARTHROSCOPY,SHOULDER,WITH BICEPS TENODESIS Left 5/9/2023    Procedure: ARTHROSCOPY,SHOULDER,WITH BICEPS TENODESIS;  Surgeon: Tori Davis MD;  Location: [facility] OR;  Service: Orthopedics;  Laterality: Left;    DECOMPRESSION OF SUBACROMIAL SPACE Left 5/9/2023    Procedure: DECOMPRESSION, SUBACROMIAL SPACE;  Surgeon: Tori Davis MD;  Location: [facility] OR;  Service: Orthopedics;  Laterality: Left;    SHOULDER ARTHROSCOPY      SINUS SURGERY      x2    WISDOM TOOTH EXTRACTION  [id number]= while in High School.     FAMILY HISTORY:   Family History   Problem Relation Age of Onset    No Known Problems Father     Hyperlipidemia Mother     Hypertension Mother     Cataracts Mother     No Known Problems Maternal Aunt     No Known Problems Maternal Uncle     No Known Problems Paternal Aunt     No Known Problems Paternal Uncle     No Known Problems Paternal Grandmother     No Known Problems Paternal Grandfather     No Known Problems Maternal Grandmother     No Known Problems Maternal Grandfather     No Known Problems Sister     Hyperlipidemia Brother     Hypertension Brother     Amblyopia Neg Hx     Blindness Neg Hx     Cancer Neg Hx     Diabetes Neg Hx     Glaucoma Neg Hx     Macular degeneration Neg Hx     Retinal detachment Neg Hx     Strabismus Neg Hx     Stroke Neg Hx     Thyroid disease Neg Hx      SOCIAL HISTORY:   Social History     Socioeconomic History    Marital status:     Tobacco Use    Smoking status: Never    Smokeless tobacco: Never   Substance and Sexual Activity    Alcohol use: Yes     Alcohol/week: 5.0 standard drinks of alcohol     Types: 6 Standard drinks or equivalent per week     Comment: occ.    Drug use: No    Sexual activity: Yes     Partners: Female       MEDICATIONS:   Current Outpatient Medications:     dutasteride (AVODART) 0.5 mg capsule, , Disp: , Rfl:     finasteride (PROSCAR) 5 mg tablet, Take 5 mg by mouth., Disp: , Rfl:     aspirin (ECOTRIN) 81 MG EC tablet, Take 1 tablet (81 mg total) by mouth once daily. For 4 weeks starting the day after surgery. (Patient not taking: Reported on 10/23/2023), Disp: 28 tablet, Rfl: 0    celecoxib (CELEBREX) 200 MG capsule, Take 1 capsule (200 mg total) by mouth 2 (two) times daily. (Patient not taking: Reported on 10/23/2023), Disp: 30 capsule, Rfl: 0    oxyCODONE-acetaminophen (PERCOCET)  mg per tablet, Take 1 tablet by mouth every 4-6 hours as needed for pain. Take stool softener with this medication. (Patient not taking: Reported on 10/23/2023), Disp: 10 tablet, Rfl: 0    promethazine (PHENERGAN) 25 MG tablet, Take 1 tablet (25 mg total) by mouth every 6 (six) hours as needed for Nausea. (Patient not taking: Reported on 10/23/2023), Disp: 21 tablet, Rfl: 0    traMADoL (ULTRAM) 50 mg tablet, Take 1-2 tablets ( mg total) by mouth every 6 (six) hours as needed for Pain. (Patient not taking: Reported on 10/23/2023), Disp: 21 tablet, Rfl: 0  ALLERGIES: Review of patient's allergies indicates:  No Known Allergies    VITAL SIGNS: /70   Pulse (!) 55   Wt 73.6 kg (162 lb 5.9 oz)   BMI 26.21 kg/m²                      PHYSICAL EXAMINATION:     Incision sites healed well  No evidence of any erythema, infection or induration  elbow Range of motion full   Minimal effusion  2+ Radial pulses  No swelling  ROM: FF-175  ER-55  IR- T10    [place] Strength 5/5 at 0 and 30  ER 5/5    No TTP  Negative anupama                                                                                ASSESSMENT:                                                                                                                                               1. Status post above, doing well.                                                                                                                               PLAN:                                                                                                                                                     1. PT to continue  2. Emphasized scapular function.  3. I have discussed return to activity in detail.  4. Patient will see us back in 12 weeks.                                    5. Discussed case with PT.    All questions were answered, surgical technique was reviewed and interpreted, and patient should contact us with any questions or concerns in the interim.

## 2024-01-30 ENCOUNTER — CLINICAL SUPPORT (OUTPATIENT)
Dept: OTHER | Facility: CLINIC | Age: 44
End: 2024-01-30

## 2024-01-30 DIAGNOSIS — Z00.8 ENCOUNTER FOR OTHER GENERAL EXAMINATION: ICD-10-CM

## 2024-01-31 VITALS
BODY MASS INDEX: 24.91 KG/M2 | DIASTOLIC BLOOD PRESSURE: 77 MMHG | WEIGHT: 155 LBS | SYSTOLIC BLOOD PRESSURE: 112 MMHG | HEIGHT: 66 IN

## 2024-01-31 LAB
GLUCOSE SERPL-MCNC: 86 MG/DL (ref 60–140)
HDLC SERPL-MCNC: 45 MG/DL
POC CHOLESTEROL, LDL (DOCK): 115 MG/DL
POC CHOLESTEROL, TOTAL: 172 MG/DL
TRIGL SERPL-MCNC: 59 MG/DL

## 2024-03-13 ENCOUNTER — PATIENT MESSAGE (OUTPATIENT)
Dept: SPORTS MEDICINE | Facility: CLINIC | Age: 44
End: 2024-03-13
Payer: COMMERCIAL

## 2024-07-01 ENCOUNTER — OFFICE VISIT (OUTPATIENT)
Dept: PULMONOLOGY | Facility: CLINIC | Age: 44
End: 2024-07-01
Payer: COMMERCIAL

## 2024-07-01 VITALS
BODY MASS INDEX: 26.13 KG/M2 | HEART RATE: 59 BPM | WEIGHT: 162.56 LBS | HEIGHT: 66 IN | OXYGEN SATURATION: 97 % | DIASTOLIC BLOOD PRESSURE: 75 MMHG | SYSTOLIC BLOOD PRESSURE: 113 MMHG

## 2024-07-01 DIAGNOSIS — G47.33 OSA (OBSTRUCTIVE SLEEP APNEA): Primary | ICD-10-CM

## 2024-07-01 DIAGNOSIS — R09.81 NASAL CONGESTION: ICD-10-CM

## 2024-07-01 PROCEDURE — 1159F MED LIST DOCD IN RCRD: CPT | Mod: CPTII,S$GLB,, | Performed by: INTERNAL MEDICINE

## 2024-07-01 PROCEDURE — 3078F DIAST BP <80 MM HG: CPT | Mod: CPTII,S$GLB,, | Performed by: INTERNAL MEDICINE

## 2024-07-01 PROCEDURE — 99204 OFFICE O/P NEW MOD 45 MIN: CPT | Mod: S$GLB,,, | Performed by: INTERNAL MEDICINE

## 2024-07-01 PROCEDURE — 3074F SYST BP LT 130 MM HG: CPT | Mod: CPTII,S$GLB,, | Performed by: INTERNAL MEDICINE

## 2024-07-01 PROCEDURE — 99999 PR PBB SHADOW E&M-EST. PATIENT-LVL III: CPT | Mod: PBBFAC,,, | Performed by: INTERNAL MEDICINE

## 2024-07-01 PROCEDURE — 3008F BODY MASS INDEX DOCD: CPT | Mod: CPTII,S$GLB,, | Performed by: INTERNAL MEDICINE

## 2024-07-01 NOTE — PATIENT INSTRUCTIONS
Your provider has scheduled you a Sleep Study    What you need to know:    This referral will be sent to your insurance for authorization.  Depending on your insurance company, this process may take two weeks to be authorized.    Once your study has been authorized, Some one from the sleep lab will contact you to schedule your sleep study.   Their number is [phone]. The [facility] is located on 2nd floor of [facility].    We will call you when the sleep study results are ready - if you have not heard from us by 2 weeks from the date of the study, please call [phone].    For information regarding financial obligations, please call [facility] at [phone].    If you study is already scheduled, please call [phone].    Once your study has been completed, it will take up to 14 business days for the results to be sent back to your provider.    If you have any questions you can send a message through your [facility] account, or call the clinic at [phone].    You are advised to abstain from driving should you feel sleepy or drowsy.

## 2024-07-01 NOTE — PROGRESS NOTES
Ed Osei  was seen as a new patient for the evaluation of  tylor.    CHIEF COMPLAINT:    Chief Complaint   Patient presents with    Apnea       HISTORY OF PRESENT ILLNESS: Ed Osei is a 43 y.o. male is here for sleep evaluation.   Patient was told to have apnea during sleep by wife.  Patient works 4 nights shift (8 pm to 8 am) every 2 months.      STOPBANG during initial visit:  Snore:  loud that is worse in supine position  Tire:  rested  Observed apnea:  wife noticed apnea during sleep  Pressure (HTN):  denied   BMI:  Body mass index is 26.24 kg/m².   Age:  43 y.o.  Neck (inch):  15  Gender:  male    Other sleep ROS:  no parasomnia.  No cataplexy.  No rls symptoms.    [place] Sleepiness Scale score during initial sleep evaluation was 0.    SLEEP ROUTINE:  Activity the hour prior to sleep: watch tv and put children to bed  Bed partner:  wife  Time to bed:  11 pm   Lights off:  off   Sleep onset latency:  2 minutes        Disruptions or awakenings:    4-5 times per night (no issue going back to sleep)    Wakeup time:      6 am   Perceived sleep quality:  rested       Daytime naps:      none  Weekend sleep routine:      same  Caffeine use: 5 cups of coffee   exercise habit:   weight and aerobic      PAST MEDICAL HISTORY:    Active Ambulatory Problems     Diagnosis Date Noted    Nontraumatic rupture of Achilles tendon 04/09/2015    Myopia of both eyes 08/31/2017    Labral tear of shoulder, degenerative, right 11/13/2017    Non-traumatic rotator cuff tear, left 05/09/2023    Chronic left shoulder pain 05/23/2023    TYLOR (obstructive sleep apnea) 07/01/2024    Nasal congestion 07/01/2024     Resolved Ambulatory Problems     Diagnosis Date Noted    No Resolved Ambulatory Problems     Past Medical History:   Diagnosis Date    PONV (postoperative nausea and vomiting)                 PAST SURGICAL HISTORY:    Past Surgical History:   Procedure Laterality Date    ACHILLES TENDON SURGERY  2015    ADENOIDECTOMY       ARTHROSCOPIC REPAIR OF ROTATOR CUFF OF SHOULDER Left 5/9/2023    Procedure: REPAIR/PATCH, ROTATOR CUFF, ARTHROSCOPIC;  Surgeon: Tori Davis MD;  Location: [facility] OR;  Service: Orthopedics;  Laterality: Left;    ARTHROSCOPY,SHOULDER,WITH BICEPS TENODESIS Left 5/9/2023    Procedure: ARTHROSCOPY,SHOULDER,WITH BICEPS TENODESIS;  Surgeon: Tori Davis MD;  Location: [facility] OR;  Service: Orthopedics;  Laterality: Left;    DECOMPRESSION OF SUBACROMIAL SPACE Left 5/9/2023    Procedure: DECOMPRESSION, SUBACROMIAL SPACE;  Surgeon: Tori Davis MD;  Location: [facility] OR;  Service: Orthopedics;  Laterality: Left;    SHOULDER ARTHROSCOPY      SINUS SURGERY      x2    WISDOM TOOTH EXTRACTION  [id number]= while in High School.         FAMILY HISTORY:                Family History   Problem Relation Name Age of Onset    No Known Problems Father      Hyperlipidemia Mother      Hypertension Mother      Cataracts Mother      No Known Problems Maternal Aunt      No Known Problems Maternal Uncle      No Known Problems Paternal Aunt      No Known Problems Paternal Uncle      No Known Problems Paternal Grandmother      No Known Problems Paternal Grandfather      No Known Problems Maternal Grandmother      No Known Problems Maternal Grandfather      No Known Problems Sister      Hyperlipidemia Brother      Hypertension Brother      Amblyopia Neg Hx      Blindness Neg Hx      Cancer Neg Hx      Diabetes Neg Hx      Glaucoma Neg Hx      Macular degeneration Neg Hx      Retinal detachment Neg Hx      Strabismus Neg Hx      Stroke Neg Hx      Thyroid disease Neg Hx         SOCIAL HISTORY:          Tobacco:   Social History     Tobacco Use   Smoking Status Never   Smokeless Tobacco Never       alcohol use:    Social History     Substance and Sexual Activity   Alcohol Use Yes    Alcohol/week: 5.0 standard drinks of alcohol    Types: 6 Standard drinks or equivalent per week    Comment: occ.                 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    ALLERGIES:  Review of patient's allergies indicates:  No Known Allergies    CURRENT MEDICATIONS:    Current Outpatient Medications   Medication Sig Dispense Refill    dutasteride (AVODART) 0.5 mg capsule       oxyCODONE-acetaminophen (PERCOCET)  mg per tablet Take 1 tablet by mouth every 4-6 hours as needed for pain. Take stool softener with this medication. 10 tablet 0    promethazine (PHENERGAN) 25 MG tablet Take 1 tablet (25 mg total) by mouth every 6 (six) hours as needed for Nausea. 21 tablet 0    traMADoL (ULTRAM) 50 mg tablet Take 1-2 tablets ( mg total) by mouth every 6 (six) hours as needed for Pain. 21 tablet 0    aspirin (ECOTRIN) 81 MG EC tablet Take 1 tablet (81 mg total) by mouth once daily. For 4 weeks starting the day after surgery. (Patient not taking: Reported on 10/23/2023) 28 tablet 0    celecoxib (CELEBREX) 200 MG capsule Take 1 capsule (200 mg total) by mouth 2 (two) times daily. (Patient not taking: Reported on 10/23/2023) 30 capsule 0    finasteride (PROSCAR) 5 mg tablet Take 5 mg by mouth.       No current facility-administered medications for this visit.                  REVIEW OF SYSTEMS:     Sleep related symptoms as per HPI.  CONST:Denies weight gain    HEENT: Denies sinus congestion;  s/p turbinate resection and deviated septum repair  PULM: Denies dyspnea  CARD:  Denies palpitations   GI:  rare acid reflux  : Denies polyuria  NEURO: Denies headaches  PSYCH: Denies mood disturbance  HEME: Denies anemia   Otherwise, a balance of systems reviewed is negative.          PHYSICAL EXAM:  Vitals:    07/01/24 1014   BP: 113/75   Pulse: (!) 59   SpO2: 97%   Weight: 73.8 kg (162 lb 9.4 oz)   Height: 5' 6" (1.676 m)   PainSc: 0-No pain     Body mass index is 26.24 kg/m².     GENERAL: Normal development, well groomed  HEENT:  Conjunctivae are non-erythematous; Pupils equal, round, and reactive to light; Nose is symmetrical; Nasal mucosa is pink and moist; Septum is " midline; Inferior turbinates are normal; Nasal airflow is normal; Posterior pharynx is pink; Modified Mallampati: 4; Posterior palate is normal; Tonsils +2; Uvula is normal and pink;Tongue is normal; Dentition is fair; No TMJ tenderness; Jaw opening and protrusion without click and without discomfort.  NECK: Supple. Neck circumference is 15 inches. No thyromegaly. No palpable nodes.     SKIN: On face and neck: No abrasions, no rashes, no lesions.  No subcutaneous nodules are palpable.  RESPIRATORY: Chest is clear to auscultation.  Normal chest expansion and non-labored breathing at rest.  CARDIOVASCULAR: Normal S1, S2.  No murmurs, gallops or rubs. No carotid bruits bilaterally.  EXTREMITIES: No edema. No clubbing. No cyanosis. Station normal. Gait normal.        NEURO/PSYCH: Oriented to time, place and person. Normal attention span and concentration. Affect is full. Mood is normal.                                              DATA no prior sleep study    Lab Results   Component Value Date    TSH 2.154 10/20/2016       ASSESSMENT/PLAN    Problem List Items Addressed This Visit       Nasal congestion    Overview     s/p turbinate resection and deviated septum repair.    prn pseudophed.           SUKI (obstructive sleep apnea) - Primary    Overview     The patient symptomatically has loud snoring, witnessed apnea with findings of high grade mallampatti. This warrants further investigation for possible obstructive sleep apnea.  Patient will be contacted after sleep study is done.          Relevant Orders    Home Sleep Study       Diagnostic: Polysomnogram. The nature of this procedure and its indication was discussed with the patient.     Education: During our discussion today, we talked about the etiology of obstructive sleep apnea as well as the potential ramifications of untreated sleep apnea, which could include daytime sleepiness, hypertension, heart disease and/or stroke.     Precautions: The patient was advised  to abstain from driving should they feel sleepy or drowsy.       Patient will No follow-ups on file.      30 minutes of total time spent on the encounter, which includes face to face time and non-face to face time preparing to see the patient (eg, review of tests), Obtaining and/or reviewing separately obtained history, documenting clinical information in the electronic or other health record, independently interpreting results (not separately reported) and communicating results to the patient/family/caregiver, or Care coordination (not separately reported).

## 2024-07-03 ENCOUNTER — TELEPHONE (OUTPATIENT)
Dept: SLEEP MEDICINE | Facility: HOSPITAL | Age: 44
End: 2024-07-03
Payer: COMMERCIAL

## 2024-07-12 ENCOUNTER — PATIENT MESSAGE (OUTPATIENT)
Dept: PULMONOLOGY | Facility: CLINIC | Age: 44
End: 2024-07-12
Payer: COMMERCIAL

## 2025-04-30 ENCOUNTER — CLINICAL SUPPORT (OUTPATIENT)
Dept: OTHER | Facility: CLINIC | Age: 45
End: 2025-04-30

## 2025-04-30 DIAGNOSIS — Z00.8 ENCOUNTER FOR OTHER GENERAL EXAMINATION: ICD-10-CM

## 2025-05-01 VITALS
DIASTOLIC BLOOD PRESSURE: 64 MMHG | HEIGHT: 66 IN | BODY MASS INDEX: 25.71 KG/M2 | WEIGHT: 160 LBS | SYSTOLIC BLOOD PRESSURE: 100 MMHG

## 2025-05-01 LAB
GLUCOSE SERPL-MCNC: 85 MG/DL (ref 60–140)
HDLC SERPL-MCNC: 47 MG/DL
POC CHOLESTEROL, LDL (DOCK): 167 MG/DL
POC CHOLESTEROL, TOTAL: 229 MG/DL
TRIGL SERPL-MCNC: 87 MG/DL

## 2025-05-07 ENCOUNTER — RESULTS FOLLOW-UP (OUTPATIENT)
Dept: OTHER | Facility: CLINIC | Age: 45
End: 2025-05-07

## (undated) DEVICE — BNDG COFLEX FOAM LF2 ST 4X5YD

## (undated) DEVICE — ELECTRODE REM PLYHSV RETURN 9

## (undated) DEVICE — DRESSING XEROFORM NONADH 1X8IN

## (undated) DEVICE — SOL NACL IRR 3000ML

## (undated) DEVICE — NDL SPINAL 18GX3.5 SPINOCAN

## (undated) DEVICE — GLOVE ORTHO PF SZ 8.5

## (undated) DEVICE — PAD ELECTRODE STER 1.5X3

## (undated) DEVICE — SUT 1 36IN PDS II VIO MONO

## (undated) DEVICE — SUT GENESYS PRESSFT 2.6

## (undated) DEVICE — Device

## (undated) DEVICE — GAUZE SPONGE 4X4 12PLY

## (undated) DEVICE — GLOVE BIOGEL ORTHOPEDIC 8

## (undated) DEVICE — SYR IRRIGATION BULB STER 60ML

## (undated) DEVICE — GLOVE SURGICAL LATEX SZ 6.5

## (undated) DEVICE — TAPE SURG MEDIPORE 6X72IN

## (undated) DEVICE — HOOK SPECTRUM 45DEG LEFT

## (undated) DEVICE — BLADE SURG CARBON STEEL SZ11

## (undated) DEVICE — NDL 18GA X1 1/2 REG BEVEL

## (undated) DEVICE — DRAPE STERI-DRAPE 1000 17X11IN

## (undated) DEVICE — APPLICATOR CHLORAPREP ORN 26ML

## (undated) DEVICE — KIT TRACTION SHLDR ST DISP LF

## (undated) DEVICE — TUBING SUC UNIV W/CONN 12FT

## (undated) DEVICE — STRIP MEDI WND CLSR 1/2X4IN

## (undated) DEVICE — CANISTER SUCTION 2 LTR

## (undated) DEVICE — PAD ABD 8X10 STERILE

## (undated) DEVICE — SHAVER ULTRAFFR 4.2MM

## (undated) DEVICE — CONNECTOR TUBING STR 5 IN 1

## (undated) DEVICE — SYR 10CC LUER LOCK

## (undated) DEVICE — SOL NACL IRR 1000ML BTL

## (undated) DEVICE — CUTTER SUT KNOT PUSH PRTL SKID

## (undated) DEVICE — NDL SCORPIAN SUTURE PASSER

## (undated) DEVICE — BNDG COFLEX FOAM LF2 ST 6X5YD

## (undated) DEVICE — SUT SUTURETAPE TIGERLINK 1.3MM

## (undated) DEVICE — PAD DERMAPROX THCK 11X15X1IN

## (undated) DEVICE — GRAFT SPREADER

## (undated) DEVICE — SUT MCRYL PLUS 4-0 PS2 27IN

## (undated) DEVICE — UNDERGLOVES BIOGEL PI SIZE 7.5

## (undated) DEVICE — BLANKET LOWER BODY 55.9X40.2IN

## (undated) DEVICE — DRAPE STERI INSTRUMENT 1018

## (undated) DEVICE — CANNULA ARTHRO 7MMX7CM

## (undated) DEVICE — PAD SHOULDER CARE POLAR

## (undated) DEVICE — PERCUTANEOUS INSERTION KIT, NANO ARTHROSCOPY

## (undated) DEVICE — GLOVE SURGICAL LATEX SZ 8

## (undated) DEVICE — SUT VICRYL PLUS 0 CT1 18IN

## (undated) DEVICE — CHLORAPREP W TINT 26ML APPL

## (undated) DEVICE — WRAP SHLDR HIP ACCU THRM PACK

## (undated) DEVICE — SPONGE LAP 18X18 PREWASHED

## (undated) DEVICE — PAD ABDOMINAL STERILE 8X10IN

## (undated) DEVICE — KIT SHOULDER POSITIONER SPIDER

## (undated) DEVICE — SEE MEDLINE ITEM 157166

## (undated) DEVICE — TUBE SET INFLOW/OUTFLOW

## (undated) DEVICE — SUT MONOCYRL 4-0 PS2 UND

## (undated) DEVICE — SEE MEDLINE ITEM 157117

## (undated) DEVICE — BLADE SHAVER 4.5 6/BX

## (undated) DEVICE — SEE MEDLINE ITEM 152529

## (undated) DEVICE — NDL SPINAL 20GX3.5 HUB

## (undated) DEVICE — CANNULA ARTHRO NON THREAD

## (undated) DEVICE — SYR ONLY LUER LOCK 20CC

## (undated) DEVICE — GLOVE SURGEON SYN PF SZ 9

## (undated) DEVICE — SEE MEDLINE ITEM 146345

## (undated) DEVICE — SUT ETHILON 4-0 BLK MONO

## (undated) DEVICE — UNDERGLOVE BIOGEL PI SZ 6.5 LF

## (undated) DEVICE — DRAPE INCISE IOBAN 2 23X17IN

## (undated) DEVICE — SYR B-D DISP CONTROL 10CC100/C

## (undated) DEVICE — SUT VICRYL PLUS 3-0 SH 18IN

## (undated) DEVICE — TAPE SUTU FIBERLNK WH/BL 0.9MM

## (undated) DEVICE — ABLATOR EXTENDED LENGTH

## (undated) DEVICE — BLADE SHAVER 4.2 6/BX

## (undated) DEVICE — SUPPORT SLING SHOT II MEDIUM

## (undated) DEVICE — MAT SUCTION PUDDLEVAC ORANGE

## (undated) DEVICE — DRAPE STERI U-SHAPED 47X51IN

## (undated) DEVICE — YANKAUER OPEN TIP W/O VENT

## (undated) DEVICE — SYS IMP SWVLOK 3.9 BC
Type: IMPLANTABLE DEVICE | Site: SHOULDER | Status: NON-FUNCTIONAL
Removed: 2023-05-09

## (undated) DEVICE — CANNULA ARTHRO TWST 6.00MMX7CM

## (undated) DEVICE — POSITIONER IV ARMBOARD FOAM

## (undated) DEVICE — TOWEL OR DISP STRL BLUE 4/PK

## (undated) DEVICE — PROBE ARTHO ENERGY 90 DEG

## (undated) DEVICE — GOWN ECLIPSE REINF LVL4 TWL XL

## (undated) DEVICE — GLOVE BIOGEL SKINSENSE PI 7.0

## (undated) DEVICE — GOWN SMART IMP BREATHABLE XXLG

## (undated) DEVICE — PACK ARTHROSCOPY W/ISO BAC

## (undated) DEVICE — SOL IRR NACL .9% 3000ML

## (undated) DEVICE — SUT FIBERWIRE 0 38 W/NDL

## (undated) DEVICE — ADHESIVE MASTISOL VIAL 48/BX

## (undated) DEVICE — CANNULA ARTHSCP BNNT 10MMX50CM